# Patient Record
Sex: FEMALE | Race: ASIAN | NOT HISPANIC OR LATINO | URBAN - METROPOLITAN AREA
[De-identification: names, ages, dates, MRNs, and addresses within clinical notes are randomized per-mention and may not be internally consistent; named-entity substitution may affect disease eponyms.]

---

## 2023-02-12 ENCOUNTER — EMERGENCY (EMERGENCY)
Facility: HOSPITAL | Age: 38
LOS: 1 days | Discharge: HOME | End: 2023-02-12
Attending: EMERGENCY MEDICINE | Admitting: EMERGENCY MEDICINE
Payer: COMMERCIAL

## 2023-02-12 VITALS
SYSTOLIC BLOOD PRESSURE: 125 MMHG | HEART RATE: 91 BPM | DIASTOLIC BLOOD PRESSURE: 86 MMHG | RESPIRATION RATE: 15 BRPM | OXYGEN SATURATION: 100 % | TEMPERATURE: 98 F

## 2023-02-12 PROCEDURE — 99284 EMERGENCY DEPT VISIT MOD MDM: CPT

## 2023-02-12 NOTE — ED ADULT TRIAGE NOTE - CHIEF COMPLAINT QUOTE
presents C/O depression after argument with . non compliant with Abilify.  denies Suicidal or homicidal ideation. denies auditory or visual hallucination. denies drugs or alcohol. Pmhx depression

## 2023-02-12 NOTE — ED PROVIDER NOTE - PATIENT PORTAL LINK FT
You can access the FollowMyHealth Patient Portal offered by [facility] by registering at the following website: [email or web address]. By joining [employer]’s FollowMyHealth portal, you will also be able to view your health information using other applications (apps) compatible with our system.
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
## 2023-02-12 NOTE — ED ADULT NURSE NOTE - OBJECTIVE STATEMENT
Patient A&Ox4 ambulatory c/o depression requesting therapy. Patient states she has been under a lot of stress for the past year and has been unable to see therapist when in need. States hx of depression, noncompliant with medications. Respirations even and unlabored. Patient denies SI/HI/AH at this time. Also denies cp, sob, n/v/d, urinary symptoms, fevers and chills. Patient discharged by attending Darya.
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
## 2023-02-12 NOTE — ED PROVIDER NOTE - NSFOLLOWUPCLINICS_GEN_ALL_ED_FT
[facility] Psychiatry  Psychiatry  [address] [place] [address]  Phone: [phone]  Fax:   Scheduled Appointment: 2/12/2023

## 2023-02-12 NOTE — ED PROVIDER NOTE - PROGRESS NOTE DETAILS
patient denying si/hi. would like to follow up with crisis center in the am. information and return precautions given, patient now denying si/hi. would like to follow up with crisis center in the am. information and return precautions given,

## 2023-08-04 ENCOUNTER — EMERGENCY (EMERGENCY)
Facility: HOSPITAL | Age: 38
LOS: 1 days | Discharge: HOME | End: 2023-08-04
Payer: COMMERCIAL

## 2023-08-04 VITALS
SYSTOLIC BLOOD PRESSURE: 112 MMHG | HEART RATE: 99 BPM | RESPIRATION RATE: 16 BRPM | TEMPERATURE: 98 F | DIASTOLIC BLOOD PRESSURE: 79 MMHG | OXYGEN SATURATION: 100 %

## 2023-08-04 PROCEDURE — 99285 EMERGENCY DEPT VISIT HI MDM: CPT | Mod: 25

## 2023-08-04 PROCEDURE — 99212 OFFICE O/P EST SF 10 MIN: CPT

## 2023-08-04 NOTE — ED ADULT TRIAGE NOTE - TEMPERATURE IN FAHRENHEIT (DEGREES F)
Routing refill request to provider for review/approval because:        Blood pressure under 140/90 in past 12 months        BP Readings from Last 3 Encounters:   11/04/21 (!) 162/98   05/10/21 (!) 152/82   01/21/20 134/76     Isela Serrano RN, BSN   [facility] [place]      97.7

## 2023-08-05 DIAGNOSIS — F25.9 SCHIZOAFFECTIVE DISORDER, UNSPECIFIED: ICD-10-CM

## 2023-08-05 LAB
AMPHET UR-MCNC: NEGATIVE — SIGNIFICANT CHANGE UP
ANION GAP SERPL CALC-SCNC: 14 MMOL/L — SIGNIFICANT CHANGE UP (ref 5–17)
APAP SERPL-MCNC: <15 UG/ML — SIGNIFICANT CHANGE UP (ref 10–30)
APPEARANCE UR: CLEAR — SIGNIFICANT CHANGE UP
BARBITURATES UR SCN-MCNC: NEGATIVE — SIGNIFICANT CHANGE UP
BASOPHILS # BLD AUTO: 0.03 K/UL — SIGNIFICANT CHANGE UP (ref 0–0.2)
BASOPHILS NFR BLD AUTO: 0.4 % — SIGNIFICANT CHANGE UP (ref 0–2)
BENZODIAZ UR-MCNC: NEGATIVE — SIGNIFICANT CHANGE UP
BILIRUB UR-MCNC: NEGATIVE — SIGNIFICANT CHANGE UP
BUN SERPL-MCNC: <4 MG/DL — LOW (ref 7–23)
CALCIUM SERPL-MCNC: 9.7 MG/DL — SIGNIFICANT CHANGE UP (ref 8.4–10.5)
CHLORIDE SERPL-SCNC: 104 MMOL/L — SIGNIFICANT CHANGE UP (ref 96–108)
CO2 SERPL-SCNC: 21 MMOL/L — LOW (ref 22–31)
COCAINE METAB.OTHER UR-MCNC: NEGATIVE — SIGNIFICANT CHANGE UP
COLOR SPEC: YELLOW — SIGNIFICANT CHANGE UP
CREAT SERPL-MCNC: 0.66 MG/DL — SIGNIFICANT CHANGE UP (ref 0.5–1.3)
DIFF PNL FLD: NEGATIVE — SIGNIFICANT CHANGE UP
EGFR: 114 ML/MIN/1.73M2 — SIGNIFICANT CHANGE UP
EOSINOPHIL # BLD AUTO: 0.13 K/UL — SIGNIFICANT CHANGE UP (ref 0–0.5)
EOSINOPHIL NFR BLD AUTO: 1.8 % — SIGNIFICANT CHANGE UP (ref 0–6)
ETHANOL SERPL-MCNC: <10 MG/DL — SIGNIFICANT CHANGE UP (ref 0–10)
FLUAV AG NPH QL: SIGNIFICANT CHANGE UP
FLUBV AG NPH QL: SIGNIFICANT CHANGE UP
GLUCOSE SERPL-MCNC: 98 MG/DL — SIGNIFICANT CHANGE UP (ref 70–99)
GLUCOSE UR QL: NEGATIVE — SIGNIFICANT CHANGE UP
HCG SERPL-ACNC: <2 MIU/ML — SIGNIFICANT CHANGE UP
HCT VFR BLD CALC: 41.7 % — SIGNIFICANT CHANGE UP (ref 34.5–45)
HGB BLD-MCNC: 13.3 G/DL — SIGNIFICANT CHANGE UP (ref 11.5–15.5)
IMM GRANULOCYTES NFR BLD AUTO: 0.4 % — SIGNIFICANT CHANGE UP (ref 0–0.9)
KETONES UR-MCNC: NEGATIVE — SIGNIFICANT CHANGE UP
LEUKOCYTE ESTERASE UR-ACNC: NEGATIVE — SIGNIFICANT CHANGE UP
LYMPHOCYTES # BLD AUTO: 1.41 K/UL — SIGNIFICANT CHANGE UP (ref 1–3.3)
LYMPHOCYTES # BLD AUTO: 19.1 % — SIGNIFICANT CHANGE UP (ref 13–44)
MCHC RBC-ENTMCNC: 30.4 PG — SIGNIFICANT CHANGE UP (ref 27–34)
MCHC RBC-ENTMCNC: 31.9 GM/DL — LOW (ref 32–36)
MCV RBC AUTO: 95.4 FL — SIGNIFICANT CHANGE UP (ref 80–100)
METHADONE UR-MCNC: NEGATIVE — SIGNIFICANT CHANGE UP
MONOCYTES # BLD AUTO: 0.57 K/UL — SIGNIFICANT CHANGE UP (ref 0–0.9)
MONOCYTES NFR BLD AUTO: 7.7 % — SIGNIFICANT CHANGE UP (ref 2–14)
NEUTROPHILS # BLD AUTO: 5.21 K/UL — SIGNIFICANT CHANGE UP (ref 1.8–7.4)
NEUTROPHILS NFR BLD AUTO: 70.6 % — SIGNIFICANT CHANGE UP (ref 43–77)
NITRITE UR-MCNC: NEGATIVE — SIGNIFICANT CHANGE UP
NRBC # BLD: 0 /100 WBCS — SIGNIFICANT CHANGE UP (ref 0–0)
OPIATES UR-MCNC: NEGATIVE — SIGNIFICANT CHANGE UP
OXYCODONE UR-MCNC: NEGATIVE — SIGNIFICANT CHANGE UP
PCP SPEC-MCNC: SIGNIFICANT CHANGE UP
PCP UR-MCNC: NEGATIVE — SIGNIFICANT CHANGE UP
PH UR: 5.5 — SIGNIFICANT CHANGE UP (ref 5–8)
PLATELET # BLD AUTO: 251 K/UL — SIGNIFICANT CHANGE UP (ref 150–400)
POTASSIUM SERPL-MCNC: 3.6 MMOL/L — SIGNIFICANT CHANGE UP (ref 3.5–5.3)
POTASSIUM SERPL-SCNC: 3.6 MMOL/L — SIGNIFICANT CHANGE UP (ref 3.5–5.3)
PROT UR-MCNC: NEGATIVE — SIGNIFICANT CHANGE UP
RBC # BLD: 4.37 M/UL — SIGNIFICANT CHANGE UP (ref 3.8–5.2)
RBC # FLD: 13.1 % — SIGNIFICANT CHANGE UP (ref 10.3–14.5)
RSV RNA NPH QL NAA+NON-PROBE: SIGNIFICANT CHANGE UP
SALICYLATES SERPL-MCNC: <2 MG/DL — LOW (ref 15–30)
SARS-COV-2 RNA SPEC QL NAA+PROBE: SIGNIFICANT CHANGE UP
SODIUM SERPL-SCNC: 139 MMOL/L — SIGNIFICANT CHANGE UP (ref 135–145)
SP GR SPEC: 1.01 — LOW (ref 1.01–1.02)
THC UR QL: NEGATIVE — SIGNIFICANT CHANGE UP
TSH SERPL-MCNC: 0.82 UIU/ML — SIGNIFICANT CHANGE UP (ref 0.27–4.2)
UROBILINOGEN FLD QL: NEGATIVE — SIGNIFICANT CHANGE UP
WBC # BLD: 7.38 K/UL — SIGNIFICANT CHANGE UP (ref 3.8–10.5)
WBC # FLD AUTO: 7.38 K/UL — SIGNIFICANT CHANGE UP (ref 3.8–10.5)

## 2023-08-05 PROCEDURE — 90792 PSYCH DIAG EVAL W/MED SRVCS: CPT | Mod: 95

## 2023-08-05 RX ORDER — OLANZAPINE 15 MG/1
5 TABLET, FILM COATED ORAL ONCE
Refills: 0 | Status: COMPLETED | OUTPATIENT
Start: 2023-08-05 | End: 2023-08-05

## 2023-08-05 RX ORDER — OLANZAPINE 15 MG/1
5 TABLET, FILM COATED ORAL ONCE
Refills: 0 | Status: COMPLETED | OUTPATIENT
Start: 2023-08-05 | End: 2023-08-06

## 2023-08-05 NOTE — ED BEHAVIORAL HEALTH ASSESSMENT NOTE - VIOLENCE RISK FACTORS:
Feeling of being under threat and being unable to control threat/Lack of insight into violence risk/need for treatment/Noncompliance with treatment/Irritability

## 2023-08-05 NOTE — ED BEHAVIORAL HEALTH ASSESSMENT NOTE - PSYCHIATRIC ISSUES AND PLAN (INCLUDE STANDING AND PRN MEDICATION)
would offer zyprexa 5mg PO now and start 5mg qHS. for agitation, haldol 5mg with ativan 2mg and benadryl 50mg PO or IM if severe

## 2023-08-05 NOTE — ED BEHAVIORAL HEALTH ASSESSMENT NOTE - RISK ASSESSMENT
risk factors include medication nonadherence, psychosis, insomnia, aggression. protective factors include lack of prior attempts, lack of access to firearms, sobriety
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
## 2023-08-05 NOTE — ED PROVIDER NOTE - PHYSICAL EXAMINATION
General: WN/WD NAD  Head: Atraumatic  Eyes: EOM grossly in tact, no scleral icterus  ENT: moist mucous membranes  Neurology: A&Ox3, nonfocal, ROME x 4  Respiratory: normal respiratory effort  CV: Extremities warm and well perfused  Abdominal: Soft, non-distended  Extremities: No edema  Skin: No rashes  Psych: Guarded, pressured speech, aggressive but able to verbally redirect/deescalate
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
## 2023-08-05 NOTE — ED ADULT NURSE REASSESSMENT NOTE - NS ED NURSE REASSESS COMMENT FT1
Received patient from JEFF Recio at 1900. Full code, AOx4, continent x2, independent. pt to be admitted to psych facility - 2 pc'd. on 1:1 for risk of harm to self/others. No apparent signs of acute discomfort/distress at this time. Infection/fall/safety protocol maintained. POC reviewed, Pt informed of call bell system and placed within reach.
Received report from JEFF Mcghee . pt Breathing spontaneous and unlabored on, Skin warm and dry and of color appropriate for ethnicity , moves all extremities, speech clear. pending dispo. offered pt breakfast pt stated she was not hungry. no further nurse intervention needed at this time.

## 2023-08-05 NOTE — ED BEHAVIORAL HEALTH NOTE - BEHAVIORAL HEALTH NOTE
==================  PRE-HOSPITAL COURSE  ===================  SOURCE:  MD Sebastian and secondhand ED documentation  DETAILS: BIB EMS/PD for agitation   =========  ED COURSE  =========  SOURCE:  MD and secondhand ED documentation.  ARRIVAL:  Per chart and MD, patient arrived via EMS. Per MD, patient was calm upon arrival, and cooperative with triage process.  BELONGINGS:  Per chart, patient arrived with belongings. All belongings were provided to hospital security, and patient currently in a gown with a 1:1 staff member.  BEHAVIOR: MD described patient to be calm but guarded, poor historian, uncooperative with interview, with pressured and rapid speech presenting with linear thought process, AAOx3, presenting with irritable mood and guarded affect, remains in behavioral and impulse control, is not violent/aggressive. MD stated that the patient is not endorsing SI/HI/A/VH. MD stated that there are no visible marks, bruises, or lacerations on the body. MD stated that the patient appears to be well-groomed, maintains good hygiene.  TREATMENT:  Per chart and MD, patient did not receive PRN medications.   VISITORS:  Per MD, no visitors at bedside.          COVID Exposure Screen- collateral (i.e. third-party, chart review, belongings, etc; include EMS and ED staff)   ---------------------------------------------------  1. Has the patient had a COVID-19 test in the last 90 days? Unknown.   2. Has the patient tested positive for COVID-19 antibodies? Unknown.   3. Has the patient received 2 doses of the COVID-19 vaccine? Unknown  4. In the past 10 days, has the patient been around anyone with a positive COVID-19 test?* Unknown.   5. Has the patient been out of [place] within the past 10 days? Unknown

## 2023-08-05 NOTE — ED PROVIDER NOTE - OBJECTIVE STATEMENT
38yoF w/unknown psychiatric history presenting for psychiatric evaluation, noncompliant with psychiatric medications at home. Had verbal altercation with  at home which led to pt presenting to ED tonight. Pt refusing to provide more details. Denies alcohol tobacco or drugs tonight.

## 2023-08-06 VITALS
DIASTOLIC BLOOD PRESSURE: 62 MMHG | OXYGEN SATURATION: 100 % | HEART RATE: 51 BPM | SYSTOLIC BLOOD PRESSURE: 97 MMHG | TEMPERATURE: 99 F

## 2023-08-06 PROCEDURE — 80048 BASIC METABOLIC PNL TOTAL CA: CPT

## 2023-08-06 PROCEDURE — 99212 OFFICE O/P EST SF 10 MIN: CPT

## 2023-08-06 PROCEDURE — 80307 DRUG TEST PRSMV CHEM ANLYZR: CPT

## 2023-08-06 PROCEDURE — 93005 ELECTROCARDIOGRAM TRACING: CPT

## 2023-08-06 PROCEDURE — 87637 SARSCOV2&INF A&B&RSV AMP PRB: CPT

## 2023-08-06 PROCEDURE — 84443 ASSAY THYROID STIM HORMONE: CPT

## 2023-08-06 PROCEDURE — 84702 CHORIONIC GONADOTROPIN TEST: CPT

## 2023-08-06 PROCEDURE — 99285 EMERGENCY DEPT VISIT HI MDM: CPT | Mod: 25

## 2023-08-06 PROCEDURE — 81003 URINALYSIS AUTO W/O SCOPE: CPT

## 2023-08-06 PROCEDURE — 85025 COMPLETE CBC W/AUTO DIFF WBC: CPT

## 2023-08-06 PROCEDURE — 96372 THER/PROPH/DIAG INJ SC/IM: CPT

## 2023-08-06 RX ORDER — OLANZAPINE 15 MG/1
10 TABLET, FILM COATED ORAL ONCE
Refills: 0 | Status: COMPLETED | OUTPATIENT
Start: 2023-08-06 | End: 2023-08-06

## 2023-08-06 RX ADMIN — OLANZAPINE 10 MILLIGRAM(S): 15 TABLET, FILM COATED ORAL at 10:01

## 2023-08-06 NOTE — PROGRESS NOTE BEHAVIORAL HEALTH - ORIENTED TO PLACE
Yes
Yes
You can access the FollowMyHealth Patient Portal offered by [facility] by registering at the following website: [email or web address]. By joining [employer]’s FollowMyHealth portal, you will also be able to view your health information using other applications (apps) compatible with our system.

## 2023-08-06 NOTE — ED BEHAVIORAL HEALTH NOTE - BEHAVIORAL HEALTH NOTE
Per bedside RN patient has been sleeping in the ED, no acute changes in mood/behavior or other issues identified. Patient continues to hold for bed placement.

## 2023-08-06 NOTE — PROGRESS NOTE BEHAVIORAL HEALTH - NSBHCHARTREVIEWINVESTIGATE_PSY_A_CORE FT
< from: 12 Lead ECG (08.05.23 @ 01:00) >      Ventricular Rate 60 BPM    Atrial Rate 60 BPM    P-R Interval 156 ms    QRS Duration 78 ms    Q-T Interval 382 ms    QTC Calculation(Bazett) 382 ms      < end of copied text >
< from: 12 Lead ECG (08.05.23 @ 01:00) >      Ventricular Rate 60 BPM    Atrial Rate 60 BPM    P-R Interval 156 ms    QRS Duration 78 ms    Q-T Interval 382 ms    QTC Calculation(Bazett) 382 ms      < end of copied text >

## 2023-08-06 NOTE — PROGRESS NOTE BEHAVIORAL HEALTH - NSBHFUPINTERVALHXFT_PSY_A_CORE
Patient seen and evaluated, staff consulted, chart, labs, meds reviewed. Patient irritable, trying to explain her situation leading to this ED visit, evasive, tangential, at times non-sensical. She is intermittently agitated, but mostly responds to redirection. Marginally cooperative, prefers to be left alone.
Patient seen and evaluated, staff consulted, chart, labs, meds reviewed. Patient intermittently agitated, but mostly responds to redirection. Marginally cooperative, prefers to be left alone.

## 2023-08-06 NOTE — PROGRESS NOTE BEHAVIORAL HEALTH - NSBHCHARTREVIEWLAB_PSY_A_CORE FT
13.3   7.38  )-----------( 251      ( 05 Aug 2023 01:47 )             41.7     08-    139  |  104  |  <4<L>  ----------------------------<  98  3.6   |  21<L>  |  0.66    Ca    9.7      05 Aug 2023 01:47      Urinalysis Basic - ( 05 Aug 2023 02:40 )    Color: Yellow / Appearance: Clear / S.006 / pH: x  Gluc: x / Ketone: Negative  / Bili: Negative / Urobili: Negative   Blood: x / Protein: Negative / Nitrite: Negative   Leuk Esterase: Negative / RBC: x / WBC x   Sq Epi: x / Non Sq Epi: x / Bacteria: x
13.3   7.38  )-----------( 251      ( 05 Aug 2023 01:47 )             41.7     08-    139  |  104  |  <4<L>  ----------------------------<  98  3.6   |  21<L>  |  0.66    Ca    9.7      05 Aug 2023 01:47      Urinalysis Basic - ( 05 Aug 2023 02:40 )    Color: Yellow / Appearance: Clear / S.006 / pH: x  Gluc: x / Ketone: Negative  / Bili: Negative / Urobili: Negative   Blood: x / Protein: Negative / Nitrite: Negative   Leuk Esterase: Negative / RBC: x / WBC x   Sq Epi: x / Non Sq Epi: x / Bacteria: x
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
## 2023-08-06 NOTE — ED ADULT NURSE REASSESSMENT NOTE - DESCRIPTION
Pt refused to take Zyprexa 5 mg po as stat, said she is fine and the doctor is "crazy"
pt is asleep, reprt given to ARELIS Martinez

## 2023-08-06 NOTE — PROGRESS NOTE BEHAVIORAL HEALTH - NSBHATTESTBILLING_PSY_A_CORE
[id number]-Sjviadlyvgt diagnostic evaluation with medical services
[id number]-Gygcfgjrxis diagnostic evaluation with medical services

## 2023-08-06 NOTE — PROGRESS NOTE BEHAVIORAL HEALTH - NSBHCHARTREVIEWVS_PSY_A_CORE FT
Vital Signs Last 24 Hrs  T(C): 36.5 (06 Aug 2023 11:20), Max: 36.9 (06 Aug 2023 06:24)  T(F): 97.7 (06 Aug 2023 11:20), Max: 98.5 (06 Aug 2023 06:24)  HR: 49 (06 Aug 2023 11:20) (49 - 62)  BP: 96/59 (06 Aug 2023 11:20) (96/59 - 109/69)  BP(mean): 76 (05 Aug 2023 17:28) (76 - 76)  RR: 16 (06 Aug 2023 06:24) (16 - 18)  SpO2: 100% (06 Aug 2023 11:20) (99% - 100%)    Parameters below as of 06 Aug 2023 06:24  Patient On (Oxygen Delivery Method): room air      T(C): 36.5 (08-06-23 @ 11:20), Max: 36.9 (08-06-23 @ 06:24)  HR: 49 (08-06-23 @ 11:20) (49 - 62)  BP: 96/59 (08-06-23 @ 11:20) (96/59 - 109/69)  RR: 16 (08-06-23 @ 06:24) (16 - 18)  SpO2: 100% (08-06-23 @ 11:20) (99% - 100%)  Wt(kg): --
ICU Vital Signs Last 24 Hrs  T(C): 36.7 (05 Aug 2023 17:28), Max: 36.9 (05 Aug 2023 05:12)  T(F): 98 (05 Aug 2023 17:28), Max: 98.4 (05 Aug 2023 05:12)  HR: 56 (05 Aug 2023 17:28) (54 - 99)  BP: 99/64 (05 Aug 2023 17:28) (99/64 - 120/82)  BP(mean): 76 (05 Aug 2023 17:28) (76 - 76)  ABP: --  ABP(mean): --  RR: 18 (05 Aug 2023 17:28) (16 - 18)  SpO2: 99% (05 Aug 2023 17:28) (98% - 100%)    O2 Parameters below as of 05 Aug 2023 17:28  Patient On (Oxygen Delivery Method): room air

## 2023-10-06 ENCOUNTER — EMERGENCY (EMERGENCY)
Facility: HOSPITAL | Age: 38
LOS: 1 days | Discharge: PSYCHIATRIC OR SUBSTANCE USE FACILITY | End: 2023-10-06
Attending: EMERGENCY MEDICINE
Payer: MEDICARE

## 2023-10-06 VITALS
DIASTOLIC BLOOD PRESSURE: 72 MMHG | RESPIRATION RATE: 20 BRPM | TEMPERATURE: 98 F | SYSTOLIC BLOOD PRESSURE: 118 MMHG | OXYGEN SATURATION: 93 % | HEART RATE: 99 BPM

## 2023-10-06 DIAGNOSIS — F31.64 BIPOLAR DISORDER, CURRENT EPISODE MIXED, SEVERE, WITH PSYCHOTIC FEATURES: ICD-10-CM

## 2023-10-06 LAB
ALBUMIN SERPL ELPH-MCNC: 4.4 G/DL — SIGNIFICANT CHANGE UP (ref 3.3–5)
ALP SERPL-CCNC: 64 U/L — SIGNIFICANT CHANGE UP (ref 40–120)
ALT FLD-CCNC: 9 U/L — LOW (ref 10–45)
ANION GAP SERPL CALC-SCNC: 12 MMOL/L — SIGNIFICANT CHANGE UP (ref 5–17)
APAP SERPL-MCNC: <15 UG/ML — SIGNIFICANT CHANGE UP (ref 10–30)
APPEARANCE UR: CLEAR — SIGNIFICANT CHANGE UP
AST SERPL-CCNC: 16 U/L — SIGNIFICANT CHANGE UP (ref 10–40)
BACTERIA # UR AUTO: NEGATIVE — SIGNIFICANT CHANGE UP
BASOPHILS # BLD AUTO: 0.03 K/UL — SIGNIFICANT CHANGE UP (ref 0–0.2)
BASOPHILS NFR BLD AUTO: 0.3 % — SIGNIFICANT CHANGE UP (ref 0–2)
BILIRUB SERPL-MCNC: 0.5 MG/DL — SIGNIFICANT CHANGE UP (ref 0.2–1.2)
BILIRUB UR-MCNC: NEGATIVE — SIGNIFICANT CHANGE UP
BUN SERPL-MCNC: 9 MG/DL — SIGNIFICANT CHANGE UP (ref 7–23)
CALCIUM SERPL-MCNC: 9.3 MG/DL — SIGNIFICANT CHANGE UP (ref 8.4–10.5)
CHLORIDE SERPL-SCNC: 106 MMOL/L — SIGNIFICANT CHANGE UP (ref 96–108)
CO2 SERPL-SCNC: 23 MMOL/L — SIGNIFICANT CHANGE UP (ref 22–31)
COLOR SPEC: YELLOW — SIGNIFICANT CHANGE UP
CREAT SERPL-MCNC: 0.76 MG/DL — SIGNIFICANT CHANGE UP (ref 0.5–1.3)
DIFF PNL FLD: NEGATIVE — SIGNIFICANT CHANGE UP
EGFR: 102 ML/MIN/1.73M2 — SIGNIFICANT CHANGE UP
EOSINOPHIL # BLD AUTO: 0.08 K/UL — SIGNIFICANT CHANGE UP (ref 0–0.5)
EOSINOPHIL NFR BLD AUTO: 0.9 % — SIGNIFICANT CHANGE UP (ref 0–6)
EPI CELLS # UR: 6 /HPF — HIGH
ETHANOL SERPL-MCNC: <10 MG/DL — SIGNIFICANT CHANGE UP (ref 0–10)
FLUAV AG NPH QL: SIGNIFICANT CHANGE UP
FLUBV AG NPH QL: SIGNIFICANT CHANGE UP
GLUCOSE SERPL-MCNC: 86 MG/DL — SIGNIFICANT CHANGE UP (ref 70–99)
GLUCOSE UR QL: NEGATIVE — SIGNIFICANT CHANGE UP
HCG SERPL-ACNC: <2 MIU/ML — SIGNIFICANT CHANGE UP
HCT VFR BLD CALC: 38.3 % — SIGNIFICANT CHANGE UP (ref 34.5–45)
HGB BLD-MCNC: 12.5 G/DL — SIGNIFICANT CHANGE UP (ref 11.5–15.5)
HYALINE CASTS # UR AUTO: 0 /LPF — SIGNIFICANT CHANGE UP (ref 0–7)
IMM GRANULOCYTES NFR BLD AUTO: 0.4 % — SIGNIFICANT CHANGE UP (ref 0–0.9)
KETONES UR-MCNC: NEGATIVE — SIGNIFICANT CHANGE UP
LEUKOCYTE ESTERASE UR-ACNC: NEGATIVE — SIGNIFICANT CHANGE UP
LYMPHOCYTES # BLD AUTO: 0.77 K/UL — LOW (ref 1–3.3)
LYMPHOCYTES # BLD AUTO: 8.7 % — LOW (ref 13–44)
MCHC RBC-ENTMCNC: 30.9 PG — SIGNIFICANT CHANGE UP (ref 27–34)
MCHC RBC-ENTMCNC: 32.6 GM/DL — SIGNIFICANT CHANGE UP (ref 32–36)
MCV RBC AUTO: 94.8 FL — SIGNIFICANT CHANGE UP (ref 80–100)
MONOCYTES # BLD AUTO: 0.51 K/UL — SIGNIFICANT CHANGE UP (ref 0–0.9)
MONOCYTES NFR BLD AUTO: 5.7 % — SIGNIFICANT CHANGE UP (ref 2–14)
NEUTROPHILS # BLD AUTO: 7.46 K/UL — HIGH (ref 1.8–7.4)
NEUTROPHILS NFR BLD AUTO: 84 % — HIGH (ref 43–77)
NITRITE UR-MCNC: NEGATIVE — SIGNIFICANT CHANGE UP
NRBC # BLD: 0 /100 WBCS — SIGNIFICANT CHANGE UP (ref 0–0)
PH UR: 6 — SIGNIFICANT CHANGE UP (ref 5–8)
PLATELET # BLD AUTO: 234 K/UL — SIGNIFICANT CHANGE UP (ref 150–400)
POTASSIUM SERPL-MCNC: 3.3 MMOL/L — LOW (ref 3.5–5.3)
POTASSIUM SERPL-SCNC: 3.3 MMOL/L — LOW (ref 3.5–5.3)
PROT SERPL-MCNC: 6.9 G/DL — SIGNIFICANT CHANGE UP (ref 6–8.3)
PROT UR-MCNC: ABNORMAL
RBC # BLD: 4.04 M/UL — SIGNIFICANT CHANGE UP (ref 3.8–5.2)
RBC # FLD: 12.8 % — SIGNIFICANT CHANGE UP (ref 10.3–14.5)
RBC CASTS # UR COMP ASSIST: 3 /HPF — SIGNIFICANT CHANGE UP (ref 0–4)
RSV RNA NPH QL NAA+NON-PROBE: SIGNIFICANT CHANGE UP
SALICYLATES SERPL-MCNC: <2 MG/DL — LOW (ref 15–30)
SARS-COV-2 RNA SPEC QL NAA+PROBE: SIGNIFICANT CHANGE UP
SODIUM SERPL-SCNC: 141 MMOL/L — SIGNIFICANT CHANGE UP (ref 135–145)
SP GR SPEC: 1.03 — HIGH (ref 1.01–1.02)
UROBILINOGEN FLD QL: ABNORMAL
WBC # BLD: 8.89 K/UL — SIGNIFICANT CHANGE UP (ref 3.8–10.5)
WBC # FLD AUTO: 8.89 K/UL — SIGNIFICANT CHANGE UP (ref 3.8–10.5)
WBC UR QL: 7 /HPF — HIGH (ref 0–5)

## 2023-10-06 PROCEDURE — 85025 COMPLETE CBC W/AUTO DIFF WBC: CPT

## 2023-10-06 PROCEDURE — 99285 EMERGENCY DEPT VISIT HI MDM: CPT

## 2023-10-06 PROCEDURE — 93005 ELECTROCARDIOGRAM TRACING: CPT

## 2023-10-06 PROCEDURE — 80053 COMPREHEN METABOLIC PANEL: CPT

## 2023-10-06 PROCEDURE — 81001 URINALYSIS AUTO W/SCOPE: CPT

## 2023-10-06 PROCEDURE — 99285 EMERGENCY DEPT VISIT HI MDM: CPT | Mod: 25

## 2023-10-06 PROCEDURE — 87637 SARSCOV2&INF A&B&RSV AMP PRB: CPT

## 2023-10-06 PROCEDURE — 84702 CHORIONIC GONADOTROPIN TEST: CPT

## 2023-10-06 PROCEDURE — 99284 EMERGENCY DEPT VISIT MOD MDM: CPT | Mod: GC

## 2023-10-06 PROCEDURE — 80307 DRUG TEST PRSMV CHEM ANLYZR: CPT

## 2023-10-06 RX ORDER — POTASSIUM CHLORIDE 20 MEQ
40 PACKET (EA) ORAL ONCE
Refills: 0 | Status: COMPLETED | OUTPATIENT
Start: 2023-10-06 | End: 2023-10-06

## 2023-10-06 RX ADMIN — Medication 40 MILLIEQUIVALENT(S): at 20:03

## 2023-10-06 NOTE — ED BEHAVIORAL HEALTH ASSESSMENT NOTE - RISK ASSESSMENT
Risk factors: medication nonadherence, insomnia, agitation, aggression, hx of SI in the past, repeat instances of aggression, tangential thoughts.     Protective: Risk factors: medication nonadherence, insomnia, agitation, aggression, hx of SI in the past, repeat instances of aggression, tangential thoughts. Prior to admissions.     Protective: Risk factors: medication nonadherence, insomnia, agitation, aggression, hx of SI in the past, repeat instances of aggression, tangential thoughts. Prior to admissions.
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
## 2023-10-06 NOTE — ED BEHAVIORAL HEALTH ASSESSMENT NOTE - NSBHMSESPEECH_PSY_A_CORE
unable to assess- patient does not want to speak with us about her stay. Normal volume, rate, productivity, spontaneity and articulation

## 2023-10-06 NOTE — ED BEHAVIORAL HEALTH ASSESSMENT NOTE - OTHER PAST PSYCHIATRIC HISTORY (INCLUDE DETAILS REGARDING ONSET, COURSE OF ILLNESS, INPATIENT/OUTPATIENT TREATMENT)
Depression  Schizoaffective disorder   SI     Seen by Behavorial health in the August 2023  [facility] admission for psychiatric
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
## 2023-10-06 NOTE — ED BEHAVIORAL HEALTH ASSESSMENT NOTE - DESCRIPTION
none Placed in [facility] 29 with 1:1 observation. Prelim labs and urine toxicology had been collected. N/a  for 5 years
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
## 2023-10-06 NOTE — ED BEHAVIORAL HEALTH ASSESSMENT NOTE - NSACTIVEVENT_PSY_ALL_CORE
Triggering events leading to humiliation, shame, and/or despair (e.g., Loss of relationship, financial or health status) (real or anticipated)/Current or pending social isolation/Inadequate social supports/Recent onset of psychiatric illness

## 2023-10-06 NOTE — ED BEHAVIORAL HEALTH ASSESSMENT NOTE - NSPRESENTSXS_PSY_ALL_CORE
Depressed mood/Anhedonia/Psychosis/Impulsivity/Severe anxiety, agitation or panic/Refusal or inability to complete safety plan

## 2023-10-06 NOTE — ED BEHAVIORAL HEALTH ASSESSMENT NOTE - DETAILS
Patient had suicidal ideation in the past Patient had passive  suicidal ideation in Feb 2023. Patient had SI in 2008 and 2012. Plan communicated Bed pending
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
## 2023-10-07 ENCOUNTER — INPATIENT (INPATIENT)
Facility: HOSPITAL | Age: 38
LOS: 58 days | Discharge: HOME | End: 2023-12-05
Attending: STUDENT IN AN ORGANIZED HEALTH CARE EDUCATION/TRAINING PROGRAM | Admitting: STUDENT IN AN ORGANIZED HEALTH CARE EDUCATION/TRAINING PROGRAM
Payer: MEDICARE

## 2023-10-07 VITALS
RESPIRATION RATE: 18 BRPM | DIASTOLIC BLOOD PRESSURE: 71 MMHG | TEMPERATURE: 98 F | HEART RATE: 67 BPM | OXYGEN SATURATION: 100 % | SYSTOLIC BLOOD PRESSURE: 104 MMHG

## 2023-10-07 VITALS — OXYGEN SATURATION: 100 % | TEMPERATURE: 98 F | RESPIRATION RATE: 18 BRPM | HEIGHT: 66 IN

## 2023-10-07 DIAGNOSIS — F29 UNSPECIFIED PSYCHOSIS NOT DUE TO A SUBSTANCE OR KNOWN PHYSIOLOGICAL CONDITION: ICD-10-CM

## 2023-10-07 PROCEDURE — 90853 GROUP PSYCHOTHERAPY: CPT

## 2023-10-07 RX ORDER — OLANZAPINE 15 MG/1
5 TABLET, FILM COATED ORAL ONCE
Refills: 0 | Status: DISCONTINUED | OUTPATIENT
Start: 2023-10-07 | End: 2023-12-05

## 2023-10-07 RX ORDER — ACETAMINOPHEN 500 MG
650 TABLET ORAL EVERY 6 HOURS
Refills: 0 | Status: DISCONTINUED | OUTPATIENT
Start: 2023-10-07 | End: 2023-12-05

## 2023-10-07 RX ORDER — OLANZAPINE 15 MG/1
5 TABLET, FILM COATED ORAL EVERY 6 HOURS
Refills: 0 | Status: DISCONTINUED | OUTPATIENT
Start: 2023-10-07 | End: 2023-12-05

## 2023-10-07 NOTE — ED BEHAVIORAL HEALTH PROGRESS NOTE - RISK ASSESSMENT
Risk factors: medication nonadherence, insomnia, agitation, aggression, hx of SI in the past, repeat instances of aggression, tangential thoughts. Prior to admissions.

## 2023-10-07 NOTE — BH PATIENT PROFILE - HOME MEDICATIONS
Tony Grady,    Facilitator: Kelly Cabello, Certified Health     - No Rx/Hx Recorded

## 2023-10-07 NOTE — ED BEHAVIORAL HEALTH PROGRESS NOTE - DETAILS:
Patient hyperverbal and disorganized on exam. Notes her  is "eye genius" and goes on to state that his friend is a psychopath. Continues to derail as assessment progresses with limited insight into ED admission.

## 2023-10-07 NOTE — ED BEHAVIORAL HEALTH NOTE - BEHAVIORAL HEALTH NOTE
Per RN patient remains gowned, wanded, and in private room on 1:1 observation. No SI/HI/AH/VH elicited overnight; RN notes patient requesting to leave, one episode where patient burst into tears however stopped quickly after. Patient observed to be sleeping in hospital bed overnight and ambulating to the rest room on her own a few times. Patient has not required PRN medications in the ED and remains in behavioral control. Patient unaccompanied by social supports at this time. Patient pending psychiatric admission a this time.
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
## 2023-10-07 NOTE — ED BEHAVIORAL HEALTH PROGRESS NOTE - NS ED BHA PLAN HANDOFF TO INPATIENT PROVIDER YESNO
Health Maintenance Due   Topic Date Due   • Osteoporosis Screening  Never done   • COVID-19 Vaccine (2 - Booster for Tommy series) 06/07/2021   • Medicare Wellness Visit  01/19/2022       Patient is due for topics as listed above but is not proceeding with Immunization(s) COVID-19 at this time.     Recent PHQ 2/9 Score    PHQ 2:  Date Adult PHQ 2 Score Adult PHQ 2 Interpretation   8/19/2021 0 No further screening needed       PHQ 9:        No
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
## 2023-10-07 NOTE — ED ADULT NURSE REASSESSMENT NOTE - NS ED NURSE REASSESS COMMENT FT1
pt received at beginning of shift alert and oriented, accepted po potassium chloride and reinforced plan of care. Pt spontaneously started to cry and stopped by self as witnessed by staff. Denies any pain or discomfort. Appears to be resting in GRN 29 for most of the night, intermittently ambulating independently to bathroom accompanied by 1:1. 1:1 maintained for safety, no acute psych incidents overnight.
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
## 2023-10-08 PROCEDURE — 99221 1ST HOSP IP/OBS SF/LOW 40: CPT
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
## 2023-10-08 NOTE — BH INPATIENT PSYCHIATRY ASSESSMENT NOTE - NSBHATTESTAPPBILLTIME_PSY_A_CORE
I attest my time as SAMAN is greater than 50% of the total combined time spent on qualifying patient care activities. I have reviewed and verified the documentation.

## 2023-10-08 NOTE — BH INPATIENT PSYCHIATRY ASSESSMENT NOTE - OTHER PAST PSYCHIATRIC HISTORY (INCLUDE DETAILS REGARDING ONSET, COURSE OF ILLNESS, INPATIENT/OUTPATIENT TREATMENT)
Depression  Schizoaffective disorder   SI     Seen by Behavorial health in the August 2023  [facility] admission for psychiatric
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
## 2023-10-08 NOTE — BH INPATIENT PSYCHIATRY ASSESSMENT NOTE - NSBHCHARTREVIEWVS_PSY_A_CORE FT
Vital Signs Last 24 Hrs  T(C): 36.5 (10-08-23 @ 08:33), Max: 36.9 (10-07-23 @ 17:13)  T(F): 97.7 (10-08-23 @ 08:33), Max: 98.4 (10-07-23 @ 17:13)  HR: --  BP: --  BP(mean): --  RR: 18 (10-07-23 @ 17:13) (18 - 18)  SpO2: 100% (10-07-23 @ 17:13) (100% - 100%)    Orthostatic VS  10-08-23 @ 08:33  Lying BP: --/-- HR: --  Sitting BP: 119/67 HR: 69  Standing BP: 106/65 HR: 100  Site: --  Mode: --  Orthostatic VS  10-07-23 @ 17:13  Lying BP: --/-- HR: --  Sitting BP: 111/80 HR: 73  Standing BP: 112/90 HR: 102  Site: upper right arm  Mode: electronic
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
## 2023-10-08 NOTE — BH INPATIENT PSYCHIATRY ASSESSMENT NOTE - CURRENT MEDICATION
MEDICATIONS  (STANDING):    MEDICATIONS  (PRN):  acetaminophen     Tablet .. 650 milliGRAM(s) Oral every 6 hours PRN Temp greater or equal to 38C (100.4F), Mild Pain (1 - 3)  LORazepam     Tablet 2 milliGRAM(s) Oral every 6 hours PRN agitation  OLANZapine 5 milliGRAM(s) Oral every 6 hours PRN agitation  OLANZapine Injectable 5 milliGRAM(s) IntraMuscular once PRN severe agitation

## 2023-10-08 NOTE — BH INPATIENT PSYCHIATRY ASSESSMENT NOTE - NSBHATTESTBILLING_PSY_A_CORE
99221-Initial OBS or IP - low complexity OR 40-54 mins 99222-Initial OBS or IP - moderate complexity OR 55-74 mins
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
## 2023-10-08 NOTE — BH INPATIENT PSYCHIATRY ASSESSMENT NOTE - NSBHCRANIAL_PSY_ALL_CORE
Recognizes 2 fingers or can read (II)/Smiles, shows teeth, opens mouth, sticks out tongue (V, VII, XI)/Normal speech (IX, X, XII)/Hearing intact (VIII)

## 2023-10-09 PROCEDURE — 99232 SBSQ HOSP IP/OBS MODERATE 35: CPT

## 2023-10-09 RX ORDER — RISPERIDONE 4 MG/1
1 TABLET ORAL AT BEDTIME
Refills: 0 | Status: DISCONTINUED | OUTPATIENT
Start: 2023-10-09 | End: 2023-10-10
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
## 2023-10-09 NOTE — BH INPATIENT PSYCHIATRY PROGRESS NOTE - NSBHCHARTREVIEWVS_PSY_A_CORE FT
Vital Signs Last 24 Hrs  T(C): 36.3 (10-09-23 @ 07:43), Max: 36.8 (10-08-23 @ 19:15)  T(F): 97.4 (10-09-23 @ 07:43), Max: 98.2 (10-08-23 @ 19:15)  HR: --  BP: --  BP(mean): --  RR: --  SpO2: --    Orthostatic VS  10-09-23 @ 07:43  Lying BP: --/-- HR: --  Sitting BP: 110/81 HR: 60  Standing BP: 110/83 HR: 74  Site: --  Mode: --  Orthostatic VS  10-08-23 @ 19:15  Lying BP: --/-- HR: --  Sitting BP: 129/89 HR: 66  Standing BP: 111/71 HR: 96  Site: --  Mode: --  Orthostatic VS  10-08-23 @ 08:33  Lying BP: --/-- HR: --  Sitting BP: 119/67 HR: 69  Standing BP: 106/65 HR: 100  Site: --  Mode: --  Orthostatic VS  10-07-23 @ 17:13  Lying BP: --/-- HR: --  Sitting BP: 111/80 HR: 73  Standing BP: 112/90 HR: 102  Site: upper right arm  Mode: electronic
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
## 2023-10-09 NOTE — BH INPATIENT PSYCHIATRY PROGRESS NOTE - MSE UNSTRUCTURED FT
Appearance: grooming-intact ***  Behavior: cooperative, no PMA/PMR ***  Speech: regular rate, rhythm and volume ***  Mood:   Affect: euthymic ***  Thought process: linear, logical, organized ***  Thought content: no delusions elicited ***  Perceptions: no AH, VH elicited ***  Insight:  Judgement:  Cognition: grossly intact ***  Gait: intact Appearance: grooming-intact  Behavior: cooperative but guarded and suspicious, no PMA/PMR  Speech: hyperverbal and difficult to interrupt, normal volume  Mood: "I feel like crap"  Affect: irritable  Thought process: tangential, disorganized, difficult to follow  Thought content: highly paranoid/suspucious about family/coworkers, likely delusions  Perceptions: no AH, VH elicited, not internally pre-occupied appearing  Insight: extremely poor  Judgement: limited  Cognition: grossly intact   Gait: intact

## 2023-10-09 NOTE — BH INPATIENT PSYCHIATRY PROGRESS NOTE - CURRENT MEDICATION
MEDICATIONS  (STANDING):    MEDICATIONS  (PRN):  acetaminophen     Tablet .. 650 milliGRAM(s) Oral every 6 hours PRN Temp greater or equal to 38C (100.4F), Mild Pain (1 - 3)  LORazepam     Tablet 2 milliGRAM(s) Oral every 6 hours PRN agitation  OLANZapine 5 milliGRAM(s) Oral every 6 hours PRN agitation  OLANZapine Injectable 5 milliGRAM(s) IntraMuscular once PRN severe agitation   MEDICATIONS  (STANDING):  risperiDONE   Tablet 1 milliGRAM(s) Oral at bedtime    MEDICATIONS  (PRN):  acetaminophen     Tablet .. 650 milliGRAM(s) Oral every 6 hours PRN Temp greater or equal to 38C (100.4F), Mild Pain (1 - 3)  LORazepam     Tablet 2 milliGRAM(s) Oral every 6 hours PRN agitation  OLANZapine 5 milliGRAM(s) Oral every 6 hours PRN agitation  OLANZapine Injectable 5 milliGRAM(s) IntraMuscular once PRN severe agitation

## 2023-10-09 NOTE — BH INPATIENT PSYCHIATRY PROGRESS NOTE - PRN MEDS
MEDICATIONS  (PRN):  acetaminophen     Tablet .. 650 milliGRAM(s) Oral every 6 hours PRN Temp greater or equal to 38C (100.4F), Mild Pain (1 - 3)  LORazepam     Tablet 2 milliGRAM(s) Oral every 6 hours PRN agitation  OLANZapine 5 milliGRAM(s) Oral every 6 hours PRN agitation  OLANZapine Injectable 5 milliGRAM(s) IntraMuscular once PRN severe agitation

## 2023-10-10 PROCEDURE — 99232 SBSQ HOSP IP/OBS MODERATE 35: CPT

## 2023-10-10 RX ORDER — ARIPIPRAZOLE 15 MG/1
5 TABLET ORAL AT BEDTIME
Refills: 0 | Status: DISCONTINUED | OUTPATIENT
Start: 2023-10-10 | End: 2023-10-17
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
## 2023-10-10 NOTE — BH INPATIENT PSYCHIATRY PROGRESS NOTE - NSBHCHARTREVIEWVS_PSY_A_CORE FT
Vital Signs Last 24 Hrs  T(C): 36.6 (10-10-23 @ 08:43), Max: 36.8 (10-09-23 @ 19:12)  T(F): 97.9 (10-10-23 @ 08:43), Max: 98.2 (10-09-23 @ 19:12)  HR: --  BP: --  BP(mean): --  RR: --  SpO2: --    Orthostatic VS  10-10-23 @ 08:43  Lying BP: --/-- HR: --  Sitting BP: 97/70 HR: 71  Standing BP: 98/70 HR: 90  Site: --  Mode: --  Orthostatic VS  10-09-23 @ 19:12  Lying BP: --/-- HR: --  Sitting BP: 100/60 HR: 73  Standing BP: 100/70 HR: 83  Site: --  Mode: --  Orthostatic VS  10-09-23 @ 07:43  Lying BP: --/-- HR: --  Sitting BP: 110/81 HR: 60  Standing BP: 110/83 HR: 74  Site: --  Mode: --  Orthostatic VS  10-08-23 @ 19:15  Lying BP: --/-- HR: --  Sitting BP: 129/89 HR: 66  Standing BP: 111/71 HR: 96  Site: --  Mode: --

## 2023-10-10 NOTE — BH INPATIENT PSYCHIATRY PROGRESS NOTE - CURRENT MEDICATION
MEDICATIONS  (STANDING):  risperiDONE   Tablet 1 milliGRAM(s) Oral at bedtime    MEDICATIONS  (PRN):  acetaminophen     Tablet .. 650 milliGRAM(s) Oral every 6 hours PRN Temp greater or equal to 38C (100.4F), Mild Pain (1 - 3)  LORazepam     Tablet 2 milliGRAM(s) Oral every 6 hours PRN agitation  OLANZapine 5 milliGRAM(s) Oral every 6 hours PRN agitation  OLANZapine Injectable 5 milliGRAM(s) IntraMuscular once PRN severe agitation   MEDICATIONS  (STANDING):  ARIPiprazole 5 milliGRAM(s) Oral at bedtime    MEDICATIONS  (PRN):  acetaminophen     Tablet .. 650 milliGRAM(s) Oral every 6 hours PRN Temp greater or equal to 38C (100.4F), Mild Pain (1 - 3)  LORazepam     Tablet 2 milliGRAM(s) Oral every 6 hours PRN agitation  OLANZapine 5 milliGRAM(s) Oral every 6 hours PRN agitation  OLANZapine Injectable 5 milliGRAM(s) IntraMuscular once PRN severe agitation
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
## 2023-10-11 PROCEDURE — 90834 PSYTX W PT 45 MINUTES: CPT

## 2023-10-11 PROCEDURE — 99232 SBSQ HOSP IP/OBS MODERATE 35: CPT
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
## 2023-10-11 NOTE — BH INPATIENT PSYCHIATRY PROGRESS NOTE - NSBHCHARTREVIEWVS_PSY_A_CORE FT
Vital Signs Last 24 Hrs  T(C): 36.4 (10-11-23 @ 06:58), Max: 36.6 (10-10-23 @ 08:43)  T(F): 97.6 (10-11-23 @ 06:58), Max: 97.9 (10-10-23 @ 08:43)  HR: --  BP: --  BP(mean): --  RR: --  SpO2: --    Orthostatic VS  10-11-23 @ 06:58  Lying BP: --/-- HR: --  Sitting BP: 96/63 HR: 81  Standing BP: --/-- HR: --  Site: upper left arm  Mode: electronic  Orthostatic VS  10-10-23 @ 08:43  Lying BP: --/-- HR: --  Sitting BP: 97/70 HR: 71  Standing BP: 98/70 HR: 90  Site: --  Mode: --  Orthostatic VS  10-09-23 @ 19:12  Lying BP: --/-- HR: --  Sitting BP: 100/60 HR: 73  Standing BP: 100/70 HR: 83  Site: --  Mode: --   Vital Signs Last 24 Hrs  T(C): 36.4 (10-11-23 @ 06:58), Max: 36.4 (10-11-23 @ 06:58)  T(F): 97.6 (10-11-23 @ 06:58), Max: 97.6 (10-11-23 @ 06:58)  HR: --  BP: --  BP(mean): --  RR: --  SpO2: --    Orthostatic VS  10-11-23 @ 06:58  Lying BP: --/-- HR: --  Sitting BP: 96/63 HR: 81  Standing BP: --/-- HR: --  Site: upper left arm  Mode: electronic  Orthostatic VS  10-10-23 @ 08:43  Lying BP: --/-- HR: --  Sitting BP: 97/70 HR: 71  Standing BP: 98/70 HR: 90  Site: --  Mode: --  Orthostatic VS  10-09-23 @ 19:12  Lying BP: --/-- HR: --  Sitting BP: 100/60 HR: 73  Standing BP: 100/70 HR: 83  Site: --  Mode: --
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
## 2023-10-11 NOTE — BH INPATIENT PSYCHIATRY PROGRESS NOTE - NSBHFUPINTERVALHXFT_PSY_A_CORE
VSS, refused Abilify, no PRNs for agitation, yesterday was noted by staff to be very disorganized and paranoid, looking into trash cans, asking her medication name to be written on a piece of paper instead of spoken aloud. VSS, refused Abilify, no PRNs for agitation, yesterday was noted by staff to be very disorganized and paranoid, looking into trash cans, asking her medication name to be written on a piece of paper instead of spoken aloud.    Patient found in room, laying in bed awake. She states she does not want to talk to this writer. When this writer attempts to elicit more as to why she does not want to talk, patient shares that she feels this writer is fabricating stories, and is only keeping her here for selfish reasons (unable to elaborate on what those reasons may be). She also states that this writer has a mental illness. Asks this writer to leave immediately or she would report harassment.    Medication over objection hearing held today.

## 2023-10-11 NOTE — BH INPATIENT PSYCHIATRY PROGRESS NOTE - CURRENT MEDICATION
MEDICATIONS  (STANDING):  ARIPiprazole 5 milliGRAM(s) Oral at bedtime    MEDICATIONS  (PRN):  acetaminophen     Tablet .. 650 milliGRAM(s) Oral every 6 hours PRN Temp greater or equal to 38C (100.4F), Mild Pain (1 - 3)  LORazepam     Tablet 2 milliGRAM(s) Oral every 6 hours PRN agitation  OLANZapine 5 milliGRAM(s) Oral every 6 hours PRN agitation  OLANZapine Injectable 5 milliGRAM(s) IntraMuscular once PRN severe agitation

## 2023-10-12 PROCEDURE — 99231 SBSQ HOSP IP/OBS SF/LOW 25: CPT

## 2023-10-12 NOTE — BH INPATIENT PSYCHIATRY PROGRESS NOTE - NSBHCHARTREVIEWVS_PSY_A_CORE FT
Vital Signs Last 24 Hrs  T(C): 36.5 (10-12-23 @ 07:38), Max: 36.5 (10-12-23 @ 07:38)  T(F): 97.7 (10-12-23 @ 07:38), Max: 97.7 (10-12-23 @ 07:38)  HR: 60 (10-12-23 @ 07:38) (60 - 60)  BP: 104/59 (10-12-23 @ 07:38) (104/59 - 104/59)  BP(mean): --  RR: --  SpO2: --    Orthostatic VS  10-11-23 @ 06:58  Lying BP: --/-- HR: --  Sitting BP: 96/63 HR: 81  Standing BP: --/-- HR: --  Site: upper left arm  Mode: electronic  Orthostatic VS  10-10-23 @ 08:43  Lying BP: --/-- HR: --  Sitting BP: 97/70 HR: 71  Standing BP: 98/70 HR: 90  Site: --  Mode: --   Vital Signs Last 24 Hrs  T(C): 36.5 (10-12-23 @ 07:38), Max: 36.5 (10-12-23 @ 07:38)  T(F): 97.7 (10-12-23 @ 07:38), Max: 97.7 (10-12-23 @ 07:38)  HR: 60 (10-12-23 @ 07:38) (60 - 60)  BP: 104/59 (10-12-23 @ 07:38) (104/59 - 104/59)  BP(mean): --  RR: --  SpO2: --    Orthostatic VS  10-11-23 @ 06:58  Lying BP: --/-- HR: --  Sitting BP: 96/63 HR: 81  Standing BP: --/-- HR: --  Site: upper left arm  Mode: electronic
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
## 2023-10-12 NOTE — BH INPATIENT PSYCHIATRY PROGRESS NOTE - MSE UNSTRUCTURED FT
Appearance: grooming-intact, wearing hospital attire  Behavior: uncooperative, mistrustful, no PMA/PMR  Speech: hyperverbal and difficult to interrupt, normal volume  Mood: "I don't want to talk"  Affect: irritable  Thought process: too limited content to assess  Thought content: limited interview today but highly suspicious towards treating psychiatrist, states this psychiatrist has a mental illness  Perceptions: no AH, VH elicited, not internally pre-occupied appearing  Insight: extremely poor  Judgement: limited  Cognition: grossly intact   Gait: intact Appearance: grooming-intact, wearing hospital attire  Behavior: uncooperative, mistrustful, no PMA/PMR  Speech: unable to assess   Mood: "I don't want to talk"  Affect: irritable  Thought process: too limited content to assess  Thought content: limited interview but highly suspicious towards treating psychiatrist and other staff members  Perceptions: no AH, VH elicited, not internally pre-occupied appearing  Insight: extremely poor  Judgement: limited  Cognition: grossly intact   Gait: intact

## 2023-10-12 NOTE — BH INPATIENT PSYCHIATRY PROGRESS NOTE - NSBHFUPINTERVALHXFT_PSY_A_CORE
VSS, refusing medications, no PRNs for agitation. Overnight, per nursing, pulled nurse aside and warned to be careful about "white nurses" being after her VSS, refusing medications, no PRNs for agitation. Overnight, per nursing, pulled nurse aside and warned to be careful about "white nurses" being after her.    Patient seen in hallway. Approached patient and asked if she would be willing to speak with this writer. Patient states "I know kind of person you are" and that she doesn't want to talk, walks away from this writer.

## 2023-10-12 NOTE — BH INPATIENT PSYCHIATRY PROGRESS NOTE - NSBHATTESTBILLING_PSY_A_CORE
54094-Wfgfnfpuwd OBS or IP - moderate complexity OR 35-49 mins 45292-Ndqcnfhmzi OBS or IP - low complexity OR 25-34 mins

## 2023-10-13 PROCEDURE — 99231 SBSQ HOSP IP/OBS SF/LOW 25: CPT

## 2023-10-13 NOTE — BH INPATIENT PSYCHIATRY PROGRESS NOTE - NSBHFUPINTERVALHXFT_PSY_A_CORE
VSS, refusing medications, no PRNs for agitation, no acute events.  Per nursing, poor boundaries with peers. VSS, refusing medications, no PRNs for agitation, no acute events. Per nursing, poor boundaries with peers, and making various paranoid comments related to race.     Attempted to see patient in room in this morning. She did not look at this writer, and just repeated that she did not want to speak and asked this writer to leave. Asked patient about physical symptoms and patient declined to answer. Attempted to see patient later when seen in day room on unit, but upon seeing this writer the patient immediately changed direction.

## 2023-10-13 NOTE — BH INPATIENT PSYCHIATRY PROGRESS NOTE - NSBHCHARTREVIEWVS_PSY_A_CORE FT
Vital Signs Last 24 Hrs  T(C): 36.7 (10-13-23 @ 08:05), Max: 36.7 (10-13-23 @ 08:05)  T(F): 98.1 (10-13-23 @ 08:05), Max: 98.1 (10-13-23 @ 08:05)  HR: --  BP: --  BP(mean): --  RR: --  SpO2: --    Orthostatic VS  10-13-23 @ 08:05  Lying BP: --/-- HR: --  Sitting BP: 121/61 HR: 68  Standing BP: --/-- HR: --  Site: --  Mode: --
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
## 2023-10-13 NOTE — BH INPATIENT PSYCHIATRY PROGRESS NOTE - MSE UNSTRUCTURED FT
Appearance: grooming-intact, wearing hospital attire  Behavior: uncooperative, mistrustful, no PMA/PMR  Speech: unable to assess   Mood: "I don't want to talk"  Affect: irritable  Thought process: too limited content to assess  Thought content: limited interview but highly suspicious towards treating psychiatrist and other staff members  Perceptions: no AH, VH elicited, not internally pre-occupied appearing  Insight: extremely poor  Judgement: limited  Cognition: grossly intact   Gait: intact Appearance: grooming-intact, wearing hospital attire  Behavior: uncooperative, mistrustful, no PMA/PMR  Speech: unable to assess   Mood: unable to assess  Affect: irritable  Thought process: too limited content to assess  Thought content: limited interview but widespread suspicions towards treating psychiatrist, other staff members and peers, as well as previous reported convoluted suspicions regarding nearly all members of family  Perceptions: no AH, VH elicited, not internally pre-occupied appearing  Insight: extremely poor  Judgement: limited  Cognition: grossly intact   Gait: intact

## 2023-10-16 PROCEDURE — 90832 PSYTX W PT 30 MINUTES: CPT

## 2023-10-16 PROCEDURE — 99231 SBSQ HOSP IP/OBS SF/LOW 25: CPT

## 2023-10-16 RX ORDER — NICOTINE POLACRILEX 2 MG
2 GUM BUCCAL
Refills: 0 | Status: DISCONTINUED | OUTPATIENT
Start: 2023-10-16 | End: 2023-10-24

## 2023-10-16 RX ORDER — NICOTINE POLACRILEX 2 MG
1 GUM BUCCAL DAILY
Refills: 0 | Status: DISCONTINUED | OUTPATIENT
Start: 2023-10-16 | End: 2023-10-18

## 2023-10-16 RX ADMIN — Medication 1 PATCH: at 16:45

## 2023-10-16 RX ADMIN — Medication 1 PATCH: at 20:17
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
## 2023-10-16 NOTE — BH INPATIENT PSYCHIATRY PROGRESS NOTE - MSE UNSTRUCTURED FT
Appearance: grooming-intact, wearing hospital attire  Behavior: uncooperative, mistrustful, no PMA/PMR  Speech: unable to assess   Mood: unable to assess  Affect: irritable  Thought process: too limited content to assess  Thought content: limited interview but widespread suspicions towards treating psychiatrist, other staff members and peers, as well as previous reported convoluted suspicions regarding nearly all members of family  Perceptions: no AH, VH elicited, not internally pre-occupied appearing  Insight: extremely poor  Judgement: limited  Cognition: grossly intact   Gait: intact Appearance: grooming-intact, wearing hospital attire  Behavior: uncooperative, mistrustful, no PMA/PMR  Speech: hyperverbal and loud when irritated  Mood: angry  Affect: irritable, angry  Thought process: disorganized, illogical  Thought content: limited interview but significant paranoia towards treating psychiatrist and other staff members, as well as previously reported convoluted suspicions regarding nearly all members of family  Perceptions: no AH, VH elicited, not internally pre-occupied appearing  Insight: extremely poor  Judgement: poor  Cognition: grossly intact   Gait: intact
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
## 2023-10-16 NOTE — BH INPATIENT PSYCHIATRY PROGRESS NOTE - NSBHCHARTREVIEWVS_PSY_A_CORE FT
Vital Signs Last 24 Hrs  T(C): 36.6 (10-16-23 @ 07:45), Max: 36.6 (10-16-23 @ 07:45)  T(F): 97.9 (10-16-23 @ 07:45), Max: 97.9 (10-16-23 @ 07:45)  HR: --  BP: --  BP(mean): --  RR: --  SpO2: --    Orthostatic VS  10-16-23 @ 07:45  Lying BP: --/-- HR: --  Sitting BP: 99/67 HR: 66  Standing BP: --/-- HR: --  Site: --  Mode: --  Orthostatic VS  10-14-23 @ 08:47  Lying BP: --/-- HR: --  Sitting BP: 132/97 HR: 92  Standing BP: --/-- HR: --  Site: --  Mode: --   Vital Signs Last 24 Hrs  T(C): 36.6 (10-16-23 @ 07:45), Max: 36.6 (10-16-23 @ 07:45)  T(F): 97.9 (10-16-23 @ 07:45), Max: 97.9 (10-16-23 @ 07:45)  HR: --  BP: --  BP(mean): --  RR: --  SpO2: --    Orthostatic VS  10-16-23 @ 07:45  Lying BP: --/-- HR: --  Sitting BP: 99/67 HR: 66  Standing BP: --/-- HR: --  Site: --  Mode: --

## 2023-10-16 NOTE — BH INPATIENT PSYCHIATRY PROGRESS NOTE - NSBHFUPINTERVALHXFT_PSY_A_CORE
Refused vitals Sunday, refusing medications, no PRNs for agitation, asked staff about how to acquire an order of protection, refused to talk to various staff. Refused vitals Sunday, refusing medications, no PRNs for agitation, asked staff about how to acquire an order of protection, refused to talk to various staff.    Patient seen in dayroom with team this morning. She immediately leaves her chair after seeing treating psychiatrist approach. States that this writer has no integrity and is fabricating information. In discussion with SW and RN, describes psychiatrist as a "psychopath" and then later is seen telling other peers that this psychiatrist is not a real doctor. Later, when passing in the hallway patient is seen holding a middle finger towards the psychiatrist. When treatment team approach to tell patient this is not appropriate behavior, patient begins speaking loudly and cannot be interrupted, holds hand out towards team SW and refuses to engage further.

## 2023-10-17 PROCEDURE — 99232 SBSQ HOSP IP/OBS MODERATE 35: CPT

## 2023-10-17 RX ORDER — ARIPIPRAZOLE 15 MG/1
5 TABLET ORAL AT BEDTIME
Refills: 0 | Status: DISCONTINUED | OUTPATIENT
Start: 2023-10-17 | End: 2023-10-18

## 2023-10-17 RX ADMIN — Medication 1 PATCH: at 08:34

## 2023-10-17 NOTE — BH INPATIENT PSYCHIATRY PROGRESS NOTE - PRN MEDS
MEDICATIONS  (PRN):  acetaminophen     Tablet .. 650 milliGRAM(s) Oral every 6 hours PRN Temp greater or equal to 38C (100.4F), Mild Pain (1 - 3)  LORazepam     Tablet 2 milliGRAM(s) Oral every 6 hours PRN agitation  nicotine  Polacrilex Gum 2 milliGRAM(s) Oral every 2 hours PRN nicotine cravings  OLANZapine 5 milliGRAM(s) Oral every 6 hours PRN agitation  OLANZapine Injectable 5 milliGRAM(s) IntraMuscular once PRN severe agitation

## 2023-10-17 NOTE — BH INPATIENT PSYCHIATRY PROGRESS NOTE - NSBHCHARTREVIEWVS_PSY_A_CORE FT
Vital Signs Last 24 Hrs  T(C): --  T(F): --  HR: --  BP: --  BP(mean): --  RR: --  SpO2: --    Orthostatic VS  10-16-23 @ 07:45  Lying BP: --/-- HR: --  Sitting BP: 99/67 HR: 66  Standing BP: --/-- HR: --  Site: --  Mode: --
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
## 2023-10-17 NOTE — BH INPATIENT PSYCHIATRY PROGRESS NOTE - NSBHFUPINTERVALHXFT_PSY_A_CORE
Patient had colonoscopy last Friday and he has not had a bowel movement since then. He has been taking a fiber pill but that isn't helping so he is going to get some Miralax to see if that helps. Is there anything else he should do? VSS, refusing Abilify, no PRNs for agitation, accused environmental worker of throwing out papers in room VSS, refusing Abilify, no PRNs for agitation, accused environmental worker of throwing out papers in room    Patient seen in hallway with nursing present. She states she does not want to talk to someone "so inhumane" as this writer.

## 2023-10-17 NOTE — BH INPATIENT PSYCHIATRY PROGRESS NOTE - MSE UNSTRUCTURED FT
Appearance: grooming-intact, wearing hospital attire  Behavior: uncooperative, mistrustful, no PMA/PMR  Speech: hyperverbal and loud when irritated  Mood: angry  Affect: irritable, angry  Thought process: disorganized, illogical  Thought content: limited interview but significant paranoia towards treating psychiatrist and other staff members, as well as previously reported convoluted suspicions regarding nearly all members of family  Perceptions: no AH, VH elicited, not internally pre-occupied appearing  Insight: extremely poor  Judgement: poor  Cognition: grossly intact   Gait: intact Appearance: grooming-intact, wearing hospital attire  Behavior: uncooperative, mistrustful, no PMA/PMR  Speech: hyperverbal and loud when irritated  Mood: unable to assess  Affect: irritable, anxious  Thought process: too little content to assess, though illogical  Thought content: limited interview but significant paranoia towards treating psychiatrist and other staff members, as well as previously reported convoluted suspicions regarding nearly all members of family  Perceptions: no AH, VH elicited, not internally pre-occupied appearing  Insight: extremely poor  Judgement: poor  Cognition: grossly intact   Gait: intact

## 2023-10-17 NOTE — BH INPATIENT PSYCHIATRY PROGRESS NOTE - CURRENT MEDICATION
MEDICATIONS  (STANDING):  ARIPiprazole 5 milliGRAM(s) Oral at bedtime  nicotine - 21 mG/24Hr(s) Patch 1 Patch Transdermal daily    MEDICATIONS  (PRN):  acetaminophen     Tablet .. 650 milliGRAM(s) Oral every 6 hours PRN Temp greater or equal to 38C (100.4F), Mild Pain (1 - 3)  LORazepam     Tablet 2 milliGRAM(s) Oral every 6 hours PRN agitation  nicotine  Polacrilex Gum 2 milliGRAM(s) Oral every 2 hours PRN nicotine cravings  OLANZapine 5 milliGRAM(s) Oral every 6 hours PRN agitation  OLANZapine Injectable 5 milliGRAM(s) IntraMuscular once PRN severe agitation

## 2023-10-17 NOTE — BH INPATIENT PSYCHIATRY PROGRESS NOTE - NSBHATTESTBILLING_PSY_A_CORE
38038-Obpqrkqbdf OBS or IP - low complexity OR 25-34 mins 24991-Uolccqnuzi OBS or IP - moderate complexity OR 35-49 mins

## 2023-10-18 LAB
A1C WITH ESTIMATED AVERAGE GLUCOSE RESULT: 4.9 % — SIGNIFICANT CHANGE UP (ref 4–5.6)
A1C WITH ESTIMATED AVERAGE GLUCOSE RESULT: 4.9 % — SIGNIFICANT CHANGE UP (ref 4–5.6)
ALBUMIN SERPL ELPH-MCNC: 4 G/DL — SIGNIFICANT CHANGE UP (ref 3.3–5)
ALBUMIN SERPL ELPH-MCNC: 4 G/DL — SIGNIFICANT CHANGE UP (ref 3.3–5)
ALP SERPL-CCNC: 65 U/L — SIGNIFICANT CHANGE UP (ref 40–120)
ALP SERPL-CCNC: 65 U/L — SIGNIFICANT CHANGE UP (ref 40–120)
ALT FLD-CCNC: 20 U/L — SIGNIFICANT CHANGE UP (ref 4–33)
ALT FLD-CCNC: 20 U/L — SIGNIFICANT CHANGE UP (ref 4–33)
ANION GAP SERPL CALC-SCNC: 9 MMOL/L — SIGNIFICANT CHANGE UP (ref 7–14)
ANION GAP SERPL CALC-SCNC: 9 MMOL/L — SIGNIFICANT CHANGE UP (ref 7–14)
AST SERPL-CCNC: 22 U/L — SIGNIFICANT CHANGE UP (ref 4–32)
AST SERPL-CCNC: 22 U/L — SIGNIFICANT CHANGE UP (ref 4–32)
BILIRUB SERPL-MCNC: 0.3 MG/DL — SIGNIFICANT CHANGE UP (ref 0.2–1.2)
BILIRUB SERPL-MCNC: 0.3 MG/DL — SIGNIFICANT CHANGE UP (ref 0.2–1.2)
BUN SERPL-MCNC: 14 MG/DL — SIGNIFICANT CHANGE UP (ref 7–23)
BUN SERPL-MCNC: 14 MG/DL — SIGNIFICANT CHANGE UP (ref 7–23)
CALCIUM SERPL-MCNC: 8.9 MG/DL — SIGNIFICANT CHANGE UP (ref 8.4–10.5)
CALCIUM SERPL-MCNC: 8.9 MG/DL — SIGNIFICANT CHANGE UP (ref 8.4–10.5)
CHLORIDE SERPL-SCNC: 105 MMOL/L — SIGNIFICANT CHANGE UP (ref 98–107)
CHLORIDE SERPL-SCNC: 105 MMOL/L — SIGNIFICANT CHANGE UP (ref 98–107)
CHOLEST SERPL-MCNC: 123 MG/DL — SIGNIFICANT CHANGE UP
CHOLEST SERPL-MCNC: 123 MG/DL — SIGNIFICANT CHANGE UP
CO2 SERPL-SCNC: 26 MMOL/L — SIGNIFICANT CHANGE UP (ref 22–31)
CO2 SERPL-SCNC: 26 MMOL/L — SIGNIFICANT CHANGE UP (ref 22–31)
CREAT SERPL-MCNC: 0.63 MG/DL — SIGNIFICANT CHANGE UP (ref 0.5–1.3)
CREAT SERPL-MCNC: 0.63 MG/DL — SIGNIFICANT CHANGE UP (ref 0.5–1.3)
EGFR: 116 ML/MIN/1.73M2 — SIGNIFICANT CHANGE UP
EGFR: 116 ML/MIN/1.73M2 — SIGNIFICANT CHANGE UP
ESTIMATED AVERAGE GLUCOSE: 94 — SIGNIFICANT CHANGE UP
ESTIMATED AVERAGE GLUCOSE: 94 — SIGNIFICANT CHANGE UP
GLUCOSE SERPL-MCNC: 100 MG/DL — HIGH (ref 70–99)
GLUCOSE SERPL-MCNC: 100 MG/DL — HIGH (ref 70–99)
HDLC SERPL-MCNC: 49 MG/DL — LOW
HDLC SERPL-MCNC: 49 MG/DL — LOW
LIPID PNL WITH DIRECT LDL SERPL: 43 MG/DL — SIGNIFICANT CHANGE UP
LIPID PNL WITH DIRECT LDL SERPL: 43 MG/DL — SIGNIFICANT CHANGE UP
NON HDL CHOLESTEROL: 74 MG/DL — SIGNIFICANT CHANGE UP
NON HDL CHOLESTEROL: 74 MG/DL — SIGNIFICANT CHANGE UP
POTASSIUM SERPL-MCNC: 4.4 MMOL/L — SIGNIFICANT CHANGE UP (ref 3.5–5.3)
POTASSIUM SERPL-MCNC: 4.4 MMOL/L — SIGNIFICANT CHANGE UP (ref 3.5–5.3)
POTASSIUM SERPL-SCNC: 4.4 MMOL/L — SIGNIFICANT CHANGE UP (ref 3.5–5.3)
POTASSIUM SERPL-SCNC: 4.4 MMOL/L — SIGNIFICANT CHANGE UP (ref 3.5–5.3)
PROT SERPL-MCNC: 6.4 G/DL — SIGNIFICANT CHANGE UP (ref 6–8.3)
PROT SERPL-MCNC: 6.4 G/DL — SIGNIFICANT CHANGE UP (ref 6–8.3)
SODIUM SERPL-SCNC: 140 MMOL/L — SIGNIFICANT CHANGE UP (ref 135–145)
SODIUM SERPL-SCNC: 140 MMOL/L — SIGNIFICANT CHANGE UP (ref 135–145)
TRIGL SERPL-MCNC: 153 MG/DL — HIGH
TRIGL SERPL-MCNC: 153 MG/DL — HIGH

## 2023-10-18 PROCEDURE — 99232 SBSQ HOSP IP/OBS MODERATE 35: CPT

## 2023-10-18 RX ORDER — ARIPIPRAZOLE 15 MG/1
5 TABLET ORAL ONCE
Refills: 0 | Status: DISCONTINUED | OUTPATIENT
Start: 2023-10-18 | End: 2023-10-19

## 2023-10-18 RX ORDER — OLANZAPINE 15 MG/1
5 TABLET, FILM COATED ORAL DAILY
Refills: 0 | Status: DISCONTINUED | OUTPATIENT
Start: 2023-10-18 | End: 2023-12-05

## 2023-10-18 RX ORDER — NICOTINE POLACRILEX 2 MG
1 GUM BUCCAL DAILY
Refills: 0 | Status: DISCONTINUED | OUTPATIENT
Start: 2023-10-18 | End: 2023-12-05

## 2023-10-18 RX ORDER — NICOTINE POLACRILEX 2 MG
1 GUM BUCCAL DAILY
Refills: 0 | Status: DISCONTINUED | OUTPATIENT
Start: 2023-10-18 | End: 2023-10-18

## 2023-10-18 RX ORDER — ARIPIPRAZOLE 15 MG/1
5 TABLET ORAL DAILY
Refills: 0 | Status: DISCONTINUED | OUTPATIENT
Start: 2023-10-18 | End: 2023-10-18

## 2023-10-18 RX ORDER — ARIPIPRAZOLE 15 MG/1
5 TABLET ORAL DAILY
Refills: 0 | Status: DISCONTINUED | OUTPATIENT
Start: 2023-10-18 | End: 2023-10-19

## 2023-10-18 RX ADMIN — Medication 1 PATCH: at 14:44

## 2023-10-18 RX ADMIN — ARIPIPRAZOLE 5 MILLIGRAM(S): 15 TABLET ORAL at 11:44

## 2023-10-18 RX ADMIN — Medication 1 PATCH: at 11:28

## 2023-10-18 NOTE — BH INPATIENT PSYCHIATRY PROGRESS NOTE - CURRENT MEDICATION
MEDICATIONS  (STANDING):  ARIPiprazole 5 milliGRAM(s) Oral at bedtime  nicotine - 21 mG/24Hr(s) Patch 1 Patch Transdermal daily    MEDICATIONS  (PRN):  acetaminophen     Tablet .. 650 milliGRAM(s) Oral every 6 hours PRN Temp greater or equal to 38C (100.4F), Mild Pain (1 - 3)  LORazepam     Tablet 2 milliGRAM(s) Oral every 6 hours PRN agitation  nicotine  Polacrilex Gum 2 milliGRAM(s) Oral every 2 hours PRN nicotine cravings  OLANZapine 5 milliGRAM(s) Oral every 6 hours PRN agitation  OLANZapine Injectable 5 milliGRAM(s) IntraMuscular once PRN severe agitation   MEDICATIONS  (STANDING):  ARIPiprazole 5 milliGRAM(s) Oral once  ARIPiprazole  Solution 5 milliGRAM(s) Oral daily    MEDICATIONS  (PRN):  acetaminophen     Tablet .. 650 milliGRAM(s) Oral every 6 hours PRN Temp greater or equal to 38C (100.4F), Mild Pain (1 - 3)  LORazepam     Tablet 2 milliGRAM(s) Oral every 6 hours PRN agitation  nicotine  Polacrilex Gum 2 milliGRAM(s) Oral every 2 hours PRN nicotine cravings  nicotine -  14 mG/24Hr(s) Patch 1 Patch Transdermal daily PRN Nicotine w/d  OLANZapine 5 milliGRAM(s) Oral every 6 hours PRN agitation  OLANZapine Injectable 5 milliGRAM(s) IntraMuscular daily PRN Refusing court ordered PO medication  OLANZapine Injectable 5 milliGRAM(s) IntraMuscular once PRN severe agitation   MEDICATIONS  (STANDING):  ARIPiprazole 5 milliGRAM(s) Oral once  ARIPiprazole  Solution 5 milliGRAM(s) Oral daily    MEDICATIONS  (PRN):  acetaminophen     Tablet .. 650 milliGRAM(s) Oral every 6 hours PRN Temp greater or equal to 38C (100.4F), Mild Pain (1 - 3)  LORazepam     Tablet 2 milliGRAM(s) Oral every 6 hours PRN agitation  nicotine  Polacrilex Gum 2 milliGRAM(s) Oral every 2 hours PRN nicotine cravings  nicotine -  14 mG/24Hr(s) Patch 1 Patch Transdermal daily PRN Nicotine w/d  OLANZapine 5 milliGRAM(s) Oral every 6 hours PRN agitation  OLANZapine Injectable 5 milliGRAM(s) IntraMuscular once PRN severe agitation  OLANZapine Injectable 5 milliGRAM(s) IntraMuscular daily PRN Refusing court ordered PO medication

## 2023-10-18 NOTE — BH INPATIENT PSYCHIATRY PROGRESS NOTE - PRN MEDS
MEDICATIONS  (PRN):  acetaminophen     Tablet .. 650 milliGRAM(s) Oral every 6 hours PRN Temp greater or equal to 38C (100.4F), Mild Pain (1 - 3)  LORazepam     Tablet 2 milliGRAM(s) Oral every 6 hours PRN agitation  nicotine  Polacrilex Gum 2 milliGRAM(s) Oral every 2 hours PRN nicotine cravings  OLANZapine 5 milliGRAM(s) Oral every 6 hours PRN agitation  OLANZapine Injectable 5 milliGRAM(s) IntraMuscular once PRN severe agitation   MEDICATIONS  (PRN):  acetaminophen     Tablet .. 650 milliGRAM(s) Oral every 6 hours PRN Temp greater or equal to 38C (100.4F), Mild Pain (1 - 3)  LORazepam     Tablet 2 milliGRAM(s) Oral every 6 hours PRN agitation  nicotine  Polacrilex Gum 2 milliGRAM(s) Oral every 2 hours PRN nicotine cravings  nicotine -  14 mG/24Hr(s) Patch 1 Patch Transdermal daily PRN Nicotine w/d  OLANZapine 5 milliGRAM(s) Oral every 6 hours PRN agitation  OLANZapine Injectable 5 milliGRAM(s) IntraMuscular daily PRN Refusing court ordered PO medication  OLANZapine Injectable 5 milliGRAM(s) IntraMuscular once PRN severe agitation   MEDICATIONS  (PRN):  acetaminophen     Tablet .. 650 milliGRAM(s) Oral every 6 hours PRN Temp greater or equal to 38C (100.4F), Mild Pain (1 - 3)  LORazepam     Tablet 2 milliGRAM(s) Oral every 6 hours PRN agitation  nicotine  Polacrilex Gum 2 milliGRAM(s) Oral every 2 hours PRN nicotine cravings  nicotine -  14 mG/24Hr(s) Patch 1 Patch Transdermal daily PRN Nicotine w/d  OLANZapine 5 milliGRAM(s) Oral every 6 hours PRN agitation  OLANZapine Injectable 5 milliGRAM(s) IntraMuscular once PRN severe agitation  OLANZapine Injectable 5 milliGRAM(s) IntraMuscular daily PRN Refusing court ordered PO medication
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
## 2023-10-18 NOTE — BH INPATIENT PSYCHIATRY PROGRESS NOTE - NSBHMETABOLIC_PSY_ALL_CORE_FT
BMI:   HbA1c:   Glucose:   BP: --  Lipid Panel:  BMI:   HbA1c: A1C with Estimated Average Glucose Result: 4.9 % (10-18-23 @ 11:45)    Glucose:   BP: --  Lipid Panel:

## 2023-10-18 NOTE — BH INPATIENT PSYCHIATRY PROGRESS NOTE - NSBHFUPINTERVALHXFT_PSY_A_CORE
VSS, refusing Abilify, no PRNs for agitation, no acute events.  VSS, refusing Abilify, no PRNs for agitation, no acute events.     Patient seen in [facility]. States she does not want to talk to this writer. Continues to state that this writer has fabricated information and is a psychopath. Denies that this writer is her treating psychiatrist. Unwilling to discuss medications or lab draw further.    Later, was offered PO Abilify versus IM Zyprexa per court order. Took PO Abilify with encouragement. Also completed labs with encouragement.

## 2023-10-18 NOTE — BH INPATIENT PSYCHIATRY PROGRESS NOTE - MSE UNSTRUCTURED FT
Appearance: grooming-intact, wearing hospital attire  Behavior: uncooperative, mistrustful, no PMA/PMR  Speech: hyperverbal and loud when irritated  Mood: unable to assess  Affect: irritable, anxious  Thought process: too little content to assess, though illogical  Thought content: limited interview but significant paranoia towards treating psychiatrist and other staff members, as well as previously reported convoluted suspicions regarding nearly all members of family  Perceptions: no AH, VH elicited, not internally pre-occupied appearing  Insight: extremely poor  Judgement: poor  Cognition: grossly intact   Gait: intact Appearance: grooming-intact, wearing hospital attire, pacing hallway  Behavior: uncooperative, mistrustful, no PMA/PMR  Speech: normal rate and volume  Mood: unable to assess  Affect: irritable  Thought process: illogical, too limited content to assess organization  Thought content: limited interview but significant paranoia towards treating psychiatrist and other staff members, as well as previously reported convoluted suspicions regarding nearly all members of family  Perceptions: no AH, VH elicited, not internally pre-occupied appearing  Insight: extremely poor  Judgement: poor  Cognition: grossly intact   Gait: intact

## 2023-10-18 NOTE — BH INPATIENT PSYCHIATRY PROGRESS NOTE - NSBHCHARTREVIEWVS_PSY_A_CORE FT
Vital Signs Last 24 Hrs  T(C): 36.4 (10-18-23 @ 08:15), Max: 36.4 (10-18-23 @ 08:15)  T(F): 97.6 (10-18-23 @ 08:15), Max: 97.6 (10-18-23 @ 08:15)  HR: --  BP: --  BP(mean): --  RR: --  SpO2: --    Orthostatic VS  10-18-23 @ 08:15  Lying BP: --/-- HR: --  Sitting BP: 99/64 HR: 68  Standing BP: 110/67 HR: 83  Site: --  Mode: --

## 2023-10-19 PROCEDURE — 99232 SBSQ HOSP IP/OBS MODERATE 35: CPT

## 2023-10-19 RX ORDER — ARIPIPRAZOLE 15 MG/1
10 TABLET ORAL DAILY
Refills: 0 | Status: DISCONTINUED | OUTPATIENT
Start: 2023-10-19 | End: 2023-10-23

## 2023-10-19 RX ORDER — ARIPIPRAZOLE 15 MG/1
5 TABLET ORAL DAILY
Refills: 0 | Status: DISCONTINUED | OUTPATIENT
Start: 2023-10-19 | End: 2023-10-19

## 2023-10-19 RX ORDER — ARIPIPRAZOLE 15 MG/1
5 TABLET ORAL ONCE
Refills: 0 | Status: COMPLETED | OUTPATIENT
Start: 2023-10-19 | End: 2023-10-19

## 2023-10-19 RX ADMIN — ARIPIPRAZOLE 5 MILLIGRAM(S): 15 TABLET ORAL at 11:07

## 2023-10-19 RX ADMIN — Medication 1 PATCH: at 19:45

## 2023-10-19 RX ADMIN — Medication 1 PATCH: at 09:57

## 2023-10-19 NOTE — BH INPATIENT PSYCHIATRY PROGRESS NOTE - CURRENT MEDICATION
MEDICATIONS  (STANDING):  ARIPiprazole 10 milliGRAM(s) Oral daily  nicotine -  14 mG/24Hr(s) Patch 1 Patch Transdermal daily    MEDICATIONS  (PRN):  acetaminophen     Tablet .. 650 milliGRAM(s) Oral every 6 hours PRN Temp greater or equal to 38C (100.4F), Mild Pain (1 - 3)  LORazepam     Tablet 2 milliGRAM(s) Oral every 6 hours PRN agitation  nicotine  Polacrilex Gum 2 milliGRAM(s) Oral every 2 hours PRN nicotine cravings  OLANZapine 5 milliGRAM(s) Oral every 6 hours PRN agitation  OLANZapine Injectable 5 milliGRAM(s) IntraMuscular once PRN severe agitation  OLANZapine Injectable 5 milliGRAM(s) IntraMuscular daily PRN Refusing court ordered PO medication

## 2023-10-19 NOTE — BH INPATIENT PSYCHIATRY PROGRESS NOTE - NSBHFUPINTERVALHXFT_PSY_A_CORE
VSS, accepting PO (with encouragement), no PRNs for agitation, no acute events.    Patient seen in hallway. No eye contact made with provider, but less hostile. Does states she doesn't want to talk. Denies side effects/physical symptoms. She states she'll take her Abilify PO if changed to tablet and if her name in the system can be corrected. No derogatory comments made to this writer.

## 2023-10-19 NOTE — BH INPATIENT PSYCHIATRY PROGRESS NOTE - PRN MEDS
MEDICATIONS  (PRN):  acetaminophen     Tablet .. 650 milliGRAM(s) Oral every 6 hours PRN Temp greater or equal to 38C (100.4F), Mild Pain (1 - 3)  LORazepam     Tablet 2 milliGRAM(s) Oral every 6 hours PRN agitation  nicotine  Polacrilex Gum 2 milliGRAM(s) Oral every 2 hours PRN nicotine cravings  OLANZapine 5 milliGRAM(s) Oral every 6 hours PRN agitation  OLANZapine Injectable 5 milliGRAM(s) IntraMuscular once PRN severe agitation  OLANZapine Injectable 5 milliGRAM(s) IntraMuscular daily PRN Refusing court ordered PO medication

## 2023-10-19 NOTE — BH INPATIENT PSYCHIATRY PROGRESS NOTE - NSBHMETABOLIC_PSY_ALL_CORE_FT
BMI:   HbA1c: A1C with Estimated Average Glucose Result: 4.9 % (10-18-23 @ 11:45)    Glucose:   BP: --  Lipid Panel: Date/Time: 10-18-23 @ 11:45  Cholesterol, Serum: 123  Direct LDL: --  HDL Cholesterol, Serum: 49  Total Cholesterol/HDL Ration Measurement: --  Triglycerides, Serum: 153
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
## 2023-10-19 NOTE — BH INPATIENT PSYCHIATRY PROGRESS NOTE - MSE UNSTRUCTURED FT
Appearance: grooming-intact, wearing hospital attire  Behavior: uncooperative, mistrustful, no PMA/PMR  Speech: normal rate and volume  Mood: unable to assess  Affect: irritable, though less than prevous  Thought process: illogical, too limited content to assess organization  Thought content: limited interview but significant paranoia towards treating psychiatrist and other staff members, as well as previously reported convoluted suspicions regarding nearly all members of family  Perceptions: no AH, VH elicited, not internally pre-occupied appearing  Insight: extremely poor  Judgement: poor  Cognition: grossly intact   Gait: intact

## 2023-10-19 NOTE — BH INPATIENT PSYCHIATRY PROGRESS NOTE - NSBHCHARTREVIEWVS_PSY_A_CORE FT
Vital Signs Last 24 Hrs  T(C): 36.7 (10-19-23 @ 06:24), Max: 36.7 (10-19-23 @ 06:24)  T(F): 98.1 (10-19-23 @ 06:24), Max: 98.1 (10-19-23 @ 06:24)  HR: --  BP: --  BP(mean): --  RR: --  SpO2: --    Orthostatic VS  10-19-23 @ 06:24  Lying BP: --/-- HR: --  Sitting BP: 125/81 HR: 77  Standing BP: --/-- HR: --  Site: --  Mode: --  Orthostatic VS  10-18-23 @ 08:15  Lying BP: --/-- HR: --  Sitting BP: 99/64 HR: 68  Standing BP: 110/67 HR: 83  Site: --  Mode: --

## 2023-10-19 NOTE — BH TREATMENT PLAN - NSPTSTATEDGOAL_PSY_ALL_CORE
SW to meet with pt. when less symptomatic and more cooperative.

## 2023-10-20 PROCEDURE — 99232 SBSQ HOSP IP/OBS MODERATE 35: CPT

## 2023-10-20 RX ADMIN — Medication 1 PATCH: at 08:37

## 2023-10-20 RX ADMIN — Medication 1 PATCH: at 08:35

## 2023-10-20 RX ADMIN — Medication 1 PATCH: at 18:37

## 2023-10-20 RX ADMIN — OLANZAPINE 5 MILLIGRAM(S): 15 TABLET, FILM COATED ORAL at 08:48

## 2023-10-20 NOTE — BH INPATIENT PSYCHIATRY PROGRESS NOTE - PRN MEDS
MEDICATIONS  (PRN):  acetaminophen     Tablet .. 650 milliGRAM(s) Oral every 6 hours PRN Temp greater or equal to 38C (100.4F), Mild Pain (1 - 3)  LORazepam     Tablet 2 milliGRAM(s) Oral every 6 hours PRN agitation  nicotine  Polacrilex Gum 2 milliGRAM(s) Oral every 2 hours PRN nicotine cravings  OLANZapine 5 milliGRAM(s) Oral every 6 hours PRN agitation  OLANZapine Injectable 5 milliGRAM(s) IntraMuscular once PRN severe agitation  OLANZapine Injectable 5 milliGRAM(s) IntraMuscular daily PRN Refusing court ordered PO medication   MEDICATIONS  (PRN):  acetaminophen     Tablet .. 650 milliGRAM(s) Oral every 6 hours PRN Temp greater or equal to 38C (100.4F), Mild Pain (1 - 3)  LORazepam     Tablet 2 milliGRAM(s) Oral every 6 hours PRN agitation  nicotine  Polacrilex Gum 2 milliGRAM(s) Oral every 2 hours PRN nicotine cravings  OLANZapine 5 milliGRAM(s) Oral every 6 hours PRN agitation  OLANZapine Injectable 5 milliGRAM(s) IntraMuscular daily PRN Refusing court ordered PO medication  OLANZapine Injectable 5 milliGRAM(s) IntraMuscular once PRN severe agitation

## 2023-10-20 NOTE — BH INPATIENT PSYCHIATRY PROGRESS NOTE - MSE UNSTRUCTURED FT
Appearance: grooming-intact, wearing hospital attire  Behavior: uncooperative, mistrustful, no PMA/PMR  Speech: normal rate and volume  Mood: unable to assess  Affect: irritable, though less than prevous  Thought process: illogical, too limited content to assess organization  Thought content: limited interview but significant paranoia towards treating psychiatrist and other staff members, as well as previously reported convoluted suspicions regarding nearly all members of family  Perceptions: no AH, VH elicited, not internally pre-occupied appearing  Insight: extremely poor  Judgement: poor  Cognition: grossly intact   Gait: intact Appearance: grooming-intact, wearing hospital attire  Behavior: uncooperative, mistrustful, no PMA/PMR  Speech: normal rate and volume  Mood: unable to assess  Affect: irritable  Thought process: illogical, too limited content to assess organization  Thought content: limited interview but significant paranoia towards treating psychiatrist and other staff members, as well as previously reported convoluted suspicions regarding nearly all members of family  Perceptions: no AH, VH elicited, not internally pre-occupied appearing  Insight: extremely poor  Judgement: poor  Cognition: grossly intact   Gait: intact
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
## 2023-10-20 NOTE — BH INPATIENT PSYCHIATRY PROGRESS NOTE - NSBHFUPINTERVALHXFT_PSY_A_CORE
VSS, accepting court ordered PO medications, no PRNs for agitation, no acute events, though continues to be paranoid and hyperverbal with staff. VSS, declined PO Abilify today (received IM Zyprexa), no PRNs for agitation, no acute events, though continues to be paranoid and hyperverbal with staff. Per nursing, has been connecting her nicotine patch coming off to the type of laptop that psychiatrist had at court in an illogical fashion.    Patient found in room. Initially, unwilling to speak to this writer, but eventually does stand animatedly when medications discussed. Inform patient that will need to change medication if she refuses PO. Patient asks about target dose of medication. Explain maximum dose of Abilify is 30mg, but that how much she will be prescribed is based on her response. States she doesn't understand what it is targetting or why she needs it. Upon this writer attempting to discuss further, patient irritable and shows this writer out of room.

## 2023-10-20 NOTE — BH INPATIENT PSYCHIATRY PROGRESS NOTE - NSBHCHARTREVIEWVS_PSY_A_CORE FT
Vital Signs Last 24 Hrs  T(C): 36.7 (10-20-23 @ 08:04), Max: 36.7 (10-20-23 @ 08:04)  T(F): 98.1 (10-20-23 @ 08:04), Max: 98.1 (10-20-23 @ 08:04)  HR: --  BP: --  BP(mean): --  RR: --  SpO2: --    Orthostatic VS  10-20-23 @ 08:04  Lying BP: 123/74 HR: 85  Sitting BP: --/-- HR: --  Standing BP: --/-- HR: --  Site: --  Mode: --  Orthostatic VS  10-19-23 @ 06:24  Lying BP: --/-- HR: --  Sitting BP: 125/81 HR: 77  Standing BP: --/-- HR: --  Site: --  Mode: --

## 2023-10-20 NOTE — BH INPATIENT PSYCHIATRY PROGRESS NOTE - CURRENT MEDICATION
MEDICATIONS  (STANDING):  ARIPiprazole 10 milliGRAM(s) Oral daily  nicotine -  14 mG/24Hr(s) Patch 1 Patch Transdermal daily    MEDICATIONS  (PRN):  acetaminophen     Tablet .. 650 milliGRAM(s) Oral every 6 hours PRN Temp greater or equal to 38C (100.4F), Mild Pain (1 - 3)  LORazepam     Tablet 2 milliGRAM(s) Oral every 6 hours PRN agitation  nicotine  Polacrilex Gum 2 milliGRAM(s) Oral every 2 hours PRN nicotine cravings  OLANZapine 5 milliGRAM(s) Oral every 6 hours PRN agitation  OLANZapine Injectable 5 milliGRAM(s) IntraMuscular once PRN severe agitation  OLANZapine Injectable 5 milliGRAM(s) IntraMuscular daily PRN Refusing court ordered PO medication   MEDICATIONS  (STANDING):  ARIPiprazole 10 milliGRAM(s) Oral daily  nicotine -  14 mG/24Hr(s) Patch 1 Patch Transdermal daily    MEDICATIONS  (PRN):  acetaminophen     Tablet .. 650 milliGRAM(s) Oral every 6 hours PRN Temp greater or equal to 38C (100.4F), Mild Pain (1 - 3)  LORazepam     Tablet 2 milliGRAM(s) Oral every 6 hours PRN agitation  nicotine  Polacrilex Gum 2 milliGRAM(s) Oral every 2 hours PRN nicotine cravings  OLANZapine 5 milliGRAM(s) Oral every 6 hours PRN agitation  OLANZapine Injectable 5 milliGRAM(s) IntraMuscular daily PRN Refusing court ordered PO medication  OLANZapine Injectable 5 milliGRAM(s) IntraMuscular once PRN severe agitation

## 2023-10-21 RX ADMIN — ARIPIPRAZOLE 10 MILLIGRAM(S): 15 TABLET ORAL at 08:46

## 2023-10-21 RX ADMIN — Medication 1 PATCH: at 08:47

## 2023-10-22 RX ADMIN — Medication 1 PATCH: at 09:25

## 2023-10-22 RX ADMIN — ARIPIPRAZOLE 10 MILLIGRAM(S): 15 TABLET ORAL at 09:18

## 2023-10-23 PROCEDURE — 90834 PSYTX W PT 45 MINUTES: CPT

## 2023-10-23 PROCEDURE — 99232 SBSQ HOSP IP/OBS MODERATE 35: CPT

## 2023-10-23 PROCEDURE — 90785 PSYTX COMPLEX INTERACTIVE: CPT

## 2023-10-23 RX ORDER — ARIPIPRAZOLE 15 MG/1
15 TABLET ORAL DAILY
Refills: 0 | Status: DISCONTINUED | OUTPATIENT
Start: 2023-10-23 | End: 2023-10-25

## 2023-10-23 RX ADMIN — ARIPIPRAZOLE 10 MILLIGRAM(S): 15 TABLET ORAL at 08:29

## 2023-10-23 RX ADMIN — Medication 1 PATCH: at 08:29

## 2023-10-23 NOTE — BH INPATIENT PSYCHIATRY PROGRESS NOTE - NSBHFUPINTERVALHXFT_PSY_A_CORE
VSS, accepted PO Abilify over weekend, continues to make paranoid statements about staff. VSS, accepted PO Abilify over weekend, continues to make paranoid statements about staff.    Patient found in hallway. She is amenable to speaking to this writer. Calm initially, and able to state she is doing "okay" and denies tremor, stiffness, restlessness. Reports sleep and appetite are good. States  visited, but suspicious when further asked about their relationship. Ask if we can speak to , which we would need to do before discharging home to live with him. Patient will consider.  Review plan for medication dose increase tomorrow, and injection before discharge. She states that this writer is a fabricator of information and though this writer is "assigned" to her, that this writer is not actually her doctor. Patient finds this writer a few minutes after interview, to add that she got a "100 in Human Rights" in school and that this writer is a psychopath.

## 2023-10-23 NOTE — BH INPATIENT PSYCHIATRY PROGRESS NOTE - MSE UNSTRUCTURED FT
Appearance: grooming-intact, wearing hospital attire  Behavior: uncooperative, mistrustful, no PMA/PMR  Speech: normal rate and volume  Mood: unable to assess  Affect: irritable  Thought process: illogical, too limited content to assess organization  Thought content: limited interview but significant paranoia towards treating psychiatrist and other staff members, as well as previously reported convoluted suspicions regarding nearly all members of family  Perceptions: no AH, VH elicited, not internally pre-occupied appearing  Insight: extremely poor  Judgement: poor  Cognition: grossly intact   Gait: intact Appearance: grooming-intact, wearing hospital attire  Behavior: more cooperative today, but remains mistrustful, no PMA/PMR  Speech: hyperverbal when irritated  Mood: "okay"  Affect: irritable edge  Thought process: illogical at times  Thought content: paranoid, though unable to elicit luciana delusions on limited interveiw  Perceptions: no AH, VH elicited, not internally pre-occupied appearing  Insight: extremely poor  Judgement: poor  Cognition: grossly intact   Gait: intact

## 2023-10-23 NOTE — BH PATIENT CHARACTERISTICS SURVEY - NSPCSLIVINGSITUA_PSY_ALL_CORE
Private residence (home, apartment, rooming house, hotel, motel, supported housing, supported Single Room Occupancy (SRO), permanent housing programs, transient housing programs, and shelter plus care housing
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
## 2023-10-23 NOTE — BH PSYCHOLOGY - CLINICIAN PSYCHOTHERAPY NOTE - NSBHPSYCHOLNARRATIVE_PSY_A_CORE FT
Patient was visibly agitated regarding a phone call with the treatment team and her ; she asked the Supervising Psychologist to listen in on the call but not to say anything. Patient had given verbal consent to the team to speak with her  earlier in the afternoon. After the phone call, patient demanded to know what the  said during the call. She was visibly agitated and the writer observed her going to the pay phones to call her  several times, raising her voice often. Writer approached her with the consent forms. She demanded the writer speak with her ; writer explained he could not unless he received written consent to speak with him. Patient then outlined and crossed out several items on the consent form; writer explained he could not speak with her  unless she corrected and initialed the items giving her permission. She initialed and signed giving consent to speak about her treatment.    Writer called the  who expressed his and his family's concern related to the patient. He agreed to the writer's suggestion for a behavior plan limiting the amount of phone calls to the family during the day, reducing it to three concrete times. Writer also explained the medications over objection and recent court ruling, explaining the hospital must now administer medications to the patient or be in violation of the court order.  asked if he could visit and bring food that was requested by the patient; writer agreed it was fine. Writer encouraged that  to contact the team if something were to come up; he agreed to do so.

## 2023-10-23 NOTE — BH INPATIENT PSYCHIATRY PROGRESS NOTE - NSBHCHARTREVIEWVS_PSY_A_CORE FT
Vital Signs Last 24 Hrs  T(C): 36.7 (10-22-23 @ 08:35), Max: 36.7 (10-22-23 @ 08:35)  T(F): 98.1 (10-22-23 @ 08:35), Max: 98.1 (10-22-23 @ 08:35)  HR: --  BP: --  BP(mean): --  RR: --  SpO2: --    Orthostatic VS  10-22-23 @ 08:35  Lying BP: --/-- HR: --  Sitting BP: 117/77 HR: 84  Standing BP: --/-- HR: --  Site: --  Mode: --  Orthostatic VS  10-21-23 @ 08:29  Lying BP: --/-- HR: --  Sitting BP: 120/80 HR: 80  Standing BP: --/-- HR: --  Site: --  Mode: --   Vital Signs Last 24 Hrs  T(C): 36.5 (10-23-23 @ 09:04), Max: 36.5 (10-23-23 @ 09:04)  T(F): 97.7 (10-23-23 @ 09:04), Max: 97.7 (10-23-23 @ 09:04)  HR: --  BP: --  BP(mean): --  RR: --  SpO2: --    Orthostatic VS  10-23-23 @ 09:04  Lying BP: --/-- HR: --  Sitting BP: 104/67 HR: 75  Standing BP: --/-- HR: --  Site: --  Mode: --  Orthostatic VS  10-22-23 @ 08:35  Lying BP: --/-- HR: --  Sitting BP: 117/77 HR: 84  Standing BP: --/-- HR: --  Site: --  Mode: --

## 2023-10-23 NOTE — BH INPATIENT PSYCHIATRY PROGRESS NOTE - CURRENT MEDICATION
MEDICATIONS  (STANDING):  ARIPiprazole 10 milliGRAM(s) Oral daily  nicotine -  14 mG/24Hr(s) Patch 1 Patch Transdermal daily    MEDICATIONS  (PRN):  acetaminophen     Tablet .. 650 milliGRAM(s) Oral every 6 hours PRN Temp greater or equal to 38C (100.4F), Mild Pain (1 - 3)  LORazepam     Tablet 2 milliGRAM(s) Oral every 6 hours PRN agitation  nicotine  Polacrilex Gum 2 milliGRAM(s) Oral every 2 hours PRN nicotine cravings  OLANZapine 5 milliGRAM(s) Oral every 6 hours PRN agitation  OLANZapine Injectable 5 milliGRAM(s) IntraMuscular once PRN severe agitation  OLANZapine Injectable 5 milliGRAM(s) IntraMuscular daily PRN Refusing court ordered PO medication   MEDICATIONS  (STANDING):  ARIPiprazole 15 milliGRAM(s) Oral daily  nicotine -  14 mG/24Hr(s) Patch 1 Patch Transdermal daily    MEDICATIONS  (PRN):  acetaminophen     Tablet .. 650 milliGRAM(s) Oral every 6 hours PRN Temp greater or equal to 38C (100.4F), Mild Pain (1 - 3)  LORazepam     Tablet 2 milliGRAM(s) Oral every 6 hours PRN agitation  nicotine  Polacrilex Gum 2 milliGRAM(s) Oral every 2 hours PRN nicotine cravings  OLANZapine 5 milliGRAM(s) Oral every 6 hours PRN agitation  OLANZapine Injectable 5 milliGRAM(s) IntraMuscular once PRN severe agitation  OLANZapine Injectable 5 milliGRAM(s) IntraMuscular daily PRN Refusing court ordered PO medication

## 2023-10-24 PROCEDURE — 99232 SBSQ HOSP IP/OBS MODERATE 35: CPT

## 2023-10-24 RX ORDER — NICOTINE POLACRILEX 2 MG
2 GUM BUCCAL
Refills: 0 | Status: DISCONTINUED | OUTPATIENT
Start: 2023-10-24 | End: 2023-12-05

## 2023-10-24 RX ADMIN — Medication 1 PATCH: at 09:40

## 2023-10-24 RX ADMIN — ARIPIPRAZOLE 15 MILLIGRAM(S): 15 TABLET ORAL at 09:01

## 2023-10-24 NOTE — BH INPATIENT PSYCHIATRY PROGRESS NOTE - NSBHCHARTREVIEWVS_PSY_A_CORE FT
Vital Signs Last 24 Hrs  T(C): 36.5 (10-23-23 @ 09:04), Max: 36.5 (10-23-23 @ 09:04)  T(F): 97.7 (10-23-23 @ 09:04), Max: 97.7 (10-23-23 @ 09:04)  HR: --  BP: --  BP(mean): --  RR: --  SpO2: --    Orthostatic VS  10-23-23 @ 09:04  Lying BP: --/-- HR: --  Sitting BP: 104/67 HR: 75  Standing BP: --/-- HR: --  Site: --  Mode: --  Orthostatic VS  10-22-23 @ 08:35  Lying BP: --/-- HR: --  Sitting BP: 117/77 HR: 84  Standing BP: --/-- HR: --  Site: --  Mode: --   Vital Signs Last 24 Hrs  T(C): --  T(F): --  HR: --  BP: --  BP(mean): --  RR: --  SpO2: --    Orthostatic VS  10-23-23 @ 09:04  Lying BP: --/-- HR: --  Sitting BP: 104/67 HR: 75  Standing BP: --/-- HR: --  Site: --  Mode: --

## 2023-10-24 NOTE — BH INPATIENT PSYCHIATRY PROGRESS NOTE - MSE UNSTRUCTURED FT
Appearance: grooming-intact, wearing hospital attire  Behavior: more cooperative today, but remains mistrustful, no PMA/PMR  Speech: hyperverbal when irritated  Mood: "okay"  Affect: irritable edge  Thought process: illogical at times  Thought content: paranoid, though unable to elicit luciana delusions on limited interveiw  Perceptions: no AH, VH elicited, not internally pre-occupied appearing  Insight: extremely poor  Judgement: poor  Cognition: grossly intact   Gait: intact Appearance: grooming-intact, wearing hospital attire  Behavior: guarded, hostile, mistrustful, no PMA/PMR  Speech: hyperverbal  Mood: "fine"  Affect: irritable  Thought process: illogical and disorganized  Thought content: paranoid, though unable to elicit luciana delusions on limited interview  Perceptions: no AH, VH elicited, not internally pre-occupied appearing  Insight: extremely poor  Judgement: poor  Cognition: grossly intact   Gait: intact

## 2023-10-24 NOTE — BH INPATIENT PSYCHIATRY PROGRESS NOTE - NSBHFUPINTERVALHXFT_PSY_A_CORE
VSS, accepting medications, no PRNs for agitation, no acute events.  VSS, accepting medications, no PRNs for agitation, no acute events. Patient seen in [place]. She tries to dodge this writer stating she doesn't want to talk. Eventually states that her  wrote a note stating she is not sick. When this writer asked about arguing with her  on phone (as noted and reported by staff), patient waves her hand and states that this writer made this up. States she is feeling fine physically.
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
## 2023-10-24 NOTE — BH INPATIENT PSYCHIATRY PROGRESS NOTE - CURRENT MEDICATION
MEDICATIONS  (STANDING):  ARIPiprazole 15 milliGRAM(s) Oral daily  nicotine -  14 mG/24Hr(s) Patch 1 Patch Transdermal daily    MEDICATIONS  (PRN):  acetaminophen     Tablet .. 650 milliGRAM(s) Oral every 6 hours PRN Temp greater or equal to 38C (100.4F), Mild Pain (1 - 3)  LORazepam     Tablet 2 milliGRAM(s) Oral every 6 hours PRN agitation  nicotine  Polacrilex Gum 2 milliGRAM(s) Oral every 2 hours PRN nicotine cravings  OLANZapine 5 milliGRAM(s) Oral every 6 hours PRN agitation  OLANZapine Injectable 5 milliGRAM(s) IntraMuscular once PRN severe agitation  OLANZapine Injectable 5 milliGRAM(s) IntraMuscular daily PRN Refusing court ordered PO medication

## 2023-10-25 PROCEDURE — 90837 PSYTX W PT 60 MINUTES: CPT

## 2023-10-25 PROCEDURE — 90785 PSYTX COMPLEX INTERACTIVE: CPT

## 2023-10-25 PROCEDURE — 99232 SBSQ HOSP IP/OBS MODERATE 35: CPT

## 2023-10-25 RX ORDER — ARIPIPRAZOLE 15 MG/1
20 TABLET ORAL DAILY
Refills: 0 | Status: DISCONTINUED | OUTPATIENT
Start: 2023-10-25 | End: 2023-10-30

## 2023-10-25 RX ADMIN — Medication 1 PATCH: at 08:43

## 2023-10-25 RX ADMIN — ARIPIPRAZOLE 15 MILLIGRAM(S): 15 TABLET ORAL at 08:43
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
## 2023-10-25 NOTE — BH PSYCHOLOGY - CLINICIAN PSYCHOTHERAPY NOTE - NSBHPSYCHOLBILLIND_PSY_A_CORE
09573 - Interactive Complexity (Add on code for maladaptive communication, emotional/behavioral conditions, mandated reporting or equipment/device/ services)
74113 - Interactive Complexity (Add on code for maladaptive communication, emotional/behavioral conditions, mandated reporting or equipment/device/ services)

## 2023-10-25 NOTE — BH INPATIENT PSYCHIATRY PROGRESS NOTE - MSE UNSTRUCTURED FT
Appearance: grooming-intact, wearing hospital attire  Behavior: guarded, hostile, mistrustful, no PMA/PMR  Speech: hyperverbal  Mood: "fine"  Affect: irritable  Thought process: illogical and disorganized  Thought content: paranoid, though unable to elicit luciana delusions on limited interview  Perceptions: no AH, VH elicited, not internally pre-occupied appearing  Insight: extremely poor  Judgement: poor  Cognition: grossly intact   Gait: intact Appearance: grooming-intact, wearing hospital attire  Behavior: guarded, mistrustful, no PMA/PMR  Speech: hyperverbal, difficult to interrupt  Mood: "fine"  Affect: irritable  Thought process: illogical and disorganized at times  Thought content: paranoid, though unable to elicit luciana delusions on limited interview  Perceptions: no AH, VH elicited, not internally pre-occupied appearing  Insight: extremely poor  Judgement: poor  Cognition: grossly intact   Gait: intact

## 2023-10-25 NOTE — BH INPATIENT PSYCHIATRY PROGRESS NOTE - NSBHCHARTREVIEWVS_PSY_A_CORE FT
Vital Signs Last 24 Hrs  T(C): 36.7 (10-25-23 @ 07:49), Max: 36.7 (10-25-23 @ 07:49)  T(F): 98 (10-25-23 @ 07:49), Max: 98 (10-25-23 @ 07:49)  HR: --  BP: --  BP(mean): --  RR: --  SpO2: --    Orthostatic VS  10-25-23 @ 07:49  Lying BP: --/-- HR: --  Sitting BP: 110/87 HR: 75  Standing BP: --/-- HR: --  Site: --  Mode: --  Orthostatic VS  10-23-23 @ 09:04  Lying BP: --/-- HR: --  Sitting BP: 104/67 HR: 75  Standing BP: --/-- HR: --  Site: --  Mode: --   Vital Signs Last 24 Hrs  T(C): 36.7 (10-25-23 @ 07:49), Max: 36.7 (10-25-23 @ 07:49)  T(F): 98 (10-25-23 @ 07:49), Max: 98 (10-25-23 @ 07:49)  HR: --  BP: --  BP(mean): --  RR: --  SpO2: --    Orthostatic VS  10-25-23 @ 07:49  Lying BP: --/-- HR: --  Sitting BP: 110/87 HR: 75  Standing BP: --/-- HR: --  Site: --  Mode: --
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
## 2023-10-25 NOTE — BH INPATIENT PSYCHIATRY PROGRESS NOTE - CURRENT MEDICATION
MEDICATIONS  (STANDING):  ARIPiprazole 15 milliGRAM(s) Oral daily  nicotine -  14 mG/24Hr(s) Patch 1 Patch Transdermal daily    MEDICATIONS  (PRN):  acetaminophen     Tablet .. 650 milliGRAM(s) Oral every 6 hours PRN Temp greater or equal to 38C (100.4F), Mild Pain (1 - 3)  LORazepam     Tablet 2 milliGRAM(s) Oral every 6 hours PRN agitation  nicotine  Polacrilex Gum 2 milliGRAM(s) Oral every 2 hours PRN nicotine cravings  OLANZapine 5 milliGRAM(s) Oral every 6 hours PRN agitation  OLANZapine Injectable 5 milliGRAM(s) IntraMuscular once PRN severe agitation  OLANZapine Injectable 5 milliGRAM(s) IntraMuscular daily PRN Refusing court ordered PO medication   MEDICATIONS  (STANDING):  ARIPiprazole 20 milliGRAM(s) Oral daily  nicotine -  14 mG/24Hr(s) Patch 1 Patch Transdermal daily    MEDICATIONS  (PRN):  acetaminophen     Tablet .. 650 milliGRAM(s) Oral every 6 hours PRN Temp greater or equal to 38C (100.4F), Mild Pain (1 - 3)  LORazepam     Tablet 2 milliGRAM(s) Oral every 6 hours PRN agitation  nicotine  Polacrilex Gum 2 milliGRAM(s) Oral every 2 hours PRN nicotine cravings  OLANZapine 5 milliGRAM(s) Oral every 6 hours PRN agitation  OLANZapine Injectable 5 milliGRAM(s) IntraMuscular daily PRN Refusing court ordered PO medication  OLANZapine Injectable 5 milliGRAM(s) IntraMuscular once PRN severe agitation

## 2023-10-25 NOTE — BH PSYCHOLOGY - CLINICIAN PSYCHOTHERAPY NOTE - NSBHPSYCHOLNARRATIVE_PSY_A_CORE FT
Treatment team expressed concerns related to the Patient's behaviors and impact on other patients on the unit; a behavior plan was requested from the Supervising Psychologist. Writer had also observed concerning behaviors including yelling and cursing by the Patient towards her  on the phone for a prolonged period of time. Writer also observed its negative impact on other patients (i.e., monopolizing of the phones, agitated behavior of the patient upsetting the other patients). Behavior plan to address the time spent on the phone was presented to the Patient, who minimized her behaviors as well as any responsibility (including blaming her  for her outbursts). Writer addressed her inquiries regarding the behavior plan.  Behavior Plan  Patient Name: 	Zhane GUZMAN  Date of Plan:	10/25/23    Explain the behavior you want to change: Ms. Guzman has been using the phone on the unit excessively and observed to become visibly agitated as well as having some difficulties responding to staff redirection   Reasons for the behavior: Due to her belief that she is hospitalized unjustly, she has made phone calls to family that result in her visible agitation and verbal aggression  Appropriate replacement behavior(s):   •Using the phone on the unit for appropriate calls  •Allowing other patients to use the phone with a reasonable level of privacy  • Responding to staff redirection    Support – How to make the adaptive/healthy behavior happen more often:  Each Day:   o	Patient will be allowed 3 phone calls daily: one phone call in the morning (before noon) and one phone call in the afternoon (after 12p) and one phone call in the evening (between 6p – 10p)  ?	Ms. Guzman is only to use the Female Hallway phone  ?	She is permitted to receive phone calls  o	Each phone call will be no longer than 30 minutes  ?	If she calls more than three times a day OR if she is observed to become verbally agitated/aggressive during calls OR she uses the other two phones, she will forfeit the next allowed phone call (but earn the chance to use the phone for next allotted session)  ?	If Ms. Guzman escalates and appears to have difficulties keeping herself or others safe, the following protocol will be initiated (see below):    **Due to the Patient’s Difficulties Engaging with Staff Appropriately: If patient becomes verbally/physically aggressive towards staff/patients:  o	Staff will ask Patient to go to room / Calming Room and redirect three times (allowing 30 seconds each time) to comply;  o	If Patient does not comply after the Third Request:  •	Prepare oral (and IM) PRN medications   •	Call Support Team then escort Patient to room (or calming room)  •	and encourage oral PRN medications first: one offer.  o	If Patient still does not comply, then:  •	Call Psych Emergency  •	Escort to room (or seclusion room)  •	Encourage IM medications  (Restraints / Seclusion and IM meds to be used to maintain safety of the unit and as a last resort.)    Evaluate – How will you know if it is working: Ms. Guzman will be able to demonstrate effective use of telephone on unit and respond to staff redirection

## 2023-10-26 PROCEDURE — 99232 SBSQ HOSP IP/OBS MODERATE 35: CPT

## 2023-10-26 RX ADMIN — Medication 1 PATCH: at 08:52

## 2023-10-26 RX ADMIN — ARIPIPRAZOLE 20 MILLIGRAM(S): 15 TABLET ORAL at 08:27

## 2023-10-26 RX ADMIN — Medication 1 PATCH: at 08:02

## 2023-10-26 NOTE — BH INPATIENT PSYCHIATRY PROGRESS NOTE - MSE UNSTRUCTURED FT
Appearance: grooming-intact, wearing hospital attire  Behavior: guarded, mistrustful, no PMA/PMR  Speech: hyperverbal, difficult to interrupt  Mood: "fine"  Affect: irritable  Thought process: illogical and disorganized at times  Thought content: paranoid, though unable to elicit luciana delusions on limited interview  Perceptions: no AH, VH elicited, not internally pre-occupied appearing  Insight: extremely poor  Judgement: poor  Cognition: grossly intact   Gait: intact Appearance: grooming-intact, wearing hospital attire  Behavior: guarded, mistrustful, no PMA/PMR  Speech: hyperverbal, uninterruptible at times  Mood: "fine"  Affect: irritable  Thought process: illogical and difficult to follow at times, though less instances of luciana disorganization  Thought content: paranoid, though unable to elicit luciana delusions on limited interview  Perceptions: no AH, VH elicited, not internally pre-occupied appearing  Insight: extremely poor  Judgement: poor  Cognition: grossly intact   Gait: intact

## 2023-10-26 NOTE — BH INPATIENT PSYCHIATRY PROGRESS NOTE - NSBHCHARTREVIEWVS_PSY_A_CORE FT
Vital Signs Last 24 Hrs  T(C): 36.3 (10-26-23 @ 08:22), Max: 36.3 (10-26-23 @ 08:22)  T(F): 97.4 (10-26-23 @ 08:22), Max: 97.4 (10-26-23 @ 08:22)  HR: --  BP: --  BP(mean): --  RR: 18 (10-26-23 @ 08:22) (18 - 18)  SpO2: --    Orthostatic VS  10-26-23 @ 08:22  Lying BP: --/-- HR: --  Sitting BP: 116/86 HR: 95  Standing BP: --/-- HR: --  Site: --  Mode: --  Orthostatic VS  10-25-23 @ 07:49  Lying BP: --/-- HR: --  Sitting BP: 110/87 HR: 75  Standing BP: --/-- HR: --  Site: --  Mode: --

## 2023-10-26 NOTE — BH INPATIENT PSYCHIATRY PROGRESS NOTE - CURRENT MEDICATION
MEDICATIONS  (STANDING):  ARIPiprazole 20 milliGRAM(s) Oral daily  nicotine -  14 mG/24Hr(s) Patch 1 Patch Transdermal daily    MEDICATIONS  (PRN):  acetaminophen     Tablet .. 650 milliGRAM(s) Oral every 6 hours PRN Temp greater or equal to 38C (100.4F), Mild Pain (1 - 3)  LORazepam     Tablet 2 milliGRAM(s) Oral every 6 hours PRN agitation  nicotine  Polacrilex Gum 2 milliGRAM(s) Oral every 2 hours PRN nicotine cravings  OLANZapine 5 milliGRAM(s) Oral every 6 hours PRN agitation  OLANZapine Injectable 5 milliGRAM(s) IntraMuscular once PRN severe agitation  OLANZapine Injectable 5 milliGRAM(s) IntraMuscular daily PRN Refusing court ordered PO medication   MEDICATIONS  (STANDING):  ARIPiprazole 20 milliGRAM(s) Oral daily  nicotine -  14 mG/24Hr(s) Patch 1 Patch Transdermal daily    MEDICATIONS  (PRN):  acetaminophen     Tablet .. 650 milliGRAM(s) Oral every 6 hours PRN Temp greater or equal to 38C (100.4F), Mild Pain (1 - 3)  LORazepam     Tablet 2 milliGRAM(s) Oral every 6 hours PRN agitation  nicotine  Polacrilex Gum 2 milliGRAM(s) Oral every 2 hours PRN nicotine cravings  OLANZapine 5 milliGRAM(s) Oral every 6 hours PRN agitation  OLANZapine Injectable 5 milliGRAM(s) IntraMuscular daily PRN Refusing court ordered PO medication  OLANZapine Injectable 5 milliGRAM(s) IntraMuscular once PRN severe agitation
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
## 2023-10-26 NOTE — BH INPATIENT PSYCHIATRY PROGRESS NOTE - NSBHFUPINTERVALHXFT_PSY_A_CORE
VSS, accepting PO medications, no PRNs for agitation, no acute events though was reported telling staff "the white people are out to get us." VSS, accepting PO medications, no PRNs for agitation, no acute events though was reported telling staff "the white people are out to get us."    Patient amenable to interview seated in dayroom. Patient initially calm, stating she feels fine, and denies any physical issues including muscle stiffness, tremor or shakiness. She then becomes more irritable with this writer when asking about timing for medication changes and hospitalization. Discuss plan to apply for AOT. Patient very angry about this, states that this writer is making up that other staff are concerned about her or feel she is sick. States she is going to call the AMA to place a formal complaint about this writer. States this writer is a criminal and fabricates information. When asked, unwilling to specify what information she feels this writer has lied about. At end of interview, patient stands up from chair, leans over table towards this writer, and speaks loudly to this writer in an uninterruptable manner, and then quickly gets up to leave.

## 2023-10-27 PROCEDURE — 90853 GROUP PSYCHOTHERAPY: CPT

## 2023-10-27 PROCEDURE — 99232 SBSQ HOSP IP/OBS MODERATE 35: CPT

## 2023-10-27 RX ADMIN — Medication 1 PATCH: at 13:44

## 2023-10-27 RX ADMIN — ARIPIPRAZOLE 20 MILLIGRAM(S): 15 TABLET ORAL at 08:26

## 2023-10-27 NOTE — BH INPATIENT PSYCHIATRY PROGRESS NOTE - MSE UNSTRUCTURED FT
Appearance: grooming-intact, wearing hospital attire  Behavior: guarded, mistrustful, no PMA/PMR  Speech: hyperverbal, uninterruptible at times  Mood: "fine"  Affect: irritable, angry  Thought process: more organized overall but still with moments of illogical stateements and slightly disorganized speech  Thought content: remains exquisitely paranoid, though unable to elicit luciana delusions on limited interview  Perceptions: no AH, VH elicited, not internally pre-occupied appearing  Insight: extremely poor  Judgement: poor  Cognition: grossly intact   Gait: intact

## 2023-10-27 NOTE — BH INPATIENT PSYCHIATRY PROGRESS NOTE - NSBHMETABOLIC_PSY_ALL_CORE_FT
BMI:   HbA1c: A1C with Estimated Average Glucose Result: 4.9 % (10-18-23 @ 11:45)    Glucose:   BP: 105/67 (10-27-23 @ 08:17) (105/67 - 105/67)  Lipid Panel: Date/Time: 10-18-23 @ 11:45  Cholesterol, Serum: 123  Direct LDL: --  HDL Cholesterol, Serum: 49  Total Cholesterol/HDL Ration Measurement: --  Triglycerides, Serum: 153
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
## 2023-10-27 NOTE — BH INPATIENT PSYCHIATRY PROGRESS NOTE - CURRENT MEDICATION
MEDICATIONS  (STANDING):  ARIPiprazole 20 milliGRAM(s) Oral daily  nicotine -  14 mG/24Hr(s) Patch 1 Patch Transdermal daily    MEDICATIONS  (PRN):  acetaminophen     Tablet .. 650 milliGRAM(s) Oral every 6 hours PRN Temp greater or equal to 38C (100.4F), Mild Pain (1 - 3)  LORazepam     Tablet 2 milliGRAM(s) Oral every 6 hours PRN agitation  nicotine  Polacrilex Gum 2 milliGRAM(s) Oral every 2 hours PRN nicotine cravings  OLANZapine 5 milliGRAM(s) Oral every 6 hours PRN agitation  OLANZapine Injectable 5 milliGRAM(s) IntraMuscular daily PRN Refusing court ordered PO medication  OLANZapine Injectable 5 milliGRAM(s) IntraMuscular once PRN severe agitation

## 2023-10-27 NOTE — BH INPATIENT PSYCHIATRY PROGRESS NOTE - NSBHCHARTREVIEWVS_PSY_A_CORE FT
Vital Signs Last 24 Hrs  T(C): 36.7 (10-27-23 @ 08:17), Max: 36.7 (10-27-23 @ 08:17)  T(F): 98.1 (10-27-23 @ 08:17), Max: 98.1 (10-27-23 @ 08:17)  HR: --  BP: 105/67 (10-27-23 @ 08:17) (105/67 - 105/67)  BP(mean): 88 (10-27-23 @ 08:17) (88 - 88)  RR: --  SpO2: --    Orthostatic VS  10-26-23 @ 08:22  Lying BP: --/-- HR: --  Sitting BP: 116/86 HR: 95  Standing BP: --/-- HR: --  Site: --  Mode: --

## 2023-10-27 NOTE — BH INPATIENT PSYCHIATRY PROGRESS NOTE - PRN MEDS
MEDICATIONS  (PRN):  acetaminophen     Tablet .. 650 milliGRAM(s) Oral every 6 hours PRN Temp greater or equal to 38C (100.4F), Mild Pain (1 - 3)  LORazepam     Tablet 2 milliGRAM(s) Oral every 6 hours PRN agitation  nicotine  Polacrilex Gum 2 milliGRAM(s) Oral every 2 hours PRN nicotine cravings  OLANZapine 5 milliGRAM(s) Oral every 6 hours PRN agitation  OLANZapine Injectable 5 milliGRAM(s) IntraMuscular daily PRN Refusing court ordered PO medication  OLANZapine Injectable 5 milliGRAM(s) IntraMuscular once PRN severe agitation

## 2023-10-27 NOTE — BH INPATIENT PSYCHIATRY PROGRESS NOTE - NSBHFUPINTERVALHXFT_PSY_A_CORE
VSS, accepting PO medications, no PRNs for agitation, no acute events. Patient seen outside room. She states she is "fine, I'm always fine." Denies muscles stiffness, tremor, restlessness. Continues to state that I am lying about her, have made up information and that I am a criminal. Terminates interview.

## 2023-10-28 RX ADMIN — Medication 1 PATCH: at 11:21

## 2023-10-28 RX ADMIN — Medication 1 PATCH: at 19:50

## 2023-10-28 RX ADMIN — Medication 1 PATCH: at 09:57

## 2023-10-28 RX ADMIN — ARIPIPRAZOLE 20 MILLIGRAM(S): 15 TABLET ORAL at 08:16

## 2023-10-29 RX ADMIN — Medication 1 PATCH: at 16:20

## 2023-10-29 RX ADMIN — ARIPIPRAZOLE 20 MILLIGRAM(S): 15 TABLET ORAL at 09:40

## 2023-10-29 RX ADMIN — Medication 1 PATCH: at 07:22

## 2023-10-29 RX ADMIN — Medication 1 PATCH: at 09:39

## 2023-10-30 PROCEDURE — 99232 SBSQ HOSP IP/OBS MODERATE 35: CPT

## 2023-10-30 RX ORDER — ARIPIPRAZOLE 15 MG/1
25 TABLET ORAL DAILY
Refills: 0 | Status: DISCONTINUED | OUTPATIENT
Start: 2023-10-31 | End: 2023-10-31

## 2023-10-30 RX ORDER — ARIPIPRAZOLE 15 MG/1
5 TABLET ORAL ONCE
Refills: 0 | Status: COMPLETED | OUTPATIENT
Start: 2023-10-30 | End: 2023-10-30

## 2023-10-30 RX ADMIN — ARIPIPRAZOLE 5 MILLIGRAM(S): 15 TABLET ORAL at 11:13

## 2023-10-30 RX ADMIN — Medication 1 PATCH: at 08:15

## 2023-10-30 RX ADMIN — ARIPIPRAZOLE 20 MILLIGRAM(S): 15 TABLET ORAL at 08:15

## 2023-10-30 NOTE — BH INPATIENT PSYCHIATRY PROGRESS NOTE - CURRENT MEDICATION
MEDICATIONS  (STANDING):  ARIPiprazole 20 milliGRAM(s) Oral daily  nicotine -  14 mG/24Hr(s) Patch 1 Patch Transdermal daily    MEDICATIONS  (PRN):  acetaminophen     Tablet .. 650 milliGRAM(s) Oral every 6 hours PRN Temp greater or equal to 38C (100.4F), Mild Pain (1 - 3)  LORazepam     Tablet 2 milliGRAM(s) Oral every 6 hours PRN agitation  nicotine  Polacrilex Gum 2 milliGRAM(s) Oral every 2 hours PRN nicotine cravings  OLANZapine 5 milliGRAM(s) Oral every 6 hours PRN agitation  OLANZapine Injectable 5 milliGRAM(s) IntraMuscular once PRN severe agitation  OLANZapine Injectable 5 milliGRAM(s) IntraMuscular daily PRN Refusing court ordered PO medication   MEDICATIONS  (STANDING):  ARIPiprazole 25 milliGRAM(s) Oral daily  nicotine -  14 mG/24Hr(s) Patch 1 Patch Transdermal daily    MEDICATIONS  (PRN):  acetaminophen     Tablet .. 650 milliGRAM(s) Oral every 6 hours PRN Temp greater or equal to 38C (100.4F), Mild Pain (1 - 3)  LORazepam     Tablet 2 milliGRAM(s) Oral every 6 hours PRN agitation  nicotine  Polacrilex Gum 2 milliGRAM(s) Oral every 2 hours PRN nicotine cravings  OLANZapine 5 milliGRAM(s) Oral every 6 hours PRN agitation  OLANZapine Injectable 5 milliGRAM(s) IntraMuscular once PRN severe agitation  OLANZapine Injectable 5 milliGRAM(s) IntraMuscular daily PRN Refusing court ordered PO medication

## 2023-10-30 NOTE — BH INPATIENT PSYCHIATRY PROGRESS NOTE - NSBHFUPINTERVALHXFT_PSY_A_CORE
VSS, accepting PO Abilify, no PRNs for agitation, no acute events. Per nursing notes, continues to be incoherent, paranoid of family VSS, accepting PO Abilify, no PRNs for agitation, no acute events. Per nursing notes, continues to be incoherent, paranoid of family.    Patient amenable to meeting in dayroom. Reports she feelings good, and denies physical concerns, including muscle stiffness, tremor, restlessness. She is notably more open and less guarded with this provider, sharing similar stories to admission about issues her  and brother-in-law have. Denies issues with staff. States she would like to go back to school and be a human rights . Eventually gets irritable towards end of interview and walks away and states "I got a 100 in logic."

## 2023-10-30 NOTE — BH INPATIENT PSYCHIATRY PROGRESS NOTE - MSE UNSTRUCTURED FT
Appearance: grooming-intact, wearing hospital attire  Behavior: guarded, mistrustful, no PMA/PMR  Speech: hyperverbal, uninterruptible at times  Mood: "fine"  Affect: irritable, angry  Thought process: more organized overall but still with moments of illogical stateements and slightly disorganized speech  Thought content: remains exquisitely paranoid, though unable to elicit luciana delusions on limited interview  Perceptions: no AH, VH elicited, not internally pre-occupied appearing  Insight: extremely poor  Judgement: poor  Cognition: grossly intact   Gait: intact Appearance: grooming-intact, wearing hospital attire  Behavior: guarded, mistrustful, no PMA/PMR  Speech: hyperverbal, uninterruptible at times  Mood: "good"  Affect: irritable, but less so than previous  Thought process: more organized overall but still with moments of illogical statements and slightly disorganized speech  Thought content: remains paranoid, but increasingly willing to engage with this provider, and less paranoid towards other staff  Perceptions: no AH, VH elicited, not internally pre-occupied appearing  Insight: extremely poor  Judgement: poor  Cognition: grossly intact   Gait: intact

## 2023-10-30 NOTE — BH INPATIENT PSYCHIATRY PROGRESS NOTE - NSBHMETABOLIC_PSY_ALL_CORE_FT
BMI:   HbA1c: A1C with Estimated Average Glucose Result: 4.9 % (10-18-23 @ 11:45)    Glucose:   BP: 110/69 (10-30-23 @ 08:32) (110/69 - 110/69)  Lipid Panel: Date/Time: 10-18-23 @ 11:45  Cholesterol, Serum: 123  Direct LDL: --  HDL Cholesterol, Serum: 49  Total Cholesterol/HDL Ration Measurement: --  Triglycerides, Serum: 153

## 2023-10-30 NOTE — BH INPATIENT PSYCHIATRY PROGRESS NOTE - NSBHCHARTREVIEWVS_PSY_A_CORE FT
Vital Signs Last 24 Hrs  T(C): 36.6 (10-30-23 @ 08:32), Max: 36.6 (10-30-23 @ 08:32)  T(F): 97.8 (10-30-23 @ 08:32), Max: 97.8 (10-30-23 @ 08:32)  HR: --  BP: 110/69 (10-30-23 @ 08:32) (110/69 - 110/69)  BP(mean): 88 (10-30-23 @ 08:32) (88 - 88)  RR: --  SpO2: --    Orthostatic VS  10-29-23 @ 08:21  Lying BP: --/-- HR: --  Sitting BP: 102/57 HR: 60  Standing BP: --/-- HR: --  Site: --  Mode: --
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
## 2023-10-31 PROCEDURE — 99232 SBSQ HOSP IP/OBS MODERATE 35: CPT

## 2023-10-31 RX ORDER — TRAZODONE HCL 50 MG
50 TABLET ORAL ONCE
Refills: 0 | Status: DISCONTINUED | OUTPATIENT
Start: 2023-10-31 | End: 2023-12-05

## 2023-10-31 RX ORDER — ARIPIPRAZOLE 15 MG/1
30 TABLET ORAL DAILY
Refills: 0 | Status: DISCONTINUED | OUTPATIENT
Start: 2023-10-31 | End: 2023-11-06

## 2023-10-31 RX ADMIN — ARIPIPRAZOLE 25 MILLIGRAM(S): 15 TABLET ORAL at 08:28

## 2023-10-31 RX ADMIN — Medication 2 MILLIGRAM(S): at 01:15

## 2023-10-31 RX ADMIN — Medication 1 PATCH: at 08:29
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
## 2023-10-31 NOTE — DIETITIAN INITIAL EVALUATION ADULT - PERTINENT LABORATORY DATA
Glucose: 100 mg/dL (10.18.23 @ 11:45)     A1C with Estimated Average Glucose Result: 4.9 % (10-18-23 @ 11:45)    Lipid Panel: Date/Time: 10-18-23 @ 11:45  Cholesterol, Serum: 123  Direct LDL: --  HDL Cholesterol, Serum: 49  Total Cholesterol/HDL Ration Measurement: --  Triglycerides, Serum: 153

## 2023-10-31 NOTE — BH INPATIENT PSYCHIATRY PROGRESS NOTE - CURRENT MEDICATION
MEDICATIONS  (STANDING):  ARIPiprazole 25 milliGRAM(s) Oral daily  nicotine -  14 mG/24Hr(s) Patch 1 Patch Transdermal daily    MEDICATIONS  (PRN):  acetaminophen     Tablet .. 650 milliGRAM(s) Oral every 6 hours PRN Temp greater or equal to 38C (100.4F), Mild Pain (1 - 3)  LORazepam     Tablet 2 milliGRAM(s) Oral every 6 hours PRN agitation  nicotine  Polacrilex Gum 2 milliGRAM(s) Oral every 2 hours PRN nicotine cravings  OLANZapine 5 milliGRAM(s) Oral every 6 hours PRN agitation  OLANZapine Injectable 5 milliGRAM(s) IntraMuscular daily PRN Refusing court ordered PO medication  OLANZapine Injectable 5 milliGRAM(s) IntraMuscular once PRN severe agitation  traZODone 50 milliGRAM(s) Oral once PRN insomnia   MEDICATIONS  (STANDING):  ARIPiprazole 30 milliGRAM(s) Oral daily  nicotine -  14 mG/24Hr(s) Patch 1 Patch Transdermal daily    MEDICATIONS  (PRN):  acetaminophen     Tablet .. 650 milliGRAM(s) Oral every 6 hours PRN Temp greater or equal to 38C (100.4F), Mild Pain (1 - 3)  LORazepam     Tablet 2 milliGRAM(s) Oral every 6 hours PRN agitation  nicotine  Polacrilex Gum 2 milliGRAM(s) Oral every 2 hours PRN nicotine cravings  OLANZapine 5 milliGRAM(s) Oral every 6 hours PRN agitation  OLANZapine Injectable 5 milliGRAM(s) IntraMuscular daily PRN Refusing court ordered PO medication  OLANZapine Injectable 5 milliGRAM(s) IntraMuscular once PRN severe agitation  traZODone 50 milliGRAM(s) Oral once PRN insomnia

## 2023-10-31 NOTE — BH INPATIENT PSYCHIATRY PROGRESS NOTE - PRN MEDS
MEDICATIONS  (PRN):  acetaminophen     Tablet .. 650 milliGRAM(s) Oral every 6 hours PRN Temp greater or equal to 38C (100.4F), Mild Pain (1 - 3)  LORazepam     Tablet 2 milliGRAM(s) Oral every 6 hours PRN agitation  nicotine  Polacrilex Gum 2 milliGRAM(s) Oral every 2 hours PRN nicotine cravings  OLANZapine 5 milliGRAM(s) Oral every 6 hours PRN agitation  OLANZapine Injectable 5 milliGRAM(s) IntraMuscular daily PRN Refusing court ordered PO medication  OLANZapine Injectable 5 milliGRAM(s) IntraMuscular once PRN severe agitation  traZODone 50 milliGRAM(s) Oral once PRN insomnia

## 2023-10-31 NOTE — DIETITIAN INITIAL EVALUATION ADULT - PERTINENT MEDS FT
MEDICATIONS  (STANDING):  ARIPiprazole 30 milliGRAM(s) Oral daily  nicotine -  14 mG/24Hr(s) Patch 1 Patch Transdermal daily    MEDICATIONS  (PRN):  acetaminophen     Tablet .. 650 milliGRAM(s) Oral every 6 hours PRN Temp greater or equal to 38C (100.4F), Mild Pain (1 - 3)  LORazepam     Tablet 2 milliGRAM(s) Oral every 6 hours PRN agitation  nicotine  Polacrilex Gum 2 milliGRAM(s) Oral every 2 hours PRN nicotine cravings  OLANZapine 5 milliGRAM(s) Oral every 6 hours PRN agitation  OLANZapine Injectable 5 milliGRAM(s) IntraMuscular once PRN severe agitation  OLANZapine Injectable 5 milliGRAM(s) IntraMuscular daily PRN Refusing court ordered PO medication  traZODone 50 milliGRAM(s) Oral once PRN insomnia
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
## 2023-10-31 NOTE — BH INPATIENT PSYCHIATRY PROGRESS NOTE - NSBHCHARTREVIEWVS_PSY_A_CORE FT
Vital Signs Last 24 Hrs  T(C): 36.6 (10-30-23 @ 08:32), Max: 36.6 (10-30-23 @ 08:32)  T(F): 97.8 (10-30-23 @ 08:32), Max: 97.8 (10-30-23 @ 08:32)  HR: --  BP: 110/69 (10-30-23 @ 08:32) (110/69 - 110/69)  BP(mean): 88 (10-30-23 @ 08:32) (88 - 88)  RR: --  SpO2: --    Orthostatic VS  10-29-23 @ 08:21  Lying BP: --/-- HR: --  Sitting BP: 102/57 HR: 60  Standing BP: --/-- HR: --  Site: --  Mode: --   Vital Signs Last 24 Hrs  T(C): 36.7 (10-31-23 @ 08:24), Max: 36.7 (10-31-23 @ 08:24)  T(F): 98 (10-31-23 @ 08:24), Max: 98 (10-31-23 @ 08:24)  HR: 87 (10-31-23 @ 08:24) (87 - 87)  BP: 109/70 (10-31-23 @ 08:24) (109/70 - 109/70)  BP(mean): --  RR: --  SpO2: --

## 2023-10-31 NOTE — BH INPATIENT PSYCHIATRY PROGRESS NOTE - MSE UNSTRUCTURED FT
Appearance: grooming-intact, wearing hospital attire  Behavior: guarded, mistrustful, no PMA/PMR  Speech: hyperverbal, uninterruptible at times  Mood: "good"  Affect: irritable, but less so than previous  Thought process: more organized overall but still with moments of illogical statements and slightly disorganized speech  Thought content: remains paranoid, but increasingly willing to engage with this provider, and less paranoid towards other staff  Perceptions: no AH, VH elicited, not internally pre-occupied appearing  Insight: extremely poor  Judgement: poor  Cognition: grossly intact   Gait: intact Appearance: hygiene-intact though slightly unkempt, wearing hospital attire  Behavior: guarded, mistrustful, no PMA/PMR  Speech: hyperverbal, uninterruptible at times  Mood: "fine"  Affect: irritable, angry  Thought process: more organized overall but still with moments of illogical statements and slightly disorganized speech  Thought content: remains paranoid, particularly with treating psychiatrist, but increasingly willing to engage with this provider, and less paranoid towards other staff  Perceptions: no AH, VH elicited, not internally pre-occupied appearing  Insight: extremely poor  Judgement: poor  Cognition: grossly intact   Gait: intact

## 2023-10-31 NOTE — DIETITIAN INITIAL EVALUATION ADULT - ADD RECOMMEND
Encourage healthy food choices. Monitor PO intake/tolerance, weights, labs, BM's, and skin integrity.  23

## 2023-10-31 NOTE — BH INPATIENT PSYCHIATRY PROGRESS NOTE - NSBHFUPINTERVALHXFT_PSY_A_CORE
Accepting PO medications, no PRNs for agitation, no acute events. Given Ativan 2mg for anxiety at patient's request. Accepting PO medications, no PRNs for agitation, no acute events. Given Ativan 2mg for anxiety at patient's request.    Patient seen in hallway. Initially calm and fairly pleasant with interview. States she feels fine. She reports feeling slightly more agitated ("0.2/10"), though denies any sense of restlessness, difficulty sitting still, or need to pace. Acknowledges talking to  and mother, but declines to share information about their conversations. Asks questions about discharge timeline, which at first are reasonable but then becomes irritable when this writer cannot give a specific timeline. She begins again accusing this writer of lying, and only applying for AOT after patient threatened to call AMA. Got very close to writer, pointing finger in face and speaking loudly. Nearby staff intervened, for which patient stated that particular staff member was only aligned with this writer because we are both white.

## 2023-10-31 NOTE — BH INPATIENT PSYCHIATRY PROGRESS NOTE - NSBHMETABOLIC_PSY_ALL_CORE_FT
BMI:   HbA1c: A1C with Estimated Average Glucose Result: 4.9 % (10-18-23 @ 11:45)    Glucose:   BP: 110/69 (10-30-23 @ 08:32) (110/69 - 110/69)  Lipid Panel: Date/Time: 10-18-23 @ 11:45  Cholesterol, Serum: 123  Direct LDL: --  HDL Cholesterol, Serum: 49  Total Cholesterol/HDL Ration Measurement: --  Triglycerides, Serum: 153   BMI:   HbA1c: A1C with Estimated Average Glucose Result: 4.9 % (10-18-23 @ 11:45)    Glucose:   BP: 109/70 (10-31-23 @ 08:24) (109/70 - 110/69)  Lipid Panel: Date/Time: 10-18-23 @ 11:45  Cholesterol, Serum: 123  Direct LDL: --  HDL Cholesterol, Serum: 49  Total Cholesterol/HDL Ration Measurement: --  Triglycerides, Serum: 153

## 2023-11-01 PROCEDURE — 99232 SBSQ HOSP IP/OBS MODERATE 35: CPT

## 2023-11-01 RX ADMIN — Medication 1 PATCH: at 08:16

## 2023-11-01 RX ADMIN — ARIPIPRAZOLE 30 MILLIGRAM(S): 15 TABLET ORAL at 08:16

## 2023-11-01 NOTE — BH INPATIENT PSYCHIATRY PROGRESS NOTE - NSBHCHARTREVIEWVS_PSY_A_CORE FT
Vital Signs Last 24 Hrs  T(C): --  T(F): --  HR: --  BP: --  BP(mean): --  RR: --  SpO2: --     Vital Signs Last 24 Hrs  T(C): 36.2 (11-01-23 @ 10:00), Max: 36.2 (11-01-23 @ 10:00)  T(F): 97.1 (11-01-23 @ 10:00), Max: 97.1 (11-01-23 @ 10:00)  HR: 84 (11-01-23 @ 10:00) (84 - 84)  BP: 116/74 (11-01-23 @ 10:00) (116/74 - 116/74)  BP(mean): --  RR: --  SpO2: --

## 2023-11-01 NOTE — BH INPATIENT PSYCHIATRY PROGRESS NOTE - NSBHMETABOLIC_PSY_ALL_CORE_FT
BMI:   HbA1c: A1C with Estimated Average Glucose Result: 4.9 % (10-18-23 @ 11:45)    Glucose:   BP: 109/70 (10-31-23 @ 08:24) (109/70 - 110/69)  Lipid Panel: Date/Time: 10-18-23 @ 11:45  Cholesterol, Serum: 123  Direct LDL: --  HDL Cholesterol, Serum: 49  Total Cholesterol/HDL Ration Measurement: --  Triglycerides, Serum: 153   BMI:   HbA1c: A1C with Estimated Average Glucose Result: 4.9 % (10-18-23 @ 11:45)    Glucose:   BP: 116/74 (11-01-23 @ 10:00) (109/70 - 116/74)  Lipid Panel: Date/Time: 10-18-23 @ 11:45  Cholesterol, Serum: 123  Direct LDL: --  HDL Cholesterol, Serum: 49  Total Cholesterol/HDL Ration Measurement: --  Triglycerides, Serum: 153

## 2023-11-01 NOTE — BH INPATIENT PSYCHIATRY PROGRESS NOTE - MSE UNSTRUCTURED FT
Appearance: hygiene-intact though slightly unkempt, wearing hospital attire  Behavior: guarded, mistrustful, no PMA/PMR  Speech: hyperverbal, uninterruptible at times  Mood: "fine"  Affect: irritable, angry  Thought process: more organized overall but still with moments of illogical statements and slightly disorganized speech  Thought content: remains paranoid, particularly with treating psychiatrist, but increasingly willing to engage with this provider, and less paranoid towards other staff  Perceptions: no AH, VH elicited, not internally pre-occupied appearing  Insight: extremely poor  Judgement: poor  Cognition: grossly intact   Gait: intact Appearance: hygiene-intact though slightly unkempt, wearing hospital attire  Behavior: guarded, mistrustful, no PMA/PMR  Speech: normal rate and volume  Mood: "fine"  Affect: slightly irritable  Thought process: more organized overall but still with moments of illogical statements and slightly disorganized speech  Thought content: remains paranoid, particularly with treating psychiatrist, but increasingly willing to engage with this provider, and less paranoid towards other staff  Perceptions: no AH, VH elicited, not internally pre-occupied appearing  Insight: extremely poor  Judgement: poor  Cognition: grossly intact   Gait: intact
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
## 2023-11-01 NOTE — BH INPATIENT PSYCHIATRY PROGRESS NOTE - PRN MEDS
MEDICATIONS  (PRN):  acetaminophen     Tablet .. 650 milliGRAM(s) Oral every 6 hours PRN Temp greater or equal to 38C (100.4F), Mild Pain (1 - 3)  LORazepam     Tablet 2 milliGRAM(s) Oral every 6 hours PRN agitation  nicotine  Polacrilex Gum 2 milliGRAM(s) Oral every 2 hours PRN nicotine cravings  OLANZapine 5 milliGRAM(s) Oral every 6 hours PRN agitation  OLANZapine Injectable 5 milliGRAM(s) IntraMuscular once PRN severe agitation  OLANZapine Injectable 5 milliGRAM(s) IntraMuscular daily PRN Refusing court ordered PO medication  traZODone 50 milliGRAM(s) Oral once PRN insomnia
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
## 2023-11-02 PROCEDURE — 99231 SBSQ HOSP IP/OBS SF/LOW 25: CPT

## 2023-11-02 PROCEDURE — 90832 PSYTX W PT 30 MINUTES: CPT

## 2023-11-02 RX ADMIN — ARIPIPRAZOLE 30 MILLIGRAM(S): 15 TABLET ORAL at 09:33

## 2023-11-02 RX ADMIN — Medication 1 PATCH: at 09:33
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
## 2023-11-02 NOTE — BH TREATMENT PLAN - NSTXPSYCHOINTERRN_PSY_ALL_CORE
Assess thought process daily  Monitor medication compliance and response  Maintain reality orientation daily   Maintain safe and therapeutic process  If command hallucinations present, assess for content and direction
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
## 2023-11-02 NOTE — BH INPATIENT PSYCHIATRY PROGRESS NOTE - NSBHCHARTREVIEWVS_PSY_A_CORE FT
Vital Signs Last 24 Hrs  T(C): 36.6 (11-02-23 @ 08:36), Max: 36.6 (11-02-23 @ 08:36)  T(F): 97.9 (11-02-23 @ 08:36), Max: 97.9 (11-02-23 @ 08:36)  HR: 84 (11-01-23 @ 10:00) (84 - 84)  BP: 116/74 (11-01-23 @ 10:00) (116/74 - 116/74)  BP(mean): --  RR: --  SpO2: --    Orthostatic VS  11-02-23 @ 08:36  Lying BP: --/-- HR: --  Sitting BP: 99/60 HR: --  Standing BP: --/-- HR: --  Site: --  Mode: --   Vital Signs Last 24 Hrs  T(C): 36.6 (11-02-23 @ 08:36), Max: 36.6 (11-02-23 @ 08:36)  T(F): 97.9 (11-02-23 @ 08:36), Max: 97.9 (11-02-23 @ 08:36)  HR: --  BP: --  BP(mean): --  RR: --  SpO2: --    Orthostatic VS  11-02-23 @ 08:36  Lying BP: --/-- HR: --  Sitting BP: 99/60 HR: --  Standing BP: --/-- HR: --  Site: --  Mode: --

## 2023-11-02 NOTE — BH INPATIENT PSYCHIATRY PROGRESS NOTE - NSBHATTESTBILLING_PSY_A_CORE
[id number] OBS or IP - moderate complexity OR 35-49 mins [id number] OBS or IP - low complexity OR 25-34 mins

## 2023-11-02 NOTE — BH INPATIENT PSYCHIATRY PROGRESS NOTE - MSE UNSTRUCTURED FT
Appearance: hygiene-intact though slightly unkempt, wearing hospital attire  Behavior: guarded, mistrustful, no PMA/PMR  Speech: normal rate and volume  Mood: "fine"  Affect: slightly irritable  Thought process: more organized overall but still with moments of illogical statements and slightly disorganized speech  Thought content: remains paranoid, particularly with treating psychiatrist, but increasingly willing to engage with this provider, and less paranoid towards other staff  Perceptions: no AH, VH elicited, not internally pre-occupied appearing  Insight: extremely poor  Judgement: poor  Cognition: grossly intact   Gait: intact Appearance: hygiene-intact though though slightly unkempt, casually dressed  Behavior: guarded, mistrustful, no PMA/PMR  Speech: normal rate and volume  Mood: "fine"  Affect: slightly irritable  Thought process: more organized overall than previously  Thought content: remains paranoid and accusatory, particularly with treating psychiatrist, but increasingly willing to engage with this provider, and less paranoid towards other staff  Perceptions: no AH, VH elicited, not internally pre-occupied appearing  Insight: extremely poor  Judgement: poor  Cognition: grossly intact   Gait: intact

## 2023-11-03 PROCEDURE — 99231 SBSQ HOSP IP/OBS SF/LOW 25: CPT

## 2023-11-03 PROCEDURE — 90853 GROUP PSYCHOTHERAPY: CPT

## 2023-11-03 RX ADMIN — Medication 1 PATCH: at 08:22

## 2023-11-03 RX ADMIN — Medication 1 PATCH: at 08:45

## 2023-11-03 RX ADMIN — ARIPIPRAZOLE 30 MILLIGRAM(S): 15 TABLET ORAL at 08:22

## 2023-11-03 RX ADMIN — Medication 1 PATCH: at 19:48

## 2023-11-03 RX ADMIN — Medication 1 PATCH: at 08:00
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
## 2023-11-03 NOTE — BH INPATIENT PSYCHIATRY PROGRESS NOTE - PRN MEDS
MEDICATIONS  (PRN):  acetaminophen     Tablet .. 650 milliGRAM(s) Oral every 6 hours PRN Temp greater or equal to 38C (100.4F), Mild Pain (1 - 3)  LORazepam     Tablet 2 milliGRAM(s) Oral every 6 hours PRN agitation  nicotine  Polacrilex Gum 2 milliGRAM(s) Oral every 2 hours PRN nicotine cravings  OLANZapine 5 milliGRAM(s) Oral every 6 hours PRN agitation  OLANZapine Injectable 5 milliGRAM(s) IntraMuscular once PRN severe agitation  OLANZapine Injectable 5 milliGRAM(s) IntraMuscular daily PRN Refusing court ordered PO medication  traZODone 50 milliGRAM(s) Oral once PRN insomnia   MEDICATIONS  (PRN):  acetaminophen     Tablet .. 650 milliGRAM(s) Oral every 6 hours PRN Temp greater or equal to 38C (100.4F), Mild Pain (1 - 3)  LORazepam     Tablet 2 milliGRAM(s) Oral every 6 hours PRN agitation  nicotine  Polacrilex Gum 2 milliGRAM(s) Oral every 2 hours PRN nicotine cravings  OLANZapine 5 milliGRAM(s) Oral every 6 hours PRN agitation  OLANZapine Injectable 5 milliGRAM(s) IntraMuscular daily PRN Refusing court ordered PO medication  OLANZapine Injectable 5 milliGRAM(s) IntraMuscular once PRN severe agitation  traZODone 50 milliGRAM(s) Oral once PRN insomnia

## 2023-11-03 NOTE — BH INPATIENT PSYCHIATRY PROGRESS NOTE - CURRENT MEDICATION
MEDICATIONS  (STANDING):  ARIPiprazole 30 milliGRAM(s) Oral daily  nicotine -  14 mG/24Hr(s) Patch 1 Patch Transdermal daily    MEDICATIONS  (PRN):  acetaminophen     Tablet .. 650 milliGRAM(s) Oral every 6 hours PRN Temp greater or equal to 38C (100.4F), Mild Pain (1 - 3)  LORazepam     Tablet 2 milliGRAM(s) Oral every 6 hours PRN agitation  nicotine  Polacrilex Gum 2 milliGRAM(s) Oral every 2 hours PRN nicotine cravings  OLANZapine 5 milliGRAM(s) Oral every 6 hours PRN agitation  OLANZapine Injectable 5 milliGRAM(s) IntraMuscular once PRN severe agitation  OLANZapine Injectable 5 milliGRAM(s) IntraMuscular daily PRN Refusing court ordered PO medication  traZODone 50 milliGRAM(s) Oral once PRN insomnia   MEDICATIONS  (STANDING):  ARIPiprazole 30 milliGRAM(s) Oral daily  nicotine -  14 mG/24Hr(s) Patch 1 Patch Transdermal daily    MEDICATIONS  (PRN):  acetaminophen     Tablet .. 650 milliGRAM(s) Oral every 6 hours PRN Temp greater or equal to 38C (100.4F), Mild Pain (1 - 3)  LORazepam     Tablet 2 milliGRAM(s) Oral every 6 hours PRN agitation  nicotine  Polacrilex Gum 2 milliGRAM(s) Oral every 2 hours PRN nicotine cravings  OLANZapine 5 milliGRAM(s) Oral every 6 hours PRN agitation  OLANZapine Injectable 5 milliGRAM(s) IntraMuscular daily PRN Refusing court ordered PO medication  OLANZapine Injectable 5 milliGRAM(s) IntraMuscular once PRN severe agitation  traZODone 50 milliGRAM(s) Oral once PRN insomnia

## 2023-11-03 NOTE — BH INPATIENT PSYCHIATRY PROGRESS NOTE - NSBHMETABOLIC_PSY_ALL_CORE_FT
BMI:   HbA1c: A1C with Estimated Average Glucose Result: 4.9 % (10-18-23 @ 11:45)    Glucose:   BP: 116/74 (11-01-23 @ 10:00) (116/74 - 116/74)  Lipid Panel: Date/Time: 10-18-23 @ 11:45  Cholesterol, Serum: 123  Direct LDL: --  HDL Cholesterol, Serum: 49  Total Cholesterol/HDL Ration Measurement: --  Triglycerides, Serum: 153

## 2023-11-03 NOTE — BH INPATIENT PSYCHIATRY PROGRESS NOTE - NSBHFUPINTERVALHXFT_PSY_A_CORE
VSS, accepting medications, no PRNs for agitation, no acute events.    VSS, accepting medications, no PRNs for agitation, no acute events.     Patient seen in room, and amenable to speaking in hallway. Begin by asking patient how she is feeling ("fine") and how she slept. Patient becomes extremely irritable with this second question, and state that this writer is trying to suggest that she can't sleep despite her only experiencing a few bad nights of sleep. Attempt to explain to patient that this is a standard question, and not an statement about her being ill, and asks why this question makes her so angry. Patient then becomes irate, hyperverbal, unable to be reasoned with, and begins walking around the unit, telling various staff members about this interaction, and demanding that we only see each other with two other staff present.

## 2023-11-03 NOTE — BH INPATIENT PSYCHIATRY PROGRESS NOTE - NSBHCHARTREVIEWVS_PSY_A_CORE FT
Vital Signs Last 24 Hrs  T(C): --  T(F): --  HR: --  BP: --  BP(mean): --  RR: --  SpO2: --    Orthostatic VS  11-02-23 @ 08:36  Lying BP: --/-- HR: --  Sitting BP: 99/60 HR: --  Standing BP: --/-- HR: --  Site: --  Mode: --

## 2023-11-03 NOTE — BH INPATIENT PSYCHIATRY PROGRESS NOTE - MSE UNSTRUCTURED FT
Appearance: hygiene-intact though though slightly unkempt, casually dressed  Behavior: guarded, mistrustful, no PMA/PMR  Speech: normal rate and volume  Mood: "fine"  Affect: slightly irritable  Thought process: more organized overall than previously  Thought content: remains paranoid and accusatory, particularly with treating psychiatrist, but increasingly willing to engage with this provider, and less paranoid towards other staff  Perceptions: no AH, VH elicited, not internally pre-occupied appearing  Insight: extremely poor  Judgement: poor  Cognition: grossly intact   Gait: intact Appearance: hygiene-intact though slightly unkempt, casually dressed  Behavior: guarded, mistrustful, hostile, no PMA/PMR  Speech: hyperverbal, difficult to interrupt, loud at times  Mood: "fine"  Affect: irritable and angry  Thought process: illogical  Thought content: remains paranoid and accusatory, particularly with treating psychiatrist  Perceptions: no AH, VH elicited, not internally pre-occupied appearing  Insight: extremely poor  Judgement: poor  Cognition: grossly intact   Gait: intact

## 2023-11-04 RX ADMIN — Medication 1 PATCH: at 07:29

## 2023-11-04 RX ADMIN — ARIPIPRAZOLE 30 MILLIGRAM(S): 15 TABLET ORAL at 08:16

## 2023-11-04 RX ADMIN — Medication 1 PATCH: at 08:16

## 2023-11-04 RX ADMIN — Medication 1 PATCH: at 19:53

## 2023-11-05 RX ADMIN — Medication 1 PATCH: at 09:20

## 2023-11-05 RX ADMIN — Medication 2 MILLIGRAM(S): at 20:39

## 2023-11-05 RX ADMIN — ARIPIPRAZOLE 30 MILLIGRAM(S): 15 TABLET ORAL at 08:12

## 2023-11-05 RX ADMIN — Medication 1 PATCH: at 08:20

## 2023-11-05 RX ADMIN — Medication 1 PATCH: at 08:14

## 2023-11-06 PROCEDURE — 90853 GROUP PSYCHOTHERAPY: CPT

## 2023-11-06 PROCEDURE — 99231 SBSQ HOSP IP/OBS SF/LOW 25: CPT

## 2023-11-06 RX ORDER — PALIPERIDONE 1.5 MG/1
3 TABLET, EXTENDED RELEASE ORAL DAILY
Refills: 0 | Status: DISCONTINUED | OUTPATIENT
Start: 2023-11-06 | End: 2023-11-06

## 2023-11-06 RX ORDER — PALIPERIDONE 1.5 MG/1
3 TABLET, EXTENDED RELEASE ORAL DAILY
Refills: 0 | Status: DISCONTINUED | OUTPATIENT
Start: 2023-11-06 | End: 2023-11-07

## 2023-11-06 RX ORDER — ARIPIPRAZOLE 15 MG/1
20 TABLET ORAL DAILY
Refills: 0 | Status: DISCONTINUED | OUTPATIENT
Start: 2023-11-06 | End: 2023-11-07

## 2023-11-06 RX ORDER — ARIPIPRAZOLE 15 MG/1
20 TABLET ORAL DAILY
Refills: 0 | Status: DISCONTINUED | OUTPATIENT
Start: 2023-11-06 | End: 2023-11-06

## 2023-11-06 RX ADMIN — Medication 2 MILLIGRAM(S): at 07:44

## 2023-11-06 RX ADMIN — Medication 1 PATCH: at 07:18

## 2023-11-06 RX ADMIN — Medication 2 MILLIGRAM(S): at 16:05

## 2023-11-06 RX ADMIN — ARIPIPRAZOLE 30 MILLIGRAM(S): 15 TABLET ORAL at 08:06

## 2023-11-06 RX ADMIN — Medication 1 PATCH: at 07:20

## 2023-11-06 RX ADMIN — Medication 1 PATCH: at 08:06

## 2023-11-06 RX ADMIN — Medication 2 MILLIGRAM(S): at 18:47

## 2023-11-06 NOTE — BH INPATIENT PSYCHIATRY PROGRESS NOTE - NSBHFUPINTERVALHXFT_PSY_A_CORE
VSS, accepting medications, no PRNs for agitation, no acute events over weekend VSS, accepting medications, no PRNs for agitation, no acute events over weekend. Slept well on Friday and Saturday nights, but only 3-4 hours last night.    Patient seen together with DARRIUS and team RN. Initially calm, guarded, stating she is feeling fine and denying any issues or distress. Similar to previous encounters, after a few questions, becomes verbally agitated, pointing fingers at this writer, calling this writer a fabricator and psychopath. She is loud, hyperverbal and accusatory, repeatedly interrupting members of the team who try to speak. Asserts that she is not sick at all and doesn't need any medications. Concerns from team explained, as well as plan to change medication. Suggest risperidone, but patient states she has been on it in the past ("for disassociation") and she didn't like the way it made her feel, though can't provide more specifics. Amenable to paliperidone. Informed that this will start tomorrow. VSS, accepting medications, no PRNs for agitation, no acute events over weekend. Slept well on Friday and Saturday nights, but only 3-4 hours last night.    Patient seen together with DARRIUS and team RN. Initially calm, guarded, stating she is feeling fine and denying any issues or distress. Similar to previous encounters, after a few questions, becomes verbally agitated, pointing fingers at this writer, calling this writer a fabricator and psychopath. She is loud, hyperverbal and accusatory, repeatedly interrupting members of the team who try to speak. Asserts that she is not sick at all and doesn't need any medications. Curses at this writer. Concerns from team explained, as well as plan to change medication. Suggest risperidone, but patient states she has been on it in the past ("for disassociation") and she didn't like the way it made her feel, though can't provide more specifics. Amenable to paliperidone. Informed that this will start tomorrow.
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
## 2023-11-06 NOTE — BH INPATIENT PSYCHIATRY PROGRESS NOTE - MSE UNSTRUCTURED FT
Appearance: hygiene-intact though slightly unkempt, casually dressed  Behavior: guarded, mistrustful, hostile, no PMA/PMR  Speech: hyperverbal, difficult to interrupt, loud at times  Mood: "fine"  Affect: irritable and angry  Thought process: illogical  Thought content: remains paranoid and accusatory, particularly with treating psychiatrist  Perceptions: no AH, VH elicited, not internally pre-occupied appearing  Insight: extremely poor  Judgement: poor  Cognition: grossly intact   Gait: intact Appearance: hygiene-intact though slightly unkempt, casually dressed  Behavior: guarded, mistrustful, hostile, no PMA/PMR, points fingers at team members and gets physically close  Speech: loud, hyperverbal, accusatory, difficult to interrupt  Mood: "fine"  Affect: irritable and angry  Thought process: illogical, mildly disorganized  Thought content: remains paranoid and accusatory, particularly with treating psychiatrist  Perceptions: no AH, VH elicited, not internally pre-occupied appearing  Insight: extremely poor  Judgement: poor  Cognition: grossly intact   Gait: intact Appearance: hygiene-intact though slightly unkempt, casually dressed  Behavior: guarded, mistrustful, hostile, no PMA/PMR, points fingers at team members, curses, and gets physically close  Speech: loud, hyperverbal, accusatory, difficult to interrupt  Mood: "fine"  Affect: irritable and angry  Thought process: illogical, mildly disorganized  Thought content: remains paranoid and accusatory, particularly with treating psychiatrist  Perceptions: no AH, VH elicited, not internally pre-occupied appearing  Insight: extremely poor  Judgement: poor  Cognition: grossly intact   Gait: intact

## 2023-11-06 NOTE — BH INPATIENT PSYCHIATRY PROGRESS NOTE - NSBHCHARTREVIEWVS_PSY_A_CORE FT
Vital Signs Last 24 Hrs  T(C): 36.3 (11-06-23 @ 08:40), Max: 36.3 (11-06-23 @ 08:40)  T(F): 97.4 (11-06-23 @ 08:40), Max: 97.4 (11-06-23 @ 08:40)  HR: 78 (11-06-23 @ 08:40) (78 - 78)  BP: 120/86 (11-06-23 @ 08:40) (120/86 - 120/86)  BP(mean): --  RR: --  SpO2: --    Orthostatic VS  11-05-23 @ 08:32  Lying BP: --/-- HR: --  Sitting BP: 101/67 HR: 76  Standing BP: --/-- HR: --  Site: --  Mode: --

## 2023-11-06 NOTE — BH INPATIENT PSYCHIATRY PROGRESS NOTE - CURRENT MEDICATION
MEDICATIONS  (STANDING):  ARIPiprazole 30 milliGRAM(s) Oral daily  nicotine -  14 mG/24Hr(s) Patch 1 Patch Transdermal daily    MEDICATIONS  (PRN):  acetaminophen     Tablet .. 650 milliGRAM(s) Oral every 6 hours PRN Temp greater or equal to 38C (100.4F), Mild Pain (1 - 3)  LORazepam     Tablet 2 milliGRAM(s) Oral every 6 hours PRN agitation  nicotine  Polacrilex Gum 2 milliGRAM(s) Oral every 2 hours PRN nicotine cravings  OLANZapine 5 milliGRAM(s) Oral every 6 hours PRN agitation  OLANZapine Injectable 5 milliGRAM(s) IntraMuscular once PRN severe agitation  OLANZapine Injectable 5 milliGRAM(s) IntraMuscular daily PRN Refusing court ordered PO medication  traZODone 50 milliGRAM(s) Oral once PRN insomnia   MEDICATIONS  (STANDING):  ARIPiprazole 20 milliGRAM(s) Oral daily  nicotine -  14 mG/24Hr(s) Patch 1 Patch Transdermal daily  paliperidone ER 3 milliGRAM(s) Oral daily    MEDICATIONS  (PRN):  acetaminophen     Tablet .. 650 milliGRAM(s) Oral every 6 hours PRN Temp greater or equal to 38C (100.4F), Mild Pain (1 - 3)  LORazepam     Tablet 2 milliGRAM(s) Oral every 6 hours PRN agitation  nicotine  Polacrilex Gum 2 milliGRAM(s) Oral every 2 hours PRN nicotine cravings  OLANZapine 5 milliGRAM(s) Oral every 6 hours PRN agitation  OLANZapine Injectable 5 milliGRAM(s) IntraMuscular once PRN severe agitation  OLANZapine Injectable 5 milliGRAM(s) IntraMuscular daily PRN Refusing court ordered PO medication  traZODone 50 milliGRAM(s) Oral once PRN insomnia

## 2023-11-06 NOTE — BH INPATIENT PSYCHIATRY PROGRESS NOTE - NSBHMETABOLIC_PSY_ALL_CORE_FT
BMI:   HbA1c: A1C with Estimated Average Glucose Result: 4.9 % (10-18-23 @ 11:45)    Glucose:   BP: 120/86 (11-06-23 @ 08:40) (120/86 - 122/77)  Lipid Panel: Date/Time: 10-18-23 @ 11:45  Cholesterol, Serum: 123  Direct LDL: --  HDL Cholesterol, Serum: 49  Total Cholesterol/HDL Ration Measurement: --  Triglycerides, Serum: 153

## 2023-11-07 PROCEDURE — 99232 SBSQ HOSP IP/OBS MODERATE 35: CPT

## 2023-11-07 RX ORDER — ARIPIPRAZOLE 15 MG/1
10 TABLET ORAL DAILY
Refills: 0 | Status: DISCONTINUED | OUTPATIENT
Start: 2023-11-07 | End: 2023-11-09

## 2023-11-07 RX ORDER — PALIPERIDONE 1.5 MG/1
6 TABLET, EXTENDED RELEASE ORAL DAILY
Refills: 0 | Status: DISCONTINUED | OUTPATIENT
Start: 2023-11-07 | End: 2023-11-09

## 2023-11-07 RX ADMIN — Medication 2 MILLIGRAM(S): at 17:20

## 2023-11-07 RX ADMIN — ARIPIPRAZOLE 20 MILLIGRAM(S): 15 TABLET ORAL at 09:50

## 2023-11-07 RX ADMIN — PALIPERIDONE 3 MILLIGRAM(S): 1.5 TABLET, EXTENDED RELEASE ORAL at 09:50

## 2023-11-07 RX ADMIN — Medication 2 MILLIGRAM(S): at 13:14

## 2023-11-07 RX ADMIN — Medication 1 PATCH: at 09:51

## 2023-11-07 NOTE — BH INPATIENT PSYCHIATRY PROGRESS NOTE - MSE UNSTRUCTURED FT
Appearance: hygiene-intact though slightly unkempt, casually dressed  Behavior: guarded, mistrustful, hostile, no PMA/PMR, points fingers at team members, curses, and gets physically close  Speech: loud, hyperverbal, accusatory, difficult to interrupt  Mood: "fine"  Affect: irritable and angry  Thought process: illogical, mildly disorganized  Thought content: remains paranoid and accusatory, particularly with treating psychiatrist  Perceptions: no AH, VH elicited, not internally pre-occupied appearing  Insight: extremely poor  Judgement: poor  Cognition: grossly intact   Gait: intact Appearance: hygiene-intact though slightly poor grooming, casually dressed  Behavior: guarded, mistrustful, no PMA/PMR, less hostility  Speech: normal volume, can be hyperverbal and difficult to interrupt  Mood: "fine"  Affect: irritable   Thought process: illogical, largely organized and linear  Thought content: remains paranoid and accusatory, particularly with treating psychiatrist  Perceptions: no AH, VH elicited, not internally pre-occupied appearing  Insight: extremely poor  Judgement: poor  Cognition: grossly intact   Gait: intact

## 2023-11-07 NOTE — BH INPATIENT PSYCHIATRY PROGRESS NOTE - NSBHFUPINTERVALHXFT_PSY_A_CORE
VSS, accepting medications, no PRNs for agitation, no acute events.  VSS, accepting medications, no PRNs for agitation, no acute events.     Patient seen in hallway with team RN and SW. Patient is calmer than previous, and able to engage in more of interview. Reports feeling physically well, denies muscle stiffness, tremor or restlessness. Discuss concerns regarding medication change, and assure patient we will monitor for side effects regularly. Patient continues to state she was sent to the hospital because neighbors misinterpreted verbal argument with . States neighbors are racist. States during hospitalization before this, her 's bosses threatened him to bring her in. At end of interview, becomes irritable again and brings up feelings that this writer lied in court. Able to be redirected.

## 2023-11-07 NOTE — BH INPATIENT PSYCHIATRY PROGRESS NOTE - NSBHATTESTBILLING_PSY_A_CORE
[id number] OBS or IP - low complexity OR 25-34 mins [id number] OBS or IP - moderate complexity OR 35-49 mins

## 2023-11-07 NOTE — BH INPATIENT PSYCHIATRY PROGRESS NOTE - NSBHCHARTREVIEWVS_PSY_A_CORE FT
Vital Signs Last 24 Hrs  T(C): 36.5 (11-07-23 @ 07:51), Max: 36.5 (11-07-23 @ 07:51)  T(F): 97.7 (11-07-23 @ 07:51), Max: 97.7 (11-07-23 @ 07:51)  HR: --  BP: --  BP(mean): --  RR: --  SpO2: --    Orthostatic VS  11-07-23 @ 07:51  Lying BP: --/-- HR: --  Sitting BP: 111/69 HR: 87  Standing BP: --/-- HR: --  Site: --  Mode: --
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
## 2023-11-07 NOTE — BH INPATIENT PSYCHIATRY PROGRESS NOTE - NSBHMETABOLIC_PSY_ALL_CORE_FT
BMI:   HbA1c: A1C with Estimated Average Glucose Result: 4.9 % (10-18-23 @ 11:45)    Glucose:   BP: 120/86 (11-06-23 @ 08:40) (120/86 - 120/86)  Lipid Panel: Date/Time: 10-18-23 @ 11:45  Cholesterol, Serum: 123  Direct LDL: --  HDL Cholesterol, Serum: 49  Total Cholesterol/HDL Ration Measurement: --  Triglycerides, Serum: 153

## 2023-11-07 NOTE — BH INPATIENT PSYCHIATRY PROGRESS NOTE - CURRENT MEDICATION
MEDICATIONS  (STANDING):  ARIPiprazole 20 milliGRAM(s) Oral daily  nicotine -  14 mG/24Hr(s) Patch 1 Patch Transdermal daily  paliperidone ER 3 milliGRAM(s) Oral daily    MEDICATIONS  (PRN):  acetaminophen     Tablet .. 650 milliGRAM(s) Oral every 6 hours PRN Temp greater or equal to 38C (100.4F), Mild Pain (1 - 3)  LORazepam     Tablet 2 milliGRAM(s) Oral every 6 hours PRN agitation  nicotine  Polacrilex Gum 2 milliGRAM(s) Oral every 2 hours PRN nicotine cravings  OLANZapine 5 milliGRAM(s) Oral every 6 hours PRN agitation  OLANZapine Injectable 5 milliGRAM(s) IntraMuscular once PRN severe agitation  OLANZapine Injectable 5 milliGRAM(s) IntraMuscular daily PRN Refusing court ordered PO medication  traZODone 50 milliGRAM(s) Oral once PRN insomnia   MEDICATIONS  (STANDING):  ARIPiprazole 10 milliGRAM(s) Oral daily  nicotine -  14 mG/24Hr(s) Patch 1 Patch Transdermal daily  paliperidone ER 6 milliGRAM(s) Oral daily    MEDICATIONS  (PRN):  acetaminophen     Tablet .. 650 milliGRAM(s) Oral every 6 hours PRN Temp greater or equal to 38C (100.4F), Mild Pain (1 - 3)  LORazepam     Tablet 2 milliGRAM(s) Oral every 6 hours PRN agitation  nicotine  Polacrilex Gum 2 milliGRAM(s) Oral every 2 hours PRN nicotine cravings  OLANZapine 5 milliGRAM(s) Oral every 6 hours PRN agitation  OLANZapine Injectable 5 milliGRAM(s) IntraMuscular once PRN severe agitation  OLANZapine Injectable 5 milliGRAM(s) IntraMuscular daily PRN Refusing court ordered PO medication  traZODone 50 milliGRAM(s) Oral once PRN insomnia

## 2023-11-08 PROCEDURE — 99232 SBSQ HOSP IP/OBS MODERATE 35: CPT

## 2023-11-08 PROCEDURE — 90853 GROUP PSYCHOTHERAPY: CPT

## 2023-11-08 RX ADMIN — ARIPIPRAZOLE 10 MILLIGRAM(S): 15 TABLET ORAL at 08:57

## 2023-11-08 RX ADMIN — Medication 1 PATCH: at 08:56

## 2023-11-08 RX ADMIN — PALIPERIDONE 6 MILLIGRAM(S): 1.5 TABLET, EXTENDED RELEASE ORAL at 08:57

## 2023-11-08 RX ADMIN — Medication 2 MILLIGRAM(S): at 11:24

## 2023-11-08 RX ADMIN — Medication 2 MILLIGRAM(S): at 22:40

## 2023-11-08 RX ADMIN — Medication 2 MILLIGRAM(S): at 14:52

## 2023-11-08 NOTE — BH INPATIENT PSYCHIATRY PROGRESS NOTE - PRN MEDS
MEDICATIONS  (PRN):  acetaminophen     Tablet .. 650 milliGRAM(s) Oral every 6 hours PRN Temp greater or equal to 38C (100.4F), Mild Pain (1 - 3)  LORazepam     Tablet 2 milliGRAM(s) Oral every 6 hours PRN agitation  nicotine  Polacrilex Gum 2 milliGRAM(s) Oral every 2 hours PRN nicotine cravings  OLANZapine 5 milliGRAM(s) Oral every 6 hours PRN agitation  OLANZapine Injectable 5 milliGRAM(s) IntraMuscular daily PRN Refusing court ordered PO medication  OLANZapine Injectable 5 milliGRAM(s) IntraMuscular once PRN severe agitation  traZODone 50 milliGRAM(s) Oral once PRN insomnia   MEDICATIONS  (PRN):  acetaminophen     Tablet .. 650 milliGRAM(s) Oral every 6 hours PRN Temp greater or equal to 38C (100.4F), Mild Pain (1 - 3)  LORazepam     Tablet 2 milliGRAM(s) Oral every 6 hours PRN agitation  nicotine  Polacrilex Gum 2 milliGRAM(s) Oral every 2 hours PRN nicotine cravings  OLANZapine 5 milliGRAM(s) Oral every 6 hours PRN agitation  OLANZapine Injectable 5 milliGRAM(s) IntraMuscular once PRN severe agitation  OLANZapine Injectable 5 milliGRAM(s) IntraMuscular daily PRN Refusing court ordered PO medication  traZODone 50 milliGRAM(s) Oral once PRN insomnia

## 2023-11-08 NOTE — BH INPATIENT PSYCHIATRY PROGRESS NOTE - NSBHCONTPROVIDER_PSY_ALL_CORE
2021       RE: Sandra Jara  [address]  [place] [address]     Dear Colleague,    Thank you for referring your patient, Sandra Jara, to the [facility] [place] at [facility]. Please see a copy of my visit note below.    Department of Neurology  Movement Disorders Division   DBS Follow-up Note  Botulinum Injection Note    Patient: Sandra Jara  MRN: [id number]   : 1965   Date of Visit: 2021    Diagnosis: CBS  DBS Target(s): L GPi   Date(s) of DBS Lead Placement:     Date(s) of IPG Placement:  Device: Abbott    Chief Complaint:  Sandra Jara is a 55 year old female who returns to clinic for follow up of CBS status post left GPi DBS and botulinum toxin injections of the RUE and RLE for the treatment of dystonia.     Became acutely lethargic and dysarthric and the battery was found to be .   Didn't notice a dramatic change with the new battery.  The lethargy improved but the dysarthria continues to worsen.    Noticed pain relief with the injections in the RLE.  No change in the turning in.  Today she feels that her neck pain is most bothersome.    Response to Last Injection: 2020  Onset of effect:  1 week  Benefit from last injections: Some relief in the neck and RUE.  Wearing off: She noticed wearing off a few weeks ago.  Side effects: She reports none.    Additional interval history: none      Review of Systems:  Other than that noted at the end of this note, the remainder of 12 systems reviewed were negative.    Medications:  Current Outpatient Medications   Medication Sig Dispense Refill     Cholecalciferol (VITAMIN D) 2000 UNITS tablet Take 2,000 Units by mouth every morning        tiZANidine (ZANAFLEX) 4 MG tablet Take 1/2-1 tab po q6h prn for muscle spasms and pain (Patient taking differently: as needed Take 1/2-1 tab po q6h prn for muscle spasms and pain)  90 tablet 3     botulinum toxin type A (BOTOX) 100 units injection Inject 280 Units into the muscle once Lot # /[id number] with Expiration Date:  11/2020       calcium carbonate 500 mg, elemental, (OSCAL 500) 1250 (500 Ca) MG TABS tablet Take by mouth every morning       OnabotulinumtoxinA (BOTOX IJ) Inject 300 Units as directed once [id number] with Expiration Date:  12/2020       traMADol (ULTRAM) 50 MG tablet Take 1/2 to 1 tab by mouth every 6 hours as needed, up to 3 tabs per day. (Patient not taking: Reported on 1/28/2021) 90 tablet 0        Relevant Medications   Tizanidine 4mg PRN            Tizanidine - takes about 2 0.5 tabs per week    Allergies: has No Known Allergies.    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 It demonstrated some mild changes of right carpal tunnel syndrome but was otherwise normal.       History of MRI of brain and brain stem 12/21/2014    A brain MRI scan done on 06/27/2014 revealed no abnormalities.      History of MRI of cervical spine 12/21/2014    A cervical MRI done on 06/10/2014 revealed some mild to moderate degenerative changes but no significant central canal or foraminal stenosis, and no abnormalities of the spinal cord were noted.       Movement disorder 12/21/2014    I saw your patient, Deepali Contreras on 12/15/2014. She is a 49-year-old right-handed female here for evaluation of right upper extremity dysfunction and right hand tremor.  She has noted this problem for the last couple of years. She reports she is losing  dexterity in her right hand. She has appreciated a tremor of the right hand with actions such as writing or postural maintenance. She has n       Past Surgical History:  Past Surgical History:   Procedure Laterality Date     APPENDECTOMY       IMPLANT DEEP BRAIN STIMULATION GENERATOR / BATTERY Left 3/1/2019    Procedure: Left Deep Brain Stimulator Placement, Phase II, Placement Of Deep Brain Stimulator Generator/Battery Over The Left Chest Wall Latex Free;  Surgeon: Efren Triana MD;  Location: [facility] OR     OPTICAL TRACKING SYSTEM INSERTION DEEP BRAIN STIMULATION Left 2/22/2019    Procedure: Stealth Assisted Left Side Deep Brain Stimulator Placement, Phase I, Placement Of Left Side Deep Brain Stimulator Electrode, Target Left Globus Pallidus Internus With Microelectrode Recording;  Surgeon: Efren Triana MD;  Location: [facility] OR     REPLACE DEEP BRAIN STIMULATION GENERATOR / BATTERY Left 11/24/2020    Procedure: Replacement of deep brain stimulator generator/battery over left chest wall;  Surgeon: Efren Triana MD;  Location: [facility] OR       Social History:  Social History     Socioeconomic History     Marital status:      Spouse name: Not on file     Number of children: 3     Years of education: Not on file     Highest education level: Not on file   Occupational History     Not on file   Social Needs     Financial resource strain: Not on file     Food insecurity     Worry: Not on file     Inability: Not on file     Transportation needs     Medical: Not on file     Non-medical: Not on file   Tobacco Use     Smoking status: Never Smoker     Smokeless tobacco: Never Used   Substance and Sexual Activity     Alcohol use: Yes     Alcohol/week: 2.0 - 3.0 standard drinks     Frequency: 2-4 times a month     Drinks per session: 1 or 2     Drug use: No     Sexual activity: Yes     Partners: Male     Birth control/protection: Male Surgical   Lifestyle     Physical activity     Days per week: Not on  file     Minutes per session: Not on file     Stress: Not on file   Relationships     Social connections     Talks on phone: Not on file     Gets together: Not on file     Attends [employer] service: Not on file     Active member of club or organization: Not on file     Attends meetings of clubs or organizations: Not on file     Relationship status: Not on file     Intimate partner violence     Fear of current or ex partner: Not on file     Emotionally abused: Not on file     Physically abused: Not on file     Forced sexual activity: Not on file   Other Topics Concern     Parent/sibling w/ CABG, MI or angioplasty before 65F 55M? No   Social History Narrative        Izaiah Jara    Lives in [facility]      She does not smoke.  She has a couple of beers to drink on the weekend but otherwise is not a heavy user of alcohol    3 kids: son peña 21; 94, 96 and 98    2 sons    1 daughter    2 are in college and daughter is a sophomore.            FAMILY HISTORY:  Notable for a paternal aunt who is .  She had Parkinson's disease.  Otherwise, there is no other family history of neurologic problems.  There is no family history of dystonia.          90% French    Some norweigian                .         Family History:  Family History   Problem Relation Age of Onset     Breast Cancer Mother      Cancer - colorectal Mother      Macular Degeneration Mother      Dementia Father      Deep Vein Thrombosis Father      No Known Problems Brother      No Known Problems Sister      No Known Problems Sister      No Known Problems Sister      Neurologic Disorder Paternal Aunt         Parkinson's Disease     Cerebrovascular Disease Brother         stroke at age 25 possibly from pfo     Dementia Paternal Grandmother        Physical Exam:  The patient's  blood pressure is 122/75 and her pulse is 80. Her respiration is 16 and oxygen saturation is 98%.      Neurological Examination:   Moderate to severe  dysarthria, spontaneous myoclonic jerks of RUE, stimulus induced myoclonic jerks of LUE, wheelchair bound, dystonia of RUE resulting in clenched fist with alternating flexion/extension of wrist, inversion of right foot, hypertrophy of right tibialis anterior, maceration of palm of right hand      Procedure: DBS Interrogation & Programming    Lead(s):     Left   DBS Target GPi      DBS Lead Type    Abbott 0.5mm spacing    Lead Implant Date   19      IPG(s):    1   IPG Infinity 5   IPG Implant Date 2020   Location    L chest   Battery (V) 2.91V     Full Impedence/Currents Check: No issues identified.  Program impedance: 1. 887 Ohms; 2. 1062 Ohms    Initial Settings:  Left Brain Program 1       GPi1 GPi2   Contacts C+;3(ABC)-4- C+;2(ABC)-   Amplitude (mA) 4.15 3.5   Pulse Width ( s) 150 90   Frequency (Hz) 125 125     Initial program selected: Program 1    Final Settings:  Left Brain Program 1       GPi1 GPi2   Contacts C+;3(ABC)-4- C+;2(ABC)-   Amplitude (mA) 4.15 [4-5.4 by 0.05] 3.5 [3.5-4.5]   Pulse Width ( s) 150 90   Frequency (Hz) 125 125       Final Program selected:  Program 1         BOTULINUM NEUROTOXIN INJECTION PROCEDURES:    VERIFICATION OF PATIENT IDENTIFICATION AND PROCEDURE     Initials   Patient Name acc   Patient  acc   Procedure Verified by: [facility]     Above assessments performed by:  Resident/Fellow         China Chen MD          The attending provider was present for the entire procedure documented below.      Tiffany Hopkins MD      INDICATION/S FOR PROCEDURE/S:  Sandra Jara is a 55 year old year old patient with dystonia affecting the  head, neck and shoulder girdle musculature, right upper extremity and right lower extremity secondary to a diagnosis of CBS with associated  pain, tremor, spasms, loss of joint motion, loss of volitional motor control and difficulty with activities of daily living.     Her baseline symptoms have been recalcitrant to oral medications  and conservative therapy.  She is here today for an injection of Botox.      GOAL OF PROCEDURE:  The goal of this procedure is to increase active range of motion, improve volitional motor control, decrease pain  and enhance functional independence associated with dystonic movements.    TOTAL DOSE ADMINISTERED:  Dose Administered:  585 units Botox    Diluent Used:  Preservative Free Normal Saline  Total Volume of Diluent Used:  6 ml  Lot # [id number] with Expiration Date:  9/2023  NDC #: Botox 200u (2952-9841-64)    CONSENT:  The risks, benefits, and treatment options were discussed with Sandra Jara and she agreed to proceed.      Written consent was obtained by acc.     EQUIPMENT USED:  Needle-37mm stimulating/recording  EMG/NCS Machine    SKIN PREPARATION:  Skin preparation was performed using an alcohol wipe.    GUIDANCE DESCRIPTION:  Electro-myographic guidance was necessary throughout the procedure to accurately identify all areas of dystonic muscles while avoiding injection of non-dystonic muscles, neighboring nerves and nearby vascular structures.     AREA/MUSCLE INJECTED:      Visual display of locations injections are scanned into the chart under MEDIA tab.    Muscles Injected Units Injected Number of Injections   Right splenius capitus 45 2   Right trapezius 65 3   Right SCM 35 2   Right cervical paraspinals 20 1   Right biceps 40 2   Right deltoid 20 1   Right brachioradialis 25 1   Right ECR 15 1   Right ECu 15 1   Right FCU 20 1   Right tibialis anterior 140 7   Right flexor digitorum longus 40 1   Right thoracic paraspinals 55 3   Left trapzius 50 3        Total Units Injected: 585    Unavoidable Waste: 15    Total Units Billed 600      The patient tolerated the injections without difficulty.      Impression/Recommendation:  Sandra Jara is a 55 year old female with CBS status post left GPi DBS and botulinum toxin injections of the neck, RUE and RLE for the treatment of dystonia  (cervical dystonia, right arm, and right leg dystonia).  Today we did repeat botulinum toxin injections.  Will also order home health care to address her dysarthria, right palm wound, and explore assistive devices for her right hand and communication.    Ms. Jara is home-bound due to her corticobasal syndrome.  She is non-ambulatory and leaving home causes much hardship.    - Follow-up in 3 months' time to consider repeat injections  - OT, SLP, and wound RN in home referrals      China Chen MD  Movement Disorders Fellow    Neurology Attending Attestation:     I, Tiffany Hopkins MD, personally saw this patient with our Movement Disorders Fellow and agree with the fellow's findings and plan of care as documented in the movement disorder fellow's note. I personally performed salient aspects of the history and neurological examination.     I personally reviewed the vital signs, medications, and labs. I personally viewed the imaging, and agree with the interpretation documented by the fellow.    I personally performed or supervised all procedures.    55minutes spent on the date of the encounter doing chart review, history and exam, documentation and further activities as noted above    Additional time spent for separate DBS programming: analyzed without reprogramming.     Tiffany Hopkins MD    of Neurology              Not applicable
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
## 2023-11-08 NOTE — BH INPATIENT PSYCHIATRY PROGRESS NOTE - CURRENT MEDICATION
MEDICATIONS  (STANDING):  ARIPiprazole 10 milliGRAM(s) Oral daily  nicotine -  14 mG/24Hr(s) Patch 1 Patch Transdermal daily  paliperidone ER 6 milliGRAM(s) Oral daily    MEDICATIONS  (PRN):  acetaminophen     Tablet .. 650 milliGRAM(s) Oral every 6 hours PRN Temp greater or equal to 38C (100.4F), Mild Pain (1 - 3)  LORazepam     Tablet 2 milliGRAM(s) Oral every 6 hours PRN agitation  nicotine  Polacrilex Gum 2 milliGRAM(s) Oral every 2 hours PRN nicotine cravings  OLANZapine 5 milliGRAM(s) Oral every 6 hours PRN agitation  OLANZapine Injectable 5 milliGRAM(s) IntraMuscular daily PRN Refusing court ordered PO medication  OLANZapine Injectable 5 milliGRAM(s) IntraMuscular once PRN severe agitation  traZODone 50 milliGRAM(s) Oral once PRN insomnia   MEDICATIONS  (STANDING):  ARIPiprazole 10 milliGRAM(s) Oral daily  nicotine -  14 mG/24Hr(s) Patch 1 Patch Transdermal daily  paliperidone ER 6 milliGRAM(s) Oral daily    MEDICATIONS  (PRN):  acetaminophen     Tablet .. 650 milliGRAM(s) Oral every 6 hours PRN Temp greater or equal to 38C (100.4F), Mild Pain (1 - 3)  LORazepam     Tablet 2 milliGRAM(s) Oral every 6 hours PRN agitation  nicotine  Polacrilex Gum 2 milliGRAM(s) Oral every 2 hours PRN nicotine cravings  OLANZapine 5 milliGRAM(s) Oral every 6 hours PRN agitation  OLANZapine Injectable 5 milliGRAM(s) IntraMuscular once PRN severe agitation  OLANZapine Injectable 5 milliGRAM(s) IntraMuscular daily PRN Refusing court ordered PO medication  traZODone 50 milliGRAM(s) Oral once PRN insomnia

## 2023-11-08 NOTE — BH INPATIENT PSYCHIATRY PROGRESS NOTE - NSBHFUPINTERVALHXFT_PSY_A_CORE
VSS, accepting medications, no PRNs for agitation. Per staff, last night, up most of the night perseverating on legal status and dislike for team MD, changing names on her visitor restriction, calling sister her "psycho sister."   VSS, accepting medications, no PRNs for agitation. Per staff, last night, up most of the night perseverating on legal status and dislike for team MD, changing names on her visitor restriction, calling sister her "psycho sister." Slept 1-2 hours overnight.    Patient seen in room with team RN. Patient acknowledges sleeping poorly last night, stating she had a lot on her mind, and thinking about how she ended up here. Patient immediately defensive and accusatory at any questions asked by this psychiatrist about her well being. Attempted to engage patient in discussion around our therapeutic relationship, but patient was unable to tolerate and continued to interrupt before this writer could even complete a sentence.     Later, informed by staff that patient was very irritable and disorganized in art group, and ruined multiple glue sticks.

## 2023-11-08 NOTE — BH INPATIENT PSYCHIATRY PROGRESS NOTE - MSE UNSTRUCTURED FT
Appearance: hygiene-intact though slightly poor grooming, casually dressed  Behavior: guarded, mistrustful, no PMA/PMR, less hostility  Speech: normal volume, can be hyperverbal and difficult to interrupt  Mood: "fine"  Affect: irritable   Thought process: illogical, largely organized and linear  Thought content: remains paranoid and accusatory, particularly with treating psychiatrist  Perceptions: no AH, VH elicited, not internally pre-occupied appearing  Insight: extremely poor  Judgement: poor  Cognition: grossly intact   Gait: intact Appearance: hygiene-intact though slightly poor grooming, casually dressed  Behavior: guarded, mistrustful, hostile, no PMA/PMR  Speech: loud, hyperverbal and difficult to interrupt  Mood: "fine"  Affect: irritable   Thought process: illogical, more disorganized   Thought content: remains paranoid and accusatory, particularly with treating psychiatrist and about certain family members  Perceptions: no AH, VH elicited, not internally pre-occupied appearing  Insight: extremely poor  Judgement: poor  Cognition: grossly intact   Gait: intact

## 2023-11-08 NOTE — BH INPATIENT PSYCHIATRY PROGRESS NOTE - NSBHCHARTREVIEWVS_PSY_A_CORE FT
Vital Signs Last 24 Hrs  T(C): 36.3 (11-08-23 @ 06:26), Max: 36.3 (11-08-23 @ 06:26)  T(F): 97.3 (11-08-23 @ 06:26), Max: 97.3 (11-08-23 @ 06:26)  HR: --  BP: --  BP(mean): --  RR: --  SpO2: --    Orthostatic VS  11-08-23 @ 06:26  Lying BP: --/-- HR: --  Sitting BP: 126/87 HR: 86  Standing BP: --/-- HR: --  Site: --  Mode: --  Orthostatic VS  11-07-23 @ 07:51  Lying BP: --/-- HR: --  Sitting BP: 111/69 HR: 87  Standing BP: --/-- HR: --  Site: --  Mode: --

## 2023-11-09 PROCEDURE — 99232 SBSQ HOSP IP/OBS MODERATE 35: CPT

## 2023-11-09 RX ORDER — PALIPERIDONE 1.5 MG/1
9 TABLET, EXTENDED RELEASE ORAL DAILY
Refills: 0 | Status: DISCONTINUED | OUTPATIENT
Start: 2023-11-09 | End: 2023-11-13

## 2023-11-09 RX ORDER — ARIPIPRAZOLE 15 MG/1
5 TABLET ORAL DAILY
Refills: 0 | Status: COMPLETED | OUTPATIENT
Start: 2023-11-09 | End: 2023-11-12

## 2023-11-09 RX ADMIN — Medication 1 PATCH: at 07:48

## 2023-11-09 RX ADMIN — ARIPIPRAZOLE 10 MILLIGRAM(S): 15 TABLET ORAL at 08:42

## 2023-11-09 RX ADMIN — Medication 1 PATCH: at 22:32

## 2023-11-09 RX ADMIN — Medication 2 MILLIGRAM(S): at 17:55

## 2023-11-09 RX ADMIN — PALIPERIDONE 6 MILLIGRAM(S): 1.5 TABLET, EXTENDED RELEASE ORAL at 08:42

## 2023-11-09 RX ADMIN — Medication 1 PATCH: at 08:42

## 2023-11-09 RX ADMIN — Medication 2 MILLIGRAM(S): at 08:44

## 2023-11-09 NOTE — BH INPATIENT PSYCHIATRY PROGRESS NOTE - NSBHCHARTREVIEWVS_PSY_A_CORE FT
Vital Signs Last 24 Hrs  T(C): 36.8 (11-09-23 @ 06:39), Max: 36.8 (11-09-23 @ 06:39)  T(F): 98.3 (11-09-23 @ 06:39), Max: 98.3 (11-09-23 @ 06:39)  HR: --  BP: --  BP(mean): --  RR: --  SpO2: --    Orthostatic VS  11-09-23 @ 06:39  Lying BP: --/-- HR: --  Sitting BP: 123/80 HR: 97  Standing BP: --/-- HR: --  Site: --  Mode: --  Orthostatic VS  11-08-23 @ 06:26  Lying BP: --/-- HR: --  Sitting BP: 126/87 HR: 86  Standing BP: --/-- HR: --  Site: --  Mode: --

## 2023-11-09 NOTE — BH INPATIENT PSYCHIATRY PROGRESS NOTE - MSE UNSTRUCTURED FT
Appearance: hygiene-intact though slightly poor grooming, casually dressed  Behavior: guarded, mistrustful, hostile, no PMA/PMR  Speech: loud, hyperverbal and difficult to interrupt  Mood: "fine"  Affect: irritable   Thought process: illogical, more disorganized   Thought content: remains paranoid and accusatory, particularly with treating psychiatrist and about certain family members  Perceptions: no AH, VH elicited, not internally pre-occupied appearing  Insight: extremely poor  Judgement: poor  Cognition: grossly intact   Gait: intact Appearance: hygiene-intact though slightly poor grooming, casually dressed  Behavior: guarded, less mistrustful, no PMA/PMR  Speech: normal volume, but remains hyperverbal and difficult to interrupt at times  Mood: "fine"  Affect: calm, neutral  Thought process: linear, organized, though can jump around in subjects  Thought content: less paranoid/accusatory today  Perceptions: no AH, VH elicited, not internally pre-occupied appearing  Insight: extremely poor  Judgement: poor  Cognition: grossly intact   Gait: intact

## 2023-11-09 NOTE — BH INPATIENT PSYCHIATRY PROGRESS NOTE - NSBHFUPINTERVALHXFT_PSY_A_CORE
VSS, accepting medications, no PRNs for agitation, no acute events.  VSS, accepting medications, no PRNs for agitation, no acute events. Slept 5+ hours per sleep log.    Patient seen with RN and SW. She states she is fine, and hasn't had any noticeable side effects yet from Invega. References only being here because of family members who are psychopaths. Overall calmer today and more amenable to speaking with MD. Asks many questions about possible side effects of Invega. Discuss concern for weight gain (notes she's gained 10lbs + here), as well as prior experiences with weight gain from antipsychotics. Discuss metformin, and patient will consider.
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
## 2023-11-09 NOTE — BH INPATIENT PSYCHIATRY PROGRESS NOTE - CURRENT MEDICATION
MEDICATIONS  (STANDING):  ARIPiprazole 10 milliGRAM(s) Oral daily  nicotine -  14 mG/24Hr(s) Patch 1 Patch Transdermal daily  paliperidone ER 6 milliGRAM(s) Oral daily    MEDICATIONS  (PRN):  acetaminophen     Tablet .. 650 milliGRAM(s) Oral every 6 hours PRN Temp greater or equal to 38C (100.4F), Mild Pain (1 - 3)  LORazepam     Tablet 2 milliGRAM(s) Oral every 6 hours PRN agitation  nicotine  Polacrilex Gum 2 milliGRAM(s) Oral every 2 hours PRN nicotine cravings  OLANZapine 5 milliGRAM(s) Oral every 6 hours PRN agitation  OLANZapine Injectable 5 milliGRAM(s) IntraMuscular once PRN severe agitation  OLANZapine Injectable 5 milliGRAM(s) IntraMuscular daily PRN Refusing court ordered PO medication  traZODone 50 milliGRAM(s) Oral once PRN insomnia   MEDICATIONS  (STANDING):  ARIPiprazole 10 milliGRAM(s) Oral daily  nicotine -  14 mG/24Hr(s) Patch 1 Patch Transdermal daily  paliperidone ER 6 milliGRAM(s) Oral daily    MEDICATIONS  (PRN):  acetaminophen     Tablet .. 650 milliGRAM(s) Oral every 6 hours PRN Temp greater or equal to 38C (100.4F), Mild Pain (1 - 3)  LORazepam     Tablet 2 milliGRAM(s) Oral every 6 hours PRN agitation  nicotine  Polacrilex Gum 2 milliGRAM(s) Oral every 2 hours PRN nicotine cravings  OLANZapine 5 milliGRAM(s) Oral every 6 hours PRN agitation  OLANZapine Injectable 5 milliGRAM(s) IntraMuscular daily PRN Refusing court ordered PO medication  OLANZapine Injectable 5 milliGRAM(s) IntraMuscular once PRN severe agitation  traZODone 50 milliGRAM(s) Oral once PRN insomnia

## 2023-11-10 PROCEDURE — 99232 SBSQ HOSP IP/OBS MODERATE 35: CPT

## 2023-11-10 PROCEDURE — 90853 GROUP PSYCHOTHERAPY: CPT

## 2023-11-10 RX ORDER — METFORMIN HYDROCHLORIDE 850 MG/1
250 TABLET ORAL
Refills: 0 | Status: DISCONTINUED | OUTPATIENT
Start: 2023-11-10 | End: 2023-11-15

## 2023-11-10 RX ADMIN — Medication 1 PATCH: at 08:20

## 2023-11-10 RX ADMIN — METFORMIN HYDROCHLORIDE 250 MILLIGRAM(S): 850 TABLET ORAL at 17:34

## 2023-11-10 RX ADMIN — ARIPIPRAZOLE 5 MILLIGRAM(S): 15 TABLET ORAL at 08:20

## 2023-11-10 RX ADMIN — Medication 1 PATCH: at 19:47

## 2023-11-10 RX ADMIN — PALIPERIDONE 9 MILLIGRAM(S): 1.5 TABLET, EXTENDED RELEASE ORAL at 08:20

## 2023-11-10 NOTE — BH INPATIENT PSYCHIATRY PROGRESS NOTE - CURRENT MEDICATION
MEDICATIONS  (STANDING):  ARIPiprazole 5 milliGRAM(s) Oral daily  nicotine -  14 mG/24Hr(s) Patch 1 Patch Transdermal daily  paliperidone ER 9 milliGRAM(s) Oral daily    MEDICATIONS  (PRN):  acetaminophen     Tablet .. 650 milliGRAM(s) Oral every 6 hours PRN Temp greater or equal to 38C (100.4F), Mild Pain (1 - 3)  LORazepam     Tablet 2 milliGRAM(s) Oral every 6 hours PRN agitation  nicotine  Polacrilex Gum 2 milliGRAM(s) Oral every 2 hours PRN nicotine cravings  OLANZapine 5 milliGRAM(s) Oral every 6 hours PRN agitation  OLANZapine Injectable 5 milliGRAM(s) IntraMuscular once PRN severe agitation  OLANZapine Injectable 5 milliGRAM(s) IntraMuscular daily PRN Refusing court ordered PO medication  traZODone 50 milliGRAM(s) Oral once PRN insomnia
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
## 2023-11-10 NOTE — BH INPATIENT PSYCHIATRY PROGRESS NOTE - MSE UNSTRUCTURED FT
Appearance: hygiene-intact though slightly poor grooming, casually dressed  Behavior: guarded, less mistrustful, no PMA/PMR  Speech: normal volume, but remains hyperverbal and difficult to interrupt at times  Mood: "fine"  Affect: calm, neutral  Thought process: linear, organized, though can jump around in subjects  Thought content: less paranoid/accusatory today  Perceptions: no AH, VH elicited, not internally pre-occupied appearing  Insight: extremely poor  Judgement: poor  Cognition: grossly intact   Gait: intact Appearance: hygiene-intact though slightly poor grooming, casually dressed  Behavior: guarded, mistrustful, no PMA/PMR  Speech: normal volume, but remains hyperverbal and difficult to interrupt at times  Mood: "okay"  Affect: calm, neutral initially, then more irritable  Thought process: mild disorganization, particular when upset  Thought content: less paranoid/accusatory overall today, though becomes more so during end of interview, reading into perceived looks/glances by MD  Perceptions: no AH, VH elicited, not internally pre-occupied appearing  Insight: poor  Judgement: poor  Cognition: grossly intact   Gait: intact
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
## 2023-11-10 NOTE — BH INPATIENT PSYCHIATRY PROGRESS NOTE - NSBHCHARTREVIEWVS_PSY_A_CORE FT
Vital Signs Last 24 Hrs  T(C): 36.7 (11-10-23 @ 08:14), Max: 36.7 (11-10-23 @ 08:14)  T(F): 98.1 (11-10-23 @ 08:14), Max: 98.1 (11-10-23 @ 08:14)  HR: --  BP: --  BP(mean): --  RR: --  SpO2: --    Orthostatic VS  11-10-23 @ 08:14  Lying BP: --/-- HR: --  Sitting BP: 134/87 HR: 101  Standing BP: --/-- HR: --  Site: --  Mode: --  Orthostatic VS  11-09-23 @ 06:39  Lying BP: --/-- HR: --  Sitting BP: 123/80 HR: 97  Standing BP: --/-- HR: --  Site: --  Mode: --

## 2023-11-11 RX ADMIN — ARIPIPRAZOLE 5 MILLIGRAM(S): 15 TABLET ORAL at 10:16

## 2023-11-11 RX ADMIN — METFORMIN HYDROCHLORIDE 250 MILLIGRAM(S): 850 TABLET ORAL at 10:17

## 2023-11-11 RX ADMIN — PALIPERIDONE 9 MILLIGRAM(S): 1.5 TABLET, EXTENDED RELEASE ORAL at 10:16

## 2023-11-11 RX ADMIN — Medication 1 PATCH: at 10:17

## 2023-11-11 RX ADMIN — METFORMIN HYDROCHLORIDE 250 MILLIGRAM(S): 850 TABLET ORAL at 16:12

## 2023-11-12 RX ADMIN — METFORMIN HYDROCHLORIDE 250 MILLIGRAM(S): 850 TABLET ORAL at 08:28

## 2023-11-12 RX ADMIN — Medication 1 PATCH: at 09:08

## 2023-11-12 RX ADMIN — ARIPIPRAZOLE 5 MILLIGRAM(S): 15 TABLET ORAL at 08:28

## 2023-11-12 RX ADMIN — Medication 2 MILLIGRAM(S): at 16:48

## 2023-11-12 RX ADMIN — METFORMIN HYDROCHLORIDE 250 MILLIGRAM(S): 850 TABLET ORAL at 16:37

## 2023-11-12 RX ADMIN — PALIPERIDONE 9 MILLIGRAM(S): 1.5 TABLET, EXTENDED RELEASE ORAL at 08:28

## 2023-11-12 RX ADMIN — Medication 2 MILLIGRAM(S): at 12:44

## 2023-11-12 RX ADMIN — Medication 1 PATCH: at 08:28

## 2023-11-13 RX ORDER — PALIPERIDONE 1.5 MG/1
12 TABLET, EXTENDED RELEASE ORAL DAILY
Refills: 0 | Status: DISCONTINUED | OUTPATIENT
Start: 2023-11-13 | End: 2023-11-20

## 2023-11-13 RX ADMIN — METFORMIN HYDROCHLORIDE 250 MILLIGRAM(S): 850 TABLET ORAL at 16:33

## 2023-11-13 RX ADMIN — METFORMIN HYDROCHLORIDE 250 MILLIGRAM(S): 850 TABLET ORAL at 08:09

## 2023-11-13 RX ADMIN — Medication 1 PATCH: at 08:09

## 2023-11-13 RX ADMIN — Medication 2 MILLIGRAM(S): at 00:03

## 2023-11-13 RX ADMIN — Medication 1 PATCH: at 19:20

## 2023-11-13 RX ADMIN — PALIPERIDONE 9 MILLIGRAM(S): 1.5 TABLET, EXTENDED RELEASE ORAL at 08:10
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
## 2023-11-13 NOTE — BH INPATIENT PSYCHIATRY PROGRESS NOTE - NSBHCHARTREVIEWVS_PSY_A_CORE FT
Vital Signs Last 24 Hrs  T(C): 36.6 (11-12-23 @ 09:40), Max: 36.6 (11-12-23 @ 09:40)  T(F): 97.9 (11-12-23 @ 09:40), Max: 97.9 (11-12-23 @ 09:40)  HR: --  BP: --  BP(mean): --  RR: --  SpO2: --    Orthostatic VS  11-12-23 @ 09:40  Lying BP: --/-- HR: --  Sitting BP: 110/76 HR: 77  Standing BP: --/-- HR: --  Site: --  Mode: --   Vital Signs Last 24 Hrs  T(C): 36.5 (11-13-23 @ 08:52), Max: 36.5 (11-13-23 @ 08:52)  T(F): 97.7 (11-13-23 @ 08:52), Max: 97.7 (11-13-23 @ 08:52)  HR: --  BP: --  BP(mean): --  RR: --  SpO2: --    Orthostatic VS  11-13-23 @ 08:52  Lying BP: --/-- HR: --  Sitting BP: 114/67 HR: 91  Standing BP: --/-- HR: --  Site: --  Mode: --  Orthostatic VS  11-12-23 @ 09:40  Lying BP: --/-- HR: --  Sitting BP: 110/76 HR: 77  Standing BP: --/-- HR: --  Site: --  Mode: --

## 2023-11-13 NOTE — BH INPATIENT PSYCHIATRY PROGRESS NOTE - CURRENT MEDICATION
MEDICATIONS  (STANDING):  metFORMIN 250 milliGRAM(s) Oral two times a day with meals  nicotine -  14 mG/24Hr(s) Patch 1 Patch Transdermal daily  paliperidone ER 9 milliGRAM(s) Oral daily    MEDICATIONS  (PRN):  acetaminophen     Tablet .. 650 milliGRAM(s) Oral every 6 hours PRN Temp greater or equal to 38C (100.4F), Mild Pain (1 - 3)  LORazepam     Tablet 2 milliGRAM(s) Oral every 6 hours PRN agitation  nicotine  Polacrilex Gum 2 milliGRAM(s) Oral every 2 hours PRN nicotine cravings  OLANZapine 5 milliGRAM(s) Oral every 6 hours PRN agitation  OLANZapine Injectable 5 milliGRAM(s) IntraMuscular daily PRN Refusing court ordered PO medication  OLANZapine Injectable 5 milliGRAM(s) IntraMuscular once PRN severe agitation  traZODone 50 milliGRAM(s) Oral once PRN insomnia

## 2023-11-13 NOTE — BH INPATIENT PSYCHIATRY PROGRESS NOTE - MSE UNSTRUCTURED FT
Appearance: hygiene-intact though slightly poor grooming, casually dressed  Behavior: guarded, mistrustful, no PMA/PMR  Speech: normal volume, but remains hyperverbal and difficult to interrupt at times  Mood: "okay"  Affect: calm, neutral initially, then more irritable  Thought process: mild disorganization, particular when upset  Thought content: less paranoid/accusatory overall today, though becomes more so during end of interview, reading into perceived looks/glances by MD  Perceptions: no AH, VH elicited, not internally pre-occupied appearing  Insight: poor  Judgement: poor  Cognition: grossly intact   Gait: intact Appearance: hygiene-intact, casually dressed  Behavior: guarded but much less mistrustful appearing, no PMA/PMR  Speech: normal volume, but remains hyperverbal and difficult to interrupt at times  Mood: "okay"  Affect: calm, neutral, no irritability   Thought process: mild disorganization, particularly when discussing paranoid content  Thought content: non-accusatory or paranoid with his MD today, still paranoid towards family  Perceptions: no AH, VH elicited, not internally pre-occupied appearing  Insight: improving, able to acknowledge being paranoid  Judgement: fair  Cognition: grossly intact   Gait: intact

## 2023-11-14 PROCEDURE — 99231 SBSQ HOSP IP/OBS SF/LOW 25: CPT

## 2023-11-14 PROCEDURE — 90853 GROUP PSYCHOTHERAPY: CPT

## 2023-11-14 RX ADMIN — PALIPERIDONE 12 MILLIGRAM(S): 1.5 TABLET, EXTENDED RELEASE ORAL at 08:44

## 2023-11-14 RX ADMIN — Medication 1 PATCH: at 19:50

## 2023-11-14 RX ADMIN — METFORMIN HYDROCHLORIDE 250 MILLIGRAM(S): 850 TABLET ORAL at 08:44

## 2023-11-14 RX ADMIN — Medication 1 PATCH: at 08:48

## 2023-11-14 NOTE — BH INPATIENT PSYCHIATRY PROGRESS NOTE - NSBHATTESTBILLING_PSY_A_CORE
49720-[id number] OBS or IP - moderate complexity OR 35-49 mins 40560-[id number] OBS or IP - low complexity OR 25-34 mins

## 2023-11-14 NOTE — BH INPATIENT PSYCHIATRY PROGRESS NOTE - CURRENT MEDICATION
MEDICATIONS  (STANDING):  metFORMIN 250 milliGRAM(s) Oral two times a day with meals  nicotine -  14 mG/24Hr(s) Patch 1 Patch Transdermal daily  paliperidone ER 12 milliGRAM(s) Oral daily    MEDICATIONS  (PRN):  acetaminophen     Tablet .. 650 milliGRAM(s) Oral every 6 hours PRN Temp greater or equal to 38C (100.4F), Mild Pain (1 - 3)  LORazepam     Tablet 2 milliGRAM(s) Oral every 6 hours PRN agitation  nicotine  Polacrilex Gum 2 milliGRAM(s) Oral every 2 hours PRN nicotine cravings  OLANZapine 5 milliGRAM(s) Oral every 6 hours PRN agitation  OLANZapine Injectable 5 milliGRAM(s) IntraMuscular once PRN severe agitation  OLANZapine Injectable 5 milliGRAM(s) IntraMuscular daily PRN Refusing court ordered PO medication  traZODone 50 milliGRAM(s) Oral once PRN insomnia   MEDICATIONS  (STANDING):  metFORMIN 250 milliGRAM(s) Oral two times a day with meals  nicotine -  14 mG/24Hr(s) Patch 1 Patch Transdermal daily  paliperidone ER 12 milliGRAM(s) Oral daily    MEDICATIONS  (PRN):  acetaminophen     Tablet .. 650 milliGRAM(s) Oral every 6 hours PRN Temp greater or equal to 38C (100.4F), Mild Pain (1 - 3)  LORazepam     Tablet 2 milliGRAM(s) Oral every 6 hours PRN agitation  nicotine  Polacrilex Gum 2 milliGRAM(s) Oral every 2 hours PRN nicotine cravings  OLANZapine 5 milliGRAM(s) Oral every 6 hours PRN agitation  OLANZapine Injectable 5 milliGRAM(s) IntraMuscular daily PRN Refusing court ordered PO medication  OLANZapine Injectable 5 milliGRAM(s) IntraMuscular once PRN severe agitation  traZODone 50 milliGRAM(s) Oral once PRN insomnia

## 2023-11-14 NOTE — BH INPATIENT PSYCHIATRY PROGRESS NOTE - NSBHMETABOLIC_PSY_ALL_CORE_FT
BMI:   HbA1c: A1C with Estimated Average Glucose Result: 4.9 % (10-18-23 @ 11:45)    Glucose:   BP: 114/72 (11-14-23 @ 07:35) (114/72 - 114/72)  Lipid Panel: Date/Time: 10-18-23 @ 11:45  Cholesterol, Serum: 123  Direct LDL: --  HDL Cholesterol, Serum: 49  Total Cholesterol/HDL Ration Measurement: --  Triglycerides, Serum: 153

## 2023-11-14 NOTE — BH INPATIENT PSYCHIATRY PROGRESS NOTE - MSE UNSTRUCTURED FT
Appearance: hygiene-intact, casually dressed  Behavior: guarded but much less mistrustful appearing, no PMA/PMR  Speech: normal volume, but remains hyperverbal and difficult to interrupt at times  Mood: "okay"  Affect: calm, neutral, no irritability   Thought process: mild disorganization, particularly when discussing paranoid content  Thought content: non-accusatory or paranoid with his MD today, still paranoid towards family  Perceptions: no AH, VH elicited, not internally pre-occupied appearing  Insight: improving, able to acknowledge being paranoid  Judgement: fair  Cognition: grossly intact   Gait: intact Appearance: hygiene-intact, casually dressed  Behavior: guarded but much mistrustful appearing, no PMA/PMR  Speech: normal volume, but remains hyperverbal and difficult to interrupt at times  Mood: "fine"  Affect: calm, neutral, mild irritability   Thought process: mild disorganization, particularly when discussing paranoid content  Thought content: less accusatory/paranoid with this MD today, still paranoid towards family  Perceptions: no AH, VH elicited, not internally pre-occupied appearing  Insight: improving, able to acknowledge being paranoid in past, but not that this was the cause of this hospitalization or that medications are helpful  Judgement: fair  Cognition: grossly intact   Gait: intact
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
## 2023-11-14 NOTE — BH INPATIENT PSYCHIATRY PROGRESS NOTE - NSBHCHARTREVIEWVS_PSY_A_CORE FT
Vital Signs Last 24 Hrs  T(C): 36.7 (11-14-23 @ 07:35), Max: 36.7 (11-14-23 @ 07:35)  T(F): 98.1 (11-14-23 @ 07:35), Max: 98.1 (11-14-23 @ 07:35)  HR: 81 (11-14-23 @ 07:35) (81 - 81)  BP: 114/72 (11-14-23 @ 07:35) (114/72 - 114/72)  BP(mean): --  RR: --  SpO2: --    Orthostatic VS  11-13-23 @ 08:52  Lying BP: --/-- HR: --  Sitting BP: 114/67 HR: 91  Standing BP: --/-- HR: --  Site: --  Mode: --  Orthostatic VS  11-12-23 @ 09:40  Lying BP: --/-- HR: --  Sitting BP: 110/76 HR: 77  Standing BP: --/-- HR: --  Site: --  Mode: --   Vital Signs Last 24 Hrs  T(C): 36.7 (11-14-23 @ 07:35), Max: 36.7 (11-14-23 @ 07:35)  T(F): 98.1 (11-14-23 @ 07:35), Max: 98.1 (11-14-23 @ 07:35)  HR: 81 (11-14-23 @ 07:35) (81 - 81)  BP: 114/72 (11-14-23 @ 07:35) (114/72 - 114/72)  BP(mean): --  RR: --  SpO2: --    Orthostatic VS  11-13-23 @ 08:52  Lying BP: --/-- HR: --  Sitting BP: 114/67 HR: 91  Standing BP: --/-- HR: --  Site: --  Mode: --

## 2023-11-14 NOTE — BH INPATIENT PSYCHIATRY PROGRESS NOTE - NSBHFUPINTERVALHXFT_PSY_A_CORE
VSS, accepting PO medications, no acute events.  VSS, accepting PO medications, no acute events. Slept 6 hours per sleep log.    Patient seen in hallway with medical student present. She is calm with this writer, states that she is feeling fine. Denies tremor or restlessness, may have mild muscle soreness but isn't sure. Discusses her 's visit, and ongoing thoughts about his mental struggles and his brother-in-law being a psychopath. At the end of meeting, asks to speak to this writer privately. Gets close to this writer and states she does not want to call the AMA on this writer, and she respects what this writer had to do to get to this position, but also that if this writer "gaslights" her again, she will call the AMA.

## 2023-11-15 PROCEDURE — 99231 SBSQ HOSP IP/OBS SF/LOW 25: CPT

## 2023-11-15 RX ORDER — METFORMIN HYDROCHLORIDE 850 MG/1
500 TABLET ORAL
Refills: 0 | Status: DISCONTINUED | OUTPATIENT
Start: 2023-11-15 | End: 2023-12-05

## 2023-11-15 RX ADMIN — METFORMIN HYDROCHLORIDE 500 MILLIGRAM(S): 850 TABLET ORAL at 17:02

## 2023-11-15 RX ADMIN — Medication 1 PATCH: at 09:53

## 2023-11-15 RX ADMIN — Medication 2 MILLIGRAM(S): at 12:44

## 2023-11-15 RX ADMIN — METFORMIN HYDROCHLORIDE 250 MILLIGRAM(S): 850 TABLET ORAL at 09:53

## 2023-11-15 RX ADMIN — Medication 2 MILLIGRAM(S): at 21:22

## 2023-11-15 RX ADMIN — PALIPERIDONE 12 MILLIGRAM(S): 1.5 TABLET, EXTENDED RELEASE ORAL at 09:53

## 2023-11-15 NOTE — BH INPATIENT PSYCHIATRY PROGRESS NOTE - CURRENT MEDICATION
MEDICATIONS  (STANDING):  metFORMIN 250 milliGRAM(s) Oral two times a day with meals  nicotine -  14 mG/24Hr(s) Patch 1 Patch Transdermal daily  paliperidone ER 12 milliGRAM(s) Oral daily    MEDICATIONS  (PRN):  acetaminophen     Tablet .. 650 milliGRAM(s) Oral every 6 hours PRN Temp greater or equal to 38C (100.4F), Mild Pain (1 - 3)  LORazepam     Tablet 2 milliGRAM(s) Oral every 6 hours PRN agitation  nicotine  Polacrilex Gum 2 milliGRAM(s) Oral every 2 hours PRN nicotine cravings  OLANZapine 5 milliGRAM(s) Oral every 6 hours PRN agitation  OLANZapine Injectable 5 milliGRAM(s) IntraMuscular daily PRN Refusing court ordered PO medication  OLANZapine Injectable 5 milliGRAM(s) IntraMuscular once PRN severe agitation  traZODone 50 milliGRAM(s) Oral once PRN insomnia   MEDICATIONS  (STANDING):  metFORMIN 250 milliGRAM(s) Oral two times a day with meals  nicotine -  14 mG/24Hr(s) Patch 1 Patch Transdermal daily  paliperidone ER 12 milliGRAM(s) Oral daily    MEDICATIONS  (PRN):  acetaminophen     Tablet .. 650 milliGRAM(s) Oral every 6 hours PRN Temp greater or equal to 38C (100.4F), Mild Pain (1 - 3)  LORazepam     Tablet 2 milliGRAM(s) Oral every 6 hours PRN agitation  nicotine  Polacrilex Gum 2 milliGRAM(s) Oral every 2 hours PRN nicotine cravings  OLANZapine 5 milliGRAM(s) Oral every 6 hours PRN agitation  OLANZapine Injectable 5 milliGRAM(s) IntraMuscular once PRN severe agitation  OLANZapine Injectable 5 milliGRAM(s) IntraMuscular daily PRN Refusing court ordered PO medication  traZODone 50 milliGRAM(s) Oral once PRN insomnia

## 2023-11-15 NOTE — BH INPATIENT PSYCHIATRY PROGRESS NOTE - MSE UNSTRUCTURED FT
Appearance: hygiene-intact, casually dressed  Behavior: guarded but much mistrustful appearing, no PMA/PMR  Speech: normal volume, but remains hyperverbal and difficult to interrupt at times  Mood: "fine"  Affect: calm, neutral, mild irritability   Thought process: mild disorganization, particularly when discussing paranoid content  Thought content: less accusatory/paranoid with this MD today, still paranoid towards family  Perceptions: no AH, VH elicited, not internally pre-occupied appearing  Insight: improving, able to acknowledge being paranoid in past, but not that this was the cause of this hospitalization or that medications are helpful  Judgement: fair  Cognition: grossly intact   Gait: intact Appearance: hygiene-intact, casually dressed  Behavior: still guarded but far less mistrustful appearing, no PMA/PMR  Speech: normal volume and normal rate, not hyperverbal  Mood: "fine"  Affect: calm, neutral, no irritability   Thought process: largely linear and organized  Thought content: far less accusatory/paranoid with this MD today, still somewhat paranoid towards family  Perceptions: no AH, VH elicited, not internally pre-occupied appearing  Insight: improving, able to acknowledge being paranoid in past, but not that this was the cause of this hospitalization or that medications are helpful  Judgement: fair  Cognition: grossly intact   Gait: intact

## 2023-11-15 NOTE — BH INPATIENT PSYCHIATRY PROGRESS NOTE - NSBHFUPINTERVALHXFT_PSY_A_CORE
VSS, accepting medications besides one dose of metformin, no PRNs for agitation, no acute events. VSS, accepting medications besides one dose of metformin, no PRNs for agitation, no acute events.     Patient found sleeping in bed in the late morning. She is calm and pleasant with this writer. States that she is fine, but has been thinking a lot about things, including financial issues which are connected to her brother-in-law being a psychopath. Denies muscle stiffness, tremor or restlessness. Denies GI distress. Amenable to increasing her metformin today.
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
## 2023-11-15 NOTE — BH INPATIENT PSYCHIATRY PROGRESS NOTE - NSBHMETABOLIC_PSY_ALL_CORE_FT
BMI:   HbA1c: A1C with Estimated Average Glucose Result: 4.9 % (10-18-23 @ 11:45)    Glucose:   BP: 98/77 (11-15-23 @ 08:17) (98/77 - 114/72)  Lipid Panel: Date/Time: 10-18-23 @ 11:45  Cholesterol, Serum: 123  Direct LDL: --  HDL Cholesterol, Serum: 49  Total Cholesterol/HDL Ration Measurement: --  Triglycerides, Serum: 153

## 2023-11-15 NOTE — BH INPATIENT PSYCHIATRY PROGRESS NOTE - NSBHCHARTREVIEWVS_PSY_A_CORE FT
Vital Signs Last 24 Hrs  T(C): 36.8 (11-15-23 @ 08:17), Max: 36.8 (11-15-23 @ 08:17)  T(F): 98.2 (11-15-23 @ 08:17), Max: 98.2 (11-15-23 @ 08:17)  HR: 68 (11-15-23 @ 08:17) (68 - 68)  BP: 98/77 (11-15-23 @ 08:17) (98/77 - 98/77)  BP(mean): --  RR: --  SpO2: --    Orthostatic VS  11-13-23 @ 08:52  Lying BP: --/-- HR: --  Sitting BP: 114/67 HR: 91  Standing BP: --/-- HR: --  Site: --  Mode: --   Vital Signs Last 24 Hrs  T(C): 36.8 (11-15-23 @ 08:17), Max: 36.8 (11-15-23 @ 08:17)  T(F): 98.2 (11-15-23 @ 08:17), Max: 98.2 (11-15-23 @ 08:17)  HR: 68 (11-15-23 @ 08:17) (68 - 68)  BP: 98/77 (11-15-23 @ 08:17) (98/77 - 98/77)  BP(mean): --  RR: --  SpO2: --

## 2023-11-16 PROCEDURE — 99231 SBSQ HOSP IP/OBS SF/LOW 25: CPT

## 2023-11-16 RX ORDER — LANOLIN ALCOHOL/MO/W.PET/CERES
3 CREAM (GRAM) TOPICAL AT BEDTIME
Refills: 0 | Status: DISCONTINUED | OUTPATIENT
Start: 2023-11-16 | End: 2023-11-21

## 2023-11-16 RX ORDER — PALIPERIDONE 1.5 MG/1
234 TABLET, EXTENDED RELEASE ORAL
Qty: 1 | Refills: 0
Start: 2023-11-16

## 2023-11-16 RX ADMIN — Medication 3 MILLIGRAM(S): at 21:18

## 2023-11-16 RX ADMIN — Medication 1 PATCH: at 19:52

## 2023-11-16 RX ADMIN — Medication 1 PATCH: at 08:09

## 2023-11-16 RX ADMIN — PALIPERIDONE 12 MILLIGRAM(S): 1.5 TABLET, EXTENDED RELEASE ORAL at 08:08

## 2023-11-16 RX ADMIN — METFORMIN HYDROCHLORIDE 500 MILLIGRAM(S): 850 TABLET ORAL at 08:08

## 2023-11-16 RX ADMIN — Medication 2 MILLIGRAM(S): at 17:31

## 2023-11-16 RX ADMIN — METFORMIN HYDROCHLORIDE 500 MILLIGRAM(S): 850 TABLET ORAL at 16:41

## 2023-11-16 NOTE — BH INPATIENT PSYCHIATRY PROGRESS NOTE - CURRENT MEDICATION
MEDICATIONS  (STANDING):  metFORMIN 500 milliGRAM(s) Oral two times a day with meals  nicotine -  14 mG/24Hr(s) Patch 1 Patch Transdermal daily  paliperidone ER 12 milliGRAM(s) Oral daily    MEDICATIONS  (PRN):  acetaminophen     Tablet .. 650 milliGRAM(s) Oral every 6 hours PRN Temp greater or equal to 38C (100.4F), Mild Pain (1 - 3)  LORazepam     Tablet 2 milliGRAM(s) Oral every 6 hours PRN agitation  nicotine  Polacrilex Gum 2 milliGRAM(s) Oral every 2 hours PRN nicotine cravings  OLANZapine 5 milliGRAM(s) Oral every 6 hours PRN agitation  OLANZapine Injectable 5 milliGRAM(s) IntraMuscular once PRN severe agitation  OLANZapine Injectable 5 milliGRAM(s) IntraMuscular daily PRN Refusing court ordered PO medication  traZODone 50 milliGRAM(s) Oral once PRN insomnia   MEDICATIONS  (STANDING):  melatonin. 3 milliGRAM(s) Oral at bedtime  metFORMIN 500 milliGRAM(s) Oral two times a day with meals  nicotine -  14 mG/24Hr(s) Patch 1 Patch Transdermal daily  paliperidone ER 12 milliGRAM(s) Oral daily    MEDICATIONS  (PRN):  acetaminophen     Tablet .. 650 milliGRAM(s) Oral every 6 hours PRN Temp greater or equal to 38C (100.4F), Mild Pain (1 - 3)  LORazepam     Tablet 2 milliGRAM(s) Oral every 6 hours PRN agitation  nicotine  Polacrilex Gum 2 milliGRAM(s) Oral every 2 hours PRN nicotine cravings  OLANZapine 5 milliGRAM(s) Oral every 6 hours PRN agitation  OLANZapine Injectable 5 milliGRAM(s) IntraMuscular once PRN severe agitation  OLANZapine Injectable 5 milliGRAM(s) IntraMuscular daily PRN Refusing court ordered PO medication  traZODone 50 milliGRAM(s) Oral once PRN insomnia

## 2023-11-16 NOTE — BH INPATIENT PSYCHIATRY PROGRESS NOTE - NSBHFUPINTERVALHXFT_PSY_A_CORE
VSS, accepting medications, no PRNs for agitation, no acute events.  VSS, accepting medications, no PRNs for agitation, no acute events. Slept 2.5+ hours overnight.    Patient found in hallway, talking on the phone. She states she is doing fine. States her  told her he was jealous about how some staff members on the unit looked at her. She is working on calling insurance companies (may change her coverage) to find out about coverage for [employer]. States she didn't sleep well because she had a lot on her mind. Agreeable to melatonin. Encouraged not to sleep during the day. Denies muscle stiffness, tremor, restlessness. Denies GI symptoms.

## 2023-11-16 NOTE — BH INPATIENT PSYCHIATRY PROGRESS NOTE - MSE UNSTRUCTURED FT
Appearance: hygiene-intact, casually dressed  Behavior: still guarded but far less mistrustful appearing, no PMA/PMR  Speech: normal volume and normal rate, not hyperverbal  Mood: "fine"  Affect: calm, neutral, no irritability   Thought process: largely linear and organized  Thought content: far less accusatory/paranoid with this MD today, still somewhat paranoid towards family  Perceptions: no AH, VH elicited, not internally pre-occupied appearing  Insight: improving, able to acknowledge being paranoid in past, but not that this was the cause of this hospitalization or that medications are helpful  Judgement: fair  Cognition: grossly intact   Gait: intact Appearance: hygiene-intact, casually dressed  Behavior: still guarded but far less mistrustful, no PMA/PMR  Speech: still somewhat hyperverbal with rapid speech, but interruptable, normal volume  Mood: "fine"  Affect: calm, neutral, no irritability   Thought process: largely linear and organized  Thought content: not accusatory/paranoid with this MD today, still somewhat paranoid towards family  Perceptions: no AH, VH elicited, not internally pre-occupied appearing  Insight: improving, able to acknowledge being paranoid in past, but not that this was the cause of this hospitalization or that medications are helpful  Judgement: fair  Cognition: grossly intact   Gait: intact

## 2023-11-16 NOTE — BH INPATIENT PSYCHIATRY PROGRESS NOTE - NSBHCHARTREVIEWVS_PSY_A_CORE FT
Vital Signs Last 24 Hrs  T(C): 37.1 (11-16-23 @ 06:34), Max: 37.1 (11-16-23 @ 06:34)  T(F): 98.8 (11-16-23 @ 06:34), Max: 98.8 (11-16-23 @ 06:34)  HR: --  BP: --  BP(mean): --  RR: --  SpO2: --    Orthostatic VS  11-16-23 @ 06:34  Lying BP: --/-- HR: --  Sitting BP: 115/67 HR: 93  Standing BP: --/-- HR: --  Site: --  Mode: --

## 2023-11-16 NOTE — BH INPATIENT PSYCHIATRY PROGRESS NOTE - NSICDXBHTERTIARYDX_PSY_ALL_CORE
R/O Schizophrenia   F20.9  R/O Schizoaffective disorder   F25.9  R/O Delusional disorder   F22   R/O Schizoaffective disorder   F25.9
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
## 2023-11-17 PROCEDURE — 99231 SBSQ HOSP IP/OBS SF/LOW 25: CPT

## 2023-11-17 RX ADMIN — METFORMIN HYDROCHLORIDE 500 MILLIGRAM(S): 850 TABLET ORAL at 16:24

## 2023-11-17 RX ADMIN — METFORMIN HYDROCHLORIDE 500 MILLIGRAM(S): 850 TABLET ORAL at 08:54

## 2023-11-17 RX ADMIN — Medication 2 MILLIGRAM(S): at 21:06

## 2023-11-17 RX ADMIN — Medication 2 MILLIGRAM(S): at 14:02

## 2023-11-17 RX ADMIN — Medication 3 MILLIGRAM(S): at 21:06

## 2023-11-17 RX ADMIN — Medication 1 PATCH: at 19:34

## 2023-11-17 RX ADMIN — PALIPERIDONE 12 MILLIGRAM(S): 1.5 TABLET, EXTENDED RELEASE ORAL at 08:54

## 2023-11-17 RX ADMIN — Medication 1 PATCH: at 08:54

## 2023-11-17 NOTE — BH INPATIENT PSYCHIATRY PROGRESS NOTE - NSBHCHARTREVIEWVS_PSY_A_CORE FT
Vital Signs Last 24 Hrs  T(C): --  T(F): --  HR: --  BP: --  BP(mean): --  RR: --  SpO2: --    Orthostatic VS  11-16-23 @ 06:34  Lying BP: --/-- HR: --  Sitting BP: 115/67 HR: 93  Standing BP: --/-- HR: --  Site: --  Mode: --   Vital Signs Last 24 Hrs  T(C): 36.6 (11-17-23 @ 08:51), Max: 36.6 (11-17-23 @ 08:51)  T(F): 97.8 (11-17-23 @ 08:51), Max: 97.8 (11-17-23 @ 08:51)  HR: 73 (11-17-23 @ 08:51) (73 - 73)  BP: 93/49 (11-17-23 @ 08:51) (93/49 - 93/49)  BP(mean): --  RR: --  SpO2: --    Orthostatic VS  11-16-23 @ 06:34  Lying BP: --/-- HR: --  Sitting BP: 115/67 HR: 93  Standing BP: --/-- HR: --  Site: --  Mode: --
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
## 2023-11-17 NOTE — BH INPATIENT PSYCHIATRY PROGRESS NOTE - NSBHMETABOLIC_PSY_ALL_CORE_FT
Covid 19 swab taken from right nare, labeled, placed in red dot bag, and handed off to second healthcare worker outside of room for transport to laboratory per hospital policy and procedure. Patient tolerated procedure well. BMI:   HbA1c: A1C with Estimated Average Glucose Result: 4.9 % (10-18-23 @ 11:45)    Glucose:   BP: 98/77 (11-15-23 @ 08:17) (98/77 - 98/77)  Lipid Panel: Date/Time: 10-18-23 @ 11:45  Cholesterol, Serum: 123  Direct LDL: --  HDL Cholesterol, Serum: 49  Total Cholesterol/HDL Ration Measurement: --  Triglycerides, Serum: 153   BMI:   HbA1c: A1C with Estimated Average Glucose Result: 4.9 % (10-18-23 @ 11:45)    Glucose:   BP: 93/49 (11-17-23 @ 08:51) (93/49 - 98/77)  Lipid Panel: Date/Time: 10-18-23 @ 11:45  Cholesterol, Serum: 123  Direct LDL: --  HDL Cholesterol, Serum: 49  Total Cholesterol/HDL Ration Measurement: --  Triglycerides, Serum: 153

## 2023-11-17 NOTE — BH INPATIENT PSYCHIATRY PROGRESS NOTE - CURRENT MEDICATION
MEDICATIONS  (STANDING):  melatonin. 3 milliGRAM(s) Oral at bedtime  metFORMIN 500 milliGRAM(s) Oral two times a day with meals  nicotine -  14 mG/24Hr(s) Patch 1 Patch Transdermal daily  paliperidone ER 12 milliGRAM(s) Oral daily    MEDICATIONS  (PRN):  acetaminophen     Tablet .. 650 milliGRAM(s) Oral every 6 hours PRN Temp greater or equal to 38C (100.4F), Mild Pain (1 - 3)  LORazepam     Tablet 2 milliGRAM(s) Oral every 6 hours PRN agitation  nicotine  Polacrilex Gum 2 milliGRAM(s) Oral every 2 hours PRN nicotine cravings  OLANZapine 5 milliGRAM(s) Oral every 6 hours PRN agitation  OLANZapine Injectable 5 milliGRAM(s) IntraMuscular once PRN severe agitation  OLANZapine Injectable 5 milliGRAM(s) IntraMuscular daily PRN Refusing court ordered PO medication  traZODone 50 milliGRAM(s) Oral once PRN insomnia

## 2023-11-17 NOTE — BH INPATIENT PSYCHIATRY PROGRESS NOTE - MSE UNSTRUCTURED FT
Appearance: hygiene-intact, casually dressed  Behavior: still guarded but far less mistrustful, no PMA/PMR  Speech: still somewhat hyperverbal with rapid speech, but interruptable, normal volume  Mood: "fine"  Affect: calm, neutral, no irritability   Thought process: largely linear and organized  Thought content: not accusatory/paranoid with this MD today, still somewhat paranoid towards family  Perceptions: no AH, VH elicited, not internally pre-occupied appearing  Insight: improving, able to acknowledge being paranoid in past, but not that this was the cause of this hospitalization or that medications are helpful  Judgement: fair  Cognition: grossly intact   Gait: intact Appearance: hygiene-intact, casually dressed  Behavior: less guarded and no longer mistrustful, no PMA/PMR  Speech: still somewhat hyperverbal with rapid speech, but interruptable, normal volume  Mood: "fine"  Affect: calm, neutral, no irritability   Thought process: largely linear and organized  Thought content: not accusatory/paranoid with this MD today, still paranoid towards family when asked about them  Perceptions: no AH, VH elicited, not internally pre-occupied appearing  Insight: improving, able to acknowledge being paranoid in past, but not that this was the cause of this hospitalization or that medications are helpful  Judgement: fair  Cognition: grossly intact   Gait: intact

## 2023-11-18 RX ADMIN — Medication 2 MILLIGRAM(S): at 12:42

## 2023-11-18 RX ADMIN — Medication 1 PATCH: at 09:10

## 2023-11-18 RX ADMIN — METFORMIN HYDROCHLORIDE 500 MILLIGRAM(S): 850 TABLET ORAL at 09:10

## 2023-11-18 RX ADMIN — Medication 2 MILLIGRAM(S): at 18:44

## 2023-11-18 RX ADMIN — Medication 3 MILLIGRAM(S): at 22:36

## 2023-11-18 RX ADMIN — METFORMIN HYDROCHLORIDE 500 MILLIGRAM(S): 850 TABLET ORAL at 17:26

## 2023-11-18 RX ADMIN — PALIPERIDONE 12 MILLIGRAM(S): 1.5 TABLET, EXTENDED RELEASE ORAL at 09:10

## 2023-11-19 RX ADMIN — Medication 2 MILLIGRAM(S): at 11:19

## 2023-11-19 RX ADMIN — Medication 1 PATCH: at 08:07

## 2023-11-19 RX ADMIN — Medication 3 MILLIGRAM(S): at 21:00

## 2023-11-19 RX ADMIN — PALIPERIDONE 12 MILLIGRAM(S): 1.5 TABLET, EXTENDED RELEASE ORAL at 08:05

## 2023-11-19 RX ADMIN — Medication 1 PATCH: at 10:29

## 2023-11-19 RX ADMIN — METFORMIN HYDROCHLORIDE 500 MILLIGRAM(S): 850 TABLET ORAL at 08:05

## 2023-11-19 RX ADMIN — METFORMIN HYDROCHLORIDE 500 MILLIGRAM(S): 850 TABLET ORAL at 16:26

## 2023-11-19 RX ADMIN — Medication 1 PATCH: at 19:20

## 2023-11-20 PROCEDURE — 90853 GROUP PSYCHOTHERAPY: CPT

## 2023-11-20 PROCEDURE — 99232 SBSQ HOSP IP/OBS MODERATE 35: CPT

## 2023-11-20 RX ORDER — PALIPERIDONE 1.5 MG/1
234 TABLET, EXTENDED RELEASE ORAL ONCE
Refills: 0 | Status: COMPLETED | OUTPATIENT
Start: 2023-11-20 | End: 2023-11-20

## 2023-11-20 RX ORDER — PALIPERIDONE 1.5 MG/1
9 TABLET, EXTENDED RELEASE ORAL DAILY
Refills: 0 | Status: DISCONTINUED | OUTPATIENT
Start: 2023-11-20 | End: 2023-11-22

## 2023-11-20 RX ADMIN — Medication 1 PATCH: at 08:57

## 2023-11-20 RX ADMIN — PALIPERIDONE 12 MILLIGRAM(S): 1.5 TABLET, EXTENDED RELEASE ORAL at 08:00

## 2023-11-20 RX ADMIN — METFORMIN HYDROCHLORIDE 500 MILLIGRAM(S): 850 TABLET ORAL at 16:22

## 2023-11-20 RX ADMIN — Medication 2 MILLIGRAM(S): at 12:58

## 2023-11-20 RX ADMIN — METFORMIN HYDROCHLORIDE 500 MILLIGRAM(S): 850 TABLET ORAL at 07:59

## 2023-11-20 RX ADMIN — Medication 1 PATCH: at 08:00

## 2023-11-20 RX ADMIN — PALIPERIDONE 234 MILLIGRAM(S): 1.5 TABLET, EXTENDED RELEASE ORAL at 13:28

## 2023-11-20 NOTE — BH INPATIENT PSYCHIATRY PROGRESS NOTE - NSBHCHARTREVIEWVS_PSY_A_CORE FT
Vital Signs Last 24 Hrs  T(C): 36.5 (11-20-23 @ 08:26), Max: 36.5 (11-20-23 @ 08:26)  T(F): 97.7 (11-20-23 @ 08:26), Max: 97.7 (11-20-23 @ 08:26)  HR: --  BP: --  BP(mean): --  RR: --  SpO2: --    Orthostatic VS  11-20-23 @ 08:26  Lying BP: --/-- HR: --  Sitting BP: 112/72 HR: 77  Standing BP: 102/64 HR: 90  Site: --  Mode: --  Orthostatic VS  11-19-23 @ 08:14  Lying BP: --/-- HR: --  Sitting BP: 108/66 HR: 79  Standing BP: 100/60 HR: 93  Site: --  Mode: --

## 2023-11-20 NOTE — BH INPATIENT PSYCHIATRY PROGRESS NOTE - CURRENT MEDICATION
MEDICATIONS  (STANDING):  melatonin. 3 milliGRAM(s) Oral at bedtime  metFORMIN 500 milliGRAM(s) Oral two times a day with meals  nicotine -  14 mG/24Hr(s) Patch 1 Patch Transdermal daily  paliperidone ER 12 milliGRAM(s) Oral daily    MEDICATIONS  (PRN):  acetaminophen     Tablet .. 650 milliGRAM(s) Oral every 6 hours PRN Temp greater or equal to 38C (100.4F), Mild Pain (1 - 3)  LORazepam     Tablet 2 milliGRAM(s) Oral every 6 hours PRN agitation  nicotine  Polacrilex Gum 2 milliGRAM(s) Oral every 2 hours PRN nicotine cravings  OLANZapine 5 milliGRAM(s) Oral every 6 hours PRN agitation  OLANZapine Injectable 5 milliGRAM(s) IntraMuscular daily PRN Refusing court ordered PO medication  OLANZapine Injectable 5 milliGRAM(s) IntraMuscular once PRN severe agitation  traZODone 50 milliGRAM(s) Oral once PRN insomnia
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
## 2023-11-20 NOTE — BH INPATIENT PSYCHIATRY PROGRESS NOTE - MSE UNSTRUCTURED FT
Appearance: hygiene-intact, casually dressed  Behavior: less guarded and no longer overtly mistrustful, no PMA/PMR  Speech: still somewhat hyperverbal with rapid speech, but interruptable, normal volume  Mood: "good"  Affect: calm, neutral, no irritability   Thought process: largely linear and organized, though becomes disorganized when discussing paranoia  Thought content: not accusatory/paranoid with this MD today, still paranoid towards family   Perceptions: no AH, VH elicited, not internally pre-occupied appearing  Insight: improving, able to acknowledge being paranoid in past, but not that this was the cause of this hospitalization or that medications are helpful  Judgement: fair  Cognition: grossly intact   Gait: intact

## 2023-11-21 PROCEDURE — 99231 SBSQ HOSP IP/OBS SF/LOW 25: CPT

## 2023-11-21 RX ORDER — LANOLIN ALCOHOL/MO/W.PET/CERES
3 CREAM (GRAM) TOPICAL AT BEDTIME
Refills: 0 | Status: DISCONTINUED | OUTPATIENT
Start: 2023-11-21 | End: 2023-12-05

## 2023-11-21 RX ADMIN — METFORMIN HYDROCHLORIDE 500 MILLIGRAM(S): 850 TABLET ORAL at 16:35

## 2023-11-21 RX ADMIN — Medication 2 MILLIGRAM(S): at 12:00

## 2023-11-21 RX ADMIN — PALIPERIDONE 9 MILLIGRAM(S): 1.5 TABLET, EXTENDED RELEASE ORAL at 08:13

## 2023-11-21 RX ADMIN — METFORMIN HYDROCHLORIDE 500 MILLIGRAM(S): 850 TABLET ORAL at 08:13

## 2023-11-21 RX ADMIN — Medication 3 MILLIGRAM(S): at 19:50
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
## 2023-11-21 NOTE — BH INPATIENT PSYCHIATRY PROGRESS NOTE - NSBHFUPINTERVALHXFT_PSY_A_CORE
VSS, accepting medications but declined melatonin, no PRNs for agitation, no acute events.  VSS, accepting medications but declined melatonin, no PRNs for agitation, no acute events.     Patient found sleeping in bed in the late morning. Does awaken easily to voice. States arm is aching from site of injection. Reports feeling "a little tired." Denies issues on the unit. States she had good visit from  last night. Denies muscle stiffness, restlessness, tremor or GI symptoms.

## 2023-11-21 NOTE — BH INPATIENT PSYCHIATRY PROGRESS NOTE - NSBHCHARTREVIEWVS_PSY_A_CORE FT
Vital Signs Last 24 Hrs  T(C): --  T(F): --  HR: --  BP: --  BP(mean): --  RR: --  SpO2: --    Orthostatic VS  11-20-23 @ 08:26  Lying BP: --/-- HR: --  Sitting BP: 112/72 HR: 77  Standing BP: 102/64 HR: 90  Site: --  Mode: --   Vital Signs Last 24 Hrs  T(C): 37.1 (11-21-23 @ 08:41), Max: 37.1 (11-21-23 @ 08:41)  T(F): 98.7 (11-21-23 @ 08:41), Max: 98.7 (11-21-23 @ 08:41)  HR: --  BP: --  BP(mean): --  RR: --  SpO2: --    Orthostatic VS  11-21-23 @ 08:41  Lying BP: --/-- HR: --  Sitting BP: 114/72 HR: 101  Standing BP: 101/65 HR: 109  Site: --  Mode: --  Orthostatic VS  11-20-23 @ 08:26  Lying BP: --/-- HR: --  Sitting BP: 112/72 HR: 77  Standing BP: 102/64 HR: 90  Site: --  Mode: --

## 2023-11-21 NOTE — BH INPATIENT PSYCHIATRY PROGRESS NOTE - NSBHMETABOLIC_PSY_ALL_CORE_FT
BMI:   HbA1c: A1C with Estimated Average Glucose Result: 4.9 % (10-18-23 @ 11:45)    Glucose:   BP: --Vital Signs Last 24 Hrs  T(C): --  T(F): --  HR: --  BP: --  BP(mean): --  RR: --  SpO2: --    Orthostatic VS  11-20-23 @ 08:26  Lying BP: --/-- HR: --  Sitting BP: 112/72 HR: 77  Standing BP: 102/64 HR: 90  Site: --  Mode: --    Lipid Panel: Date/Time: 10-18-23 @ 11:45  Cholesterol, Serum: 123  LDL Cholesterol Calculated: 43  HDL Cholesterol, Serum: 49  Total Cholesterol/HDL Ration Measurement: --  Triglycerides, Serum: 153   BMI:   HbA1c: A1C with Estimated Average Glucose Result: 4.9 % (10-18-23 @ 11:45)    Glucose:   BP: --Vital Signs Last 24 Hrs  T(C): 37.1 (11-21-23 @ 08:41), Max: 37.1 (11-21-23 @ 08:41)  T(F): 98.7 (11-21-23 @ 08:41), Max: 98.7 (11-21-23 @ 08:41)  HR: --  BP: --  BP(mean): --  RR: --  SpO2: --    Orthostatic VS  11-21-23 @ 08:41  Lying BP: --/-- HR: --  Sitting BP: 114/72 HR: 101  Standing BP: 101/65 HR: 109  Site: --  Mode: --  Orthostatic VS  11-20-23 @ 08:26  Lying BP: --/-- HR: --  Sitting BP: 112/72 HR: 77  Standing BP: 102/64 HR: 90  Site: --  Mode: --    Lipid Panel: Date/Time: 10-18-23 @ 11:45  Cholesterol, Serum: 123  LDL Cholesterol Calculated: 43  HDL Cholesterol, Serum: 49  Total Cholesterol/HDL Ration Measurement: --  Triglycerides, Serum: 153

## 2023-11-21 NOTE — BH INPATIENT PSYCHIATRY PROGRESS NOTE - MSE UNSTRUCTURED FT
Appearance: hygiene-intact, casually dressed  Behavior: less guarded and no longer overtly mistrustful, no PMA/PMR  Speech: still somewhat hyperverbal with rapid speech, but interruptable, normal volume  Mood: "good"  Affect: calm, neutral, no irritability   Thought process: largely linear and organized, though becomes disorganized when discussing paranoia  Thought content: not accusatory/paranoid with this MD today, still paranoid towards family   Perceptions: no AH, VH elicited, not internally pre-occupied appearing  Insight: improving, able to acknowledge being paranoid in past, but not that this was the cause of this hospitalization or that medications are helpful  Judgement: fair  Cognition: grossly intact   Gait: intact Appearance: hygiene-intact, casually dressed  Behavior: less guarded and no longer overtly mistrustful, no PMA/PMR  Speech: can still be hyperverbal with rapid speech at times, but interruptable, normal volume  Mood: neutral  Affect: calm, neutral, no irritability   Thought process: largely linear and organized  Thought content: not accusatory/paranoid with this MD today, still paranoid towards family   Perceptions: no AH, VH elicited, not internally pre-occupied appearing  Insight: improving, able to acknowledge being paranoid in past, but not that this was the cause of this hospitalization or that medications are helpful  Judgement: fair  Cognition: grossly intact   Gait: intact

## 2023-11-21 NOTE — BH INPATIENT PSYCHIATRY PROGRESS NOTE - CURRENT MEDICATION
MEDICATIONS  (STANDING):  melatonin. 3 milliGRAM(s) Oral at bedtime  metFORMIN 500 milliGRAM(s) Oral two times a day with meals  nicotine -  14 mG/24Hr(s) Patch 1 Patch Transdermal daily  paliperidone ER 9 milliGRAM(s) Oral daily    MEDICATIONS  (PRN):  acetaminophen     Tablet .. 650 milliGRAM(s) Oral every 6 hours PRN Temp greater or equal to 38C (100.4F), Mild Pain (1 - 3)  LORazepam     Tablet 2 milliGRAM(s) Oral every 6 hours PRN agitation  nicotine  Polacrilex Gum 2 milliGRAM(s) Oral every 2 hours PRN nicotine cravings  OLANZapine 5 milliGRAM(s) Oral every 6 hours PRN agitation  OLANZapine Injectable 5 milliGRAM(s) IntraMuscular daily PRN Refusing court ordered PO medication  OLANZapine Injectable 5 milliGRAM(s) IntraMuscular once PRN severe agitation  traZODone 50 milliGRAM(s) Oral once PRN insomnia   MEDICATIONS  (STANDING):  metFORMIN 500 milliGRAM(s) Oral two times a day with meals  nicotine -  14 mG/24Hr(s) Patch 1 Patch Transdermal daily  paliperidone ER 9 milliGRAM(s) Oral daily    MEDICATIONS  (PRN):  acetaminophen     Tablet .. 650 milliGRAM(s) Oral every 6 hours PRN Temp greater or equal to 38C (100.4F), Mild Pain (1 - 3)  LORazepam     Tablet 2 milliGRAM(s) Oral every 6 hours PRN agitation  melatonin. 3 milliGRAM(s) Oral at bedtime PRN Insomnia  nicotine  Polacrilex Gum 2 milliGRAM(s) Oral every 2 hours PRN nicotine cravings  OLANZapine 5 milliGRAM(s) Oral every 6 hours PRN agitation  OLANZapine Injectable 5 milliGRAM(s) IntraMuscular daily PRN Refusing court ordered PO medication  OLANZapine Injectable 5 milliGRAM(s) IntraMuscular once PRN severe agitation  traZODone 50 milliGRAM(s) Oral once PRN insomnia

## 2023-11-21 NOTE — BH INPATIENT PSYCHIATRY PROGRESS NOTE - NSBHATTESTBILLING_PSY_A_CORE
96372-[id number] OBS or IP - moderate complexity OR 35-49 mins 86142-[id number] OBS or IP - low complexity OR 25-34 mins

## 2023-11-21 NOTE — BH INPATIENT PSYCHIATRY PROGRESS NOTE - PRN MEDS
MEDICATIONS  (PRN):  acetaminophen     Tablet .. 650 milliGRAM(s) Oral every 6 hours PRN Temp greater or equal to 38C (100.4F), Mild Pain (1 - 3)  LORazepam     Tablet 2 milliGRAM(s) Oral every 6 hours PRN agitation  nicotine  Polacrilex Gum 2 milliGRAM(s) Oral every 2 hours PRN nicotine cravings  OLANZapine 5 milliGRAM(s) Oral every 6 hours PRN agitation  OLANZapine Injectable 5 milliGRAM(s) IntraMuscular daily PRN Refusing court ordered PO medication  OLANZapine Injectable 5 milliGRAM(s) IntraMuscular once PRN severe agitation  traZODone 50 milliGRAM(s) Oral once PRN insomnia   MEDICATIONS  (PRN):  acetaminophen     Tablet .. 650 milliGRAM(s) Oral every 6 hours PRN Temp greater or equal to 38C (100.4F), Mild Pain (1 - 3)  LORazepam     Tablet 2 milliGRAM(s) Oral every 6 hours PRN agitation  melatonin. 3 milliGRAM(s) Oral at bedtime PRN Insomnia  nicotine  Polacrilex Gum 2 milliGRAM(s) Oral every 2 hours PRN nicotine cravings  OLANZapine 5 milliGRAM(s) Oral every 6 hours PRN agitation  OLANZapine Injectable 5 milliGRAM(s) IntraMuscular daily PRN Refusing court ordered PO medication  OLANZapine Injectable 5 milliGRAM(s) IntraMuscular once PRN severe agitation  traZODone 50 milliGRAM(s) Oral once PRN insomnia

## 2023-11-22 PROCEDURE — 99231 SBSQ HOSP IP/OBS SF/LOW 25: CPT

## 2023-11-22 RX ORDER — PALIPERIDONE 1.5 MG/1
6 TABLET, EXTENDED RELEASE ORAL DAILY
Refills: 0 | Status: DISCONTINUED | OUTPATIENT
Start: 2023-11-22 | End: 2023-11-24

## 2023-11-22 RX ADMIN — Medication 650 MILLIGRAM(S): at 12:01

## 2023-11-22 RX ADMIN — Medication 650 MILLIGRAM(S): at 19:49

## 2023-11-22 RX ADMIN — PALIPERIDONE 9 MILLIGRAM(S): 1.5 TABLET, EXTENDED RELEASE ORAL at 08:01

## 2023-11-22 RX ADMIN — METFORMIN HYDROCHLORIDE 500 MILLIGRAM(S): 850 TABLET ORAL at 16:27

## 2023-11-22 RX ADMIN — METFORMIN HYDROCHLORIDE 500 MILLIGRAM(S): 850 TABLET ORAL at 08:01

## 2023-11-22 RX ADMIN — Medication 2 MILLIGRAM(S): at 09:51

## 2023-11-22 RX ADMIN — Medication 1 PATCH: at 09:51

## 2023-11-22 RX ADMIN — Medication 3 MILLIGRAM(S): at 20:24

## 2023-11-22 RX ADMIN — Medication 650 MILLIGRAM(S): at 13:00

## 2023-11-22 NOTE — BH INPATIENT PSYCHIATRY PROGRESS NOTE - CURRENT MEDICATION
MEDICATIONS  (STANDING):  metFORMIN 500 milliGRAM(s) Oral two times a day with meals  nicotine -  14 mG/24Hr(s) Patch 1 Patch Transdermal daily  paliperidone ER 9 milliGRAM(s) Oral daily    MEDICATIONS  (PRN):  acetaminophen     Tablet .. 650 milliGRAM(s) Oral every 6 hours PRN Temp greater or equal to 38C (100.4F), Mild Pain (1 - 3)  LORazepam     Tablet 2 milliGRAM(s) Oral every 6 hours PRN agitation  melatonin. 3 milliGRAM(s) Oral at bedtime PRN Insomnia  nicotine  Polacrilex Gum 2 milliGRAM(s) Oral every 2 hours PRN nicotine cravings  OLANZapine 5 milliGRAM(s) Oral every 6 hours PRN agitation  OLANZapine Injectable 5 milliGRAM(s) IntraMuscular once PRN severe agitation  OLANZapine Injectable 5 milliGRAM(s) IntraMuscular daily PRN Refusing court ordered PO medication  traZODone 50 milliGRAM(s) Oral once PRN insomnia   MEDICATIONS  (STANDING):  metFORMIN 500 milliGRAM(s) Oral two times a day with meals  nicotine -  14 mG/24Hr(s) Patch 1 Patch Transdermal daily  paliperidone ER 6 milliGRAM(s) Oral daily    MEDICATIONS  (PRN):  acetaminophen     Tablet .. 650 milliGRAM(s) Oral every 6 hours PRN Temp greater or equal to 38C (100.4F), Mild Pain (1 - 3)  LORazepam     Tablet 2 milliGRAM(s) Oral every 6 hours PRN agitation  melatonin. 3 milliGRAM(s) Oral at bedtime PRN Insomnia  nicotine  Polacrilex Gum 2 milliGRAM(s) Oral every 2 hours PRN nicotine cravings  OLANZapine 5 milliGRAM(s) Oral every 6 hours PRN agitation  OLANZapine Injectable 5 milliGRAM(s) IntraMuscular daily PRN Refusing court ordered PO medication  OLANZapine Injectable 5 milliGRAM(s) IntraMuscular once PRN severe agitation  propranolol 10 milliGRAM(s) Oral three times a day PRN Restlessness  traZODone 50 milliGRAM(s) Oral once PRN insomnia

## 2023-11-22 NOTE — BH INPATIENT PSYCHIATRY PROGRESS NOTE - MSE UNSTRUCTURED FT
Appearance: hygiene-intact, casually dressed  Behavior: less guarded and no longer overtly mistrustful, no PMA/PMR  Speech: can still be hyperverbal with rapid speech at times, but interruptable, normal volume  Mood: neutral  Affect: calm, neutral, no irritability   Thought process: largely linear and organized  Thought content: not accusatory/paranoid with this MD today, still paranoid towards family   Perceptions: no AH, VH elicited, not internally pre-occupied appearing  Insight: improving, able to acknowledge being paranoid in past, but not that this was the cause of this hospitalization or that medications are helpful  Judgement: fair  Cognition: grossly intact   Gait: intact Appearance: hygiene-intact, casually dressed  Behavior: less guarded and no longer overtly mistrustful, no PMA/PMR  Speech: normal rate and volume  Mood: neutral  Affect: calm, neutral, no irritability   Thought process: largely linear and organized  Thought content: not accusatory/paranoid with this MD today, still paranoid towards family   Perceptions: no AH, VH elicited, not internally pre-occupied appearing  Insight: improving, able to acknowledge being paranoid in past, but not that this was the cause of this hospitalization or that medications are helpful  Judgement: fair  Cognition: grossly intact   Gait: intact

## 2023-11-22 NOTE — BH TREATMENT PLAN - NSTXPSYCHOINTERPR_PSY_ALL_CORE
Over the past 7 days, the pt. has made some progress towards established psych rehab goal as she has found coloring to be a coping skills that she can utilize. Over the next seven days, Psychiatric rehabilitation staff will continue to provide encouragement, support, psychotherapy, and psychoeducation to assist the patient in the progression of established treatment goal.

## 2023-11-22 NOTE — BH INPATIENT PSYCHIATRY PROGRESS NOTE - PRN MEDS
MEDICATIONS  (PRN):  acetaminophen     Tablet .. 650 milliGRAM(s) Oral every 6 hours PRN Temp greater or equal to 38C (100.4F), Mild Pain (1 - 3)  LORazepam     Tablet 2 milliGRAM(s) Oral every 6 hours PRN agitation  melatonin. 3 milliGRAM(s) Oral at bedtime PRN Insomnia  nicotine  Polacrilex Gum 2 milliGRAM(s) Oral every 2 hours PRN nicotine cravings  OLANZapine 5 milliGRAM(s) Oral every 6 hours PRN agitation  OLANZapine Injectable 5 milliGRAM(s) IntraMuscular once PRN severe agitation  OLANZapine Injectable 5 milliGRAM(s) IntraMuscular daily PRN Refusing court ordered PO medication  traZODone 50 milliGRAM(s) Oral once PRN insomnia   MEDICATIONS  (PRN):  acetaminophen     Tablet .. 650 milliGRAM(s) Oral every 6 hours PRN Temp greater or equal to 38C (100.4F), Mild Pain (1 - 3)  LORazepam     Tablet 2 milliGRAM(s) Oral every 6 hours PRN agitation  melatonin. 3 milliGRAM(s) Oral at bedtime PRN Insomnia  nicotine  Polacrilex Gum 2 milliGRAM(s) Oral every 2 hours PRN nicotine cravings  OLANZapine 5 milliGRAM(s) Oral every 6 hours PRN agitation  OLANZapine Injectable 5 milliGRAM(s) IntraMuscular daily PRN Refusing court ordered PO medication  OLANZapine Injectable 5 milliGRAM(s) IntraMuscular once PRN severe agitation  propranolol 10 milliGRAM(s) Oral three times a day PRN Restlessness  traZODone 50 milliGRAM(s) Oral once PRN insomnia
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
## 2023-11-22 NOTE — BH INPATIENT PSYCHIATRY PROGRESS NOTE - NSBHCHARTREVIEWVS_PSY_A_CORE FT
Vital Signs Last 24 Hrs  T(C): 36.8 (11-22-23 @ 07:43), Max: 37.1 (11-21-23 @ 08:41)  T(F): 98.3 (11-22-23 @ 07:43), Max: 98.7 (11-21-23 @ 08:41)  HR: --  BP: --  BP(mean): --  RR: --  SpO2: --    Orthostatic VS  11-22-23 @ 07:43  Lying BP: --/-- HR: --  Sitting BP: 123/70 HR: 97  Standing BP: 116/75 HR: 109  Site: --  Mode: --  Orthostatic VS  11-21-23 @ 08:41  Lying BP: --/-- HR: --  Sitting BP: 114/72 HR: 101  Standing BP: 101/65 HR: 109  Site: --  Mode: --   Vital Signs Last 24 Hrs  T(C): 36.8 (11-22-23 @ 07:43), Max: 36.8 (11-22-23 @ 07:43)  T(F): 98.3 (11-22-23 @ 07:43), Max: 98.3 (11-22-23 @ 07:43)  HR: --  BP: --  BP(mean): --  RR: --  SpO2: --    Orthostatic VS  11-22-23 @ 07:43  Lying BP: --/-- HR: --  Sitting BP: 123/70 HR: 97  Standing BP: 116/75 HR: 109  Site: --  Mode: --  Orthostatic VS  11-21-23 @ 08:41  Lying BP: --/-- HR: --  Sitting BP: 114/72 HR: 101  Standing BP: 101/65 HR: 109  Site: --  Mode: --
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
## 2023-11-22 NOTE — BH INPATIENT PSYCHIATRY PROGRESS NOTE - NSBHMETABOLIC_PSY_ALL_CORE_FT
BMI:   HbA1c: A1C with Estimated Average Glucose Result: 4.9 % (10-18-23 @ 11:45)    Glucose:   BP: --Vital Signs Last 24 Hrs  T(C): 36.8 (11-22-23 @ 07:43), Max: 37.1 (11-21-23 @ 08:41)  T(F): 98.3 (11-22-23 @ 07:43), Max: 98.7 (11-21-23 @ 08:41)  HR: --  BP: --  BP(mean): --  RR: --  SpO2: --    Orthostatic VS  11-22-23 @ 07:43  Lying BP: --/-- HR: --  Sitting BP: 123/70 HR: 97  Standing BP: 116/75 HR: 109  Site: --  Mode: --  Orthostatic VS  11-21-23 @ 08:41  Lying BP: --/-- HR: --  Sitting BP: 114/72 HR: 101  Standing BP: 101/65 HR: 109  Site: --  Mode: --    Lipid Panel: Date/Time: 10-18-23 @ 11:45  Cholesterol, Serum: 123  LDL Cholesterol Calculated: 43  HDL Cholesterol, Serum: 49  Total Cholesterol/HDL Ration Measurement: --  Triglycerides, Serum: 153   BMI:   HbA1c: A1C with Estimated Average Glucose Result: 4.9 % (10-18-23 @ 11:45)    Glucose:   BP: --Vital Signs Last 24 Hrs  T(C): 36.8 (11-22-23 @ 07:43), Max: 36.8 (11-22-23 @ 07:43)  T(F): 98.3 (11-22-23 @ 07:43), Max: 98.3 (11-22-23 @ 07:43)  HR: --  BP: --  BP(mean): --  RR: --  SpO2: --    Orthostatic VS  11-22-23 @ 07:43  Lying BP: --/-- HR: --  Sitting BP: 123/70 HR: 97  Standing BP: 116/75 HR: 109  Site: --  Mode: --  Orthostatic VS  11-21-23 @ 08:41  Lying BP: --/-- HR: --  Sitting BP: 114/72 HR: 101  Standing BP: 101/65 HR: 109  Site: --  Mode: --    Lipid Panel: Date/Time: 10-18-23 @ 11:45  Cholesterol, Serum: 123  LDL Cholesterol Calculated: 43  HDL Cholesterol, Serum: 49  Total Cholesterol/HDL Ration Measurement: --  Triglycerides, Serum: 153

## 2023-11-23 RX ADMIN — PALIPERIDONE 6 MILLIGRAM(S): 1.5 TABLET, EXTENDED RELEASE ORAL at 08:02

## 2023-11-23 RX ADMIN — Medication 2 MILLIGRAM(S): at 08:01

## 2023-11-23 RX ADMIN — Medication 1 PATCH: at 08:02

## 2023-11-23 RX ADMIN — METFORMIN HYDROCHLORIDE 500 MILLIGRAM(S): 850 TABLET ORAL at 08:02

## 2023-11-24 PROCEDURE — 99232 SBSQ HOSP IP/OBS MODERATE 35: CPT

## 2023-11-24 RX ORDER — PALIPERIDONE 1.5 MG/1
3 TABLET, EXTENDED RELEASE ORAL DAILY
Refills: 0 | Status: DISCONTINUED | OUTPATIENT
Start: 2023-11-25 | End: 2023-11-27

## 2023-11-24 RX ADMIN — Medication 1 PATCH: at 07:16

## 2023-11-24 RX ADMIN — PALIPERIDONE 6 MILLIGRAM(S): 1.5 TABLET, EXTENDED RELEASE ORAL at 08:21

## 2023-11-24 RX ADMIN — Medication 1 PATCH: at 19:53

## 2023-11-24 RX ADMIN — METFORMIN HYDROCHLORIDE 500 MILLIGRAM(S): 850 TABLET ORAL at 08:21

## 2023-11-24 RX ADMIN — Medication 1 PATCH: at 08:20

## 2023-11-24 NOTE — BH INPATIENT PSYCHIATRY PROGRESS NOTE - PRN MEDS
MEDICATIONS  (PRN):  acetaminophen     Tablet .. 650 milliGRAM(s) Oral every 6 hours PRN Temp greater or equal to 38C (100.4F), Mild Pain (1 - 3)  LORazepam     Tablet 2 milliGRAM(s) Oral every 6 hours PRN agitation  melatonin. 3 milliGRAM(s) Oral at bedtime PRN Insomnia  nicotine  Polacrilex Gum 2 milliGRAM(s) Oral every 2 hours PRN nicotine cravings  OLANZapine 5 milliGRAM(s) Oral every 6 hours PRN agitation  OLANZapine Injectable 5 milliGRAM(s) IntraMuscular once PRN severe agitation  OLANZapine Injectable 5 milliGRAM(s) IntraMuscular daily PRN Refusing court ordered PO medication  propranolol 10 milliGRAM(s) Oral three times a day PRN Restlessness  traZODone 50 milliGRAM(s) Oral once PRN insomnia

## 2023-11-24 NOTE — BH INPATIENT PSYCHIATRY PROGRESS NOTE - NSBHFUPINTERVALHXFT_PSY_A_CORE
Accepting medications, no PRNs for agitation, no acute events. Patient reports she is doing well. She had some anxiety about receiving the Rodrigo, but states that has resolved after education was provided by the clinical team.  She denies any pain from first SHELTON.  She denies any depression.  Denies SI, HI.  Behavior well controlled.  She denies any further restlessness.  She has been visible on the unit, attending some groups. She has been compliant with medications, tolerating them well.

## 2023-11-24 NOTE — BH INPATIENT PSYCHIATRY PROGRESS NOTE - NSBHCHARTREVIEWVS_PSY_A_CORE FT
Vital Signs Last 24 Hrs  T(C): 36.3 (11-24-23 @ 07:47), Max: 36.3 (11-24-23 @ 07:47)  T(F): 97.3 (11-24-23 @ 07:47), Max: 97.3 (11-24-23 @ 07:47)  HR: --  BP: --  BP(mean): --  RR: --  SpO2: --    Orthostatic VS  11-24-23 @ 07:47  Lying BP: --/-- HR: --  Sitting BP: 111/75 HR: 73  Standing BP: 104/69 HR: 96  Site: --  Mode: --  Orthostatic VS  11-23-23 @ 08:29  Lying BP: --/-- HR: --  Sitting BP: 103/74 HR: 94  Standing BP: 105/89 HR: 111  Site: --  Mode: --

## 2023-11-24 NOTE — BH INPATIENT PSYCHIATRY PROGRESS NOTE - CURRENT MEDICATION
MEDICATIONS  (STANDING):  metFORMIN 500 milliGRAM(s) Oral two times a day with meals  nicotine -  14 mG/24Hr(s) Patch 1 Patch Transdermal daily  paliperidone ER 6 milliGRAM(s) Oral daily    MEDICATIONS  (PRN):  acetaminophen     Tablet .. 650 milliGRAM(s) Oral every 6 hours PRN Temp greater or equal to 38C (100.4F), Mild Pain (1 - 3)  LORazepam     Tablet 2 milliGRAM(s) Oral every 6 hours PRN agitation  melatonin. 3 milliGRAM(s) Oral at bedtime PRN Insomnia  nicotine  Polacrilex Gum 2 milliGRAM(s) Oral every 2 hours PRN nicotine cravings  OLANZapine 5 milliGRAM(s) Oral every 6 hours PRN agitation  OLANZapine Injectable 5 milliGRAM(s) IntraMuscular once PRN severe agitation  OLANZapine Injectable 5 milliGRAM(s) IntraMuscular daily PRN Refusing court ordered PO medication  propranolol 10 milliGRAM(s) Oral three times a day PRN Restlessness  traZODone 50 milliGRAM(s) Oral once PRN insomnia

## 2023-11-24 NOTE — BH INPATIENT PSYCHIATRY PROGRESS NOTE - NSBHMETABOLIC_PSY_ALL_CORE_FT
BMI:   HbA1c: A1C with Estimated Average Glucose Result: 4.9 % (10-18-23 @ 11:45)    Glucose:   BP: --Vital Signs Last 24 Hrs  T(C): 36.3 (11-24-23 @ 07:47), Max: 36.3 (11-24-23 @ 07:47)  T(F): 97.3 (11-24-23 @ 07:47), Max: 97.3 (11-24-23 @ 07:47)  HR: --  BP: --  BP(mean): --  RR: --  SpO2: --    Orthostatic VS  11-24-23 @ 07:47  Lying BP: --/-- HR: --  Sitting BP: 111/75 HR: 73  Standing BP: 104/69 HR: 96  Site: --  Mode: --  Orthostatic VS  11-23-23 @ 08:29  Lying BP: --/-- HR: --  Sitting BP: 103/74 HR: 94  Standing BP: 105/89 HR: 111  Site: --  Mode: --    Lipid Panel: Date/Time: 10-18-23 @ 11:45  Cholesterol, Serum: 123  LDL Cholesterol Calculated: 43  HDL Cholesterol, Serum: 49  Total Cholesterol/HDL Ration Measurement: --  Triglycerides, Serum: 153

## 2023-11-24 NOTE — BH INPATIENT PSYCHIATRY PROGRESS NOTE - MSE UNSTRUCTURED FT
Appearance: hygiene-intact, casually dressed  Behavior: less guarded and no longer overtly mistrustful, no PMA/PMR  Speech: normal rate and volume  Mood: "okay"  Affect: calm, neutral, no irritability   Thought process: largely linear and organized  Thought content: not accusatory/paranoid with this MD today, still paranoid towards family   Perceptions: no AH, VH elicited, not internally pre-occupied appearing  Insight: improving, able to acknowledge being paranoid in past, but not that this was the cause of this hospitalization or that medications are helpful  Judgement: fair  Cognition: grossly intact   Gait: intact
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
## 2023-11-25 RX ADMIN — Medication 1 PATCH: at 08:06

## 2023-11-25 RX ADMIN — PALIPERIDONE 3 MILLIGRAM(S): 1.5 TABLET, EXTENDED RELEASE ORAL at 08:03

## 2023-11-25 RX ADMIN — METFORMIN HYDROCHLORIDE 500 MILLIGRAM(S): 850 TABLET ORAL at 08:03

## 2023-11-25 RX ADMIN — Medication 1 PATCH: at 20:13

## 2023-11-25 RX ADMIN — METFORMIN HYDROCHLORIDE 500 MILLIGRAM(S): 850 TABLET ORAL at 16:38

## 2023-11-26 RX ADMIN — METFORMIN HYDROCHLORIDE 500 MILLIGRAM(S): 850 TABLET ORAL at 16:42

## 2023-11-26 RX ADMIN — Medication 1 PATCH: at 08:31

## 2023-11-26 RX ADMIN — PALIPERIDONE 3 MILLIGRAM(S): 1.5 TABLET, EXTENDED RELEASE ORAL at 08:31

## 2023-11-26 RX ADMIN — Medication 3 MILLIGRAM(S): at 23:45

## 2023-11-26 RX ADMIN — METFORMIN HYDROCHLORIDE 500 MILLIGRAM(S): 850 TABLET ORAL at 08:31

## 2023-11-27 PROBLEM — Z78.9 OTHER SPECIFIED HEALTH STATUS: Chronic | Status: ACTIVE | Noted: 2023-08-05

## 2023-11-27 PROCEDURE — 99232 SBSQ HOSP IP/OBS MODERATE 35: CPT

## 2023-11-27 PROCEDURE — 90853 GROUP PSYCHOTHERAPY: CPT

## 2023-11-27 RX ORDER — PALIPERIDONE 1.5 MG/1
156 TABLET, EXTENDED RELEASE ORAL ONCE
Refills: 0 | Status: COMPLETED | OUTPATIENT
Start: 2023-11-27 | End: 2023-11-27

## 2023-11-27 RX ADMIN — METFORMIN HYDROCHLORIDE 500 MILLIGRAM(S): 850 TABLET ORAL at 08:28

## 2023-11-27 RX ADMIN — Medication 1 PATCH: at 08:27

## 2023-11-27 RX ADMIN — METFORMIN HYDROCHLORIDE 500 MILLIGRAM(S): 850 TABLET ORAL at 16:39

## 2023-11-27 RX ADMIN — Medication 2 MILLIGRAM(S): at 20:31

## 2023-11-27 RX ADMIN — Medication 2 MILLIGRAM(S): at 08:37

## 2023-11-27 RX ADMIN — PALIPERIDONE 3 MILLIGRAM(S): 1.5 TABLET, EXTENDED RELEASE ORAL at 08:28

## 2023-11-27 RX ADMIN — Medication 2 MILLIGRAM(S): at 11:37

## 2023-11-27 RX ADMIN — PALIPERIDONE 156 MILLIGRAM(S): 1.5 TABLET, EXTENDED RELEASE ORAL at 15:23

## 2023-11-27 RX ADMIN — Medication 2 MILLIGRAM(S): at 00:08

## 2023-11-27 NOTE — BH INPATIENT PSYCHIATRY PROGRESS NOTE - PRN MEDS
MEDICATIONS  (PRN):  acetaminophen     Tablet .. 650 milliGRAM(s) Oral every 6 hours PRN Temp greater or equal to 38C (100.4F), Mild Pain (1 - 3)  LORazepam     Tablet 2 milliGRAM(s) Oral every 6 hours PRN agitation  melatonin. 3 milliGRAM(s) Oral at bedtime PRN Insomnia  nicotine  Polacrilex Gum 2 milliGRAM(s) Oral every 2 hours PRN nicotine cravings  OLANZapine 5 milliGRAM(s) Oral every 6 hours PRN agitation  OLANZapine Injectable 5 milliGRAM(s) IntraMuscular daily PRN Refusing court ordered PO medication  OLANZapine Injectable 5 milliGRAM(s) IntraMuscular once PRN severe agitation  propranolol 10 milliGRAM(s) Oral three times a day PRN Restlessness  traZODone 50 milliGRAM(s) Oral once PRN insomnia   MEDICATIONS  (PRN):  acetaminophen     Tablet .. 650 milliGRAM(s) Oral every 6 hours PRN Temp greater or equal to 38C (100.4F), Mild Pain (1 - 3)  LORazepam     Tablet 2 milliGRAM(s) Oral every 6 hours PRN agitation  melatonin. 3 milliGRAM(s) Oral at bedtime PRN Insomnia  nicotine  Polacrilex Gum 2 milliGRAM(s) Oral every 2 hours PRN nicotine cravings  OLANZapine 5 milliGRAM(s) Oral every 6 hours PRN agitation  OLANZapine Injectable 5 milliGRAM(s) IntraMuscular once PRN severe agitation  OLANZapine Injectable 5 milliGRAM(s) IntraMuscular daily PRN Refusing court ordered PO medication  propranolol 10 milliGRAM(s) Oral three times a day PRN Restlessness  traZODone 50 milliGRAM(s) Oral once PRN insomnia

## 2023-11-27 NOTE — BH INPATIENT PSYCHIATRY PROGRESS NOTE - NSBHFUPINTERVALHXFT_PSY_A_CORE
Intermittently tachycardic to 100s standing, accepting medications, no PRNs for agitation, no acute events over weekend  Intermittently tachycardic to 100s standing, accepting medications, no PRNs for agitation, no acute events over weekend. Slept poorly last night (3ish hours per log), but adequately other days over weekend.    Patient reports she is doing good. Notes turkey they served Thursday was weird. States arm pain from injection has resolved. Denies muscle stiffness, tremor, restlessness (resolved from last week). States visits from family been going well. Somewhat worried about brother in law being upset with her, because she called [employer], but he lives in [facility]. Denies SI, HI.
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
## 2023-11-27 NOTE — BH INPATIENT PSYCHIATRY PROGRESS NOTE - CURRENT MEDICATION
MEDICATIONS  (STANDING):  metFORMIN 500 milliGRAM(s) Oral two times a day with meals  nicotine -  14 mG/24Hr(s) Patch 1 Patch Transdermal daily  paliperidone ER 3 milliGRAM(s) Oral daily    MEDICATIONS  (PRN):  acetaminophen     Tablet .. 650 milliGRAM(s) Oral every 6 hours PRN Temp greater or equal to 38C (100.4F), Mild Pain (1 - 3)  LORazepam     Tablet 2 milliGRAM(s) Oral every 6 hours PRN agitation  melatonin. 3 milliGRAM(s) Oral at bedtime PRN Insomnia  nicotine  Polacrilex Gum 2 milliGRAM(s) Oral every 2 hours PRN nicotine cravings  OLANZapine 5 milliGRAM(s) Oral every 6 hours PRN agitation  OLANZapine Injectable 5 milliGRAM(s) IntraMuscular daily PRN Refusing court ordered PO medication  OLANZapine Injectable 5 milliGRAM(s) IntraMuscular once PRN severe agitation  propranolol 10 milliGRAM(s) Oral three times a day PRN Restlessness  traZODone 50 milliGRAM(s) Oral once PRN insomnia   MEDICATIONS  (STANDING):  metFORMIN 500 milliGRAM(s) Oral two times a day with meals  nicotine -  14 mG/24Hr(s) Patch 1 Patch Transdermal daily  paliperidone Injectable, Long Acting 156 milliGRAM(s) IntraMuscular once    MEDICATIONS  (PRN):  acetaminophen     Tablet .. 650 milliGRAM(s) Oral every 6 hours PRN Temp greater or equal to 38C (100.4F), Mild Pain (1 - 3)  LORazepam     Tablet 2 milliGRAM(s) Oral every 6 hours PRN agitation  melatonin. 3 milliGRAM(s) Oral at bedtime PRN Insomnia  nicotine  Polacrilex Gum 2 milliGRAM(s) Oral every 2 hours PRN nicotine cravings  OLANZapine 5 milliGRAM(s) Oral every 6 hours PRN agitation  OLANZapine Injectable 5 milliGRAM(s) IntraMuscular once PRN severe agitation  OLANZapine Injectable 5 milliGRAM(s) IntraMuscular daily PRN Refusing court ordered PO medication  propranolol 10 milliGRAM(s) Oral three times a day PRN Restlessness  traZODone 50 milliGRAM(s) Oral once PRN insomnia   MEDICATIONS  (STANDING):  metFORMIN 500 milliGRAM(s) Oral two times a day with meals  nicotine -  14 mG/24Hr(s) Patch 1 Patch Transdermal daily  paliperidone Injectable, Long Acting 156 milliGRAM(s) IntraMuscular once    MEDICATIONS  (PRN):  acetaminophen     Tablet .. 650 milliGRAM(s) Oral every 6 hours PRN Temp greater or equal to 38C (100.4F), Mild Pain (1 - 3)  LORazepam     Tablet 2 milliGRAM(s) Oral every 6 hours PRN agitation  melatonin. 3 milliGRAM(s) Oral at bedtime PRN Insomnia  nicotine  Polacrilex Gum 2 milliGRAM(s) Oral every 2 hours PRN nicotine cravings  OLANZapine 5 milliGRAM(s) Oral every 6 hours PRN agitation  OLANZapine Injectable 5 milliGRAM(s) IntraMuscular daily PRN Refusing court ordered PO medication  OLANZapine Injectable 5 milliGRAM(s) IntraMuscular once PRN severe agitation  propranolol 10 milliGRAM(s) Oral three times a day PRN Restlessness  traZODone 50 milliGRAM(s) Oral once PRN insomnia

## 2023-11-27 NOTE — BH INPATIENT PSYCHIATRY PROGRESS NOTE - NSBHMETABOLIC_PSY_ALL_CORE_FT
BMI:   HbA1c: A1C with Estimated Average Glucose Result: 4.9 % (10-18-23 @ 11:45)    Glucose:   BP: --Vital Signs Last 24 Hrs  T(C): 36.5 (11-27-23 @ 06:51), Max: 36.5 (11-27-23 @ 06:51)  T(F): 97.7 (11-27-23 @ 06:51), Max: 97.7 (11-27-23 @ 06:51)  HR: --  BP: --  BP(mean): --  RR: --  SpO2: --    Orthostatic VS  11-27-23 @ 06:51  Lying BP: --/-- HR: --  Sitting BP: 107/76 HR: 82  Standing BP: --/-- HR: --  Site: --  Mode: --  Orthostatic VS  11-26-23 @ 08:42  Lying BP: --/-- HR: --  Sitting BP: 101/62 HR: 72  Standing BP: 107/66 HR: 76  Site: --  Mode: --  Orthostatic VS  11-25-23 @ 08:41  Lying BP: --/-- HR: --  Sitting BP: 106/77 HR: 77  Standing BP: 91/71 HR: 109  Site: --  Mode: --    Lipid Panel: Date/Time: 10-18-23 @ 11:45  Cholesterol, Serum: 123  LDL Cholesterol Calculated: 43  HDL Cholesterol, Serum: 49  Total Cholesterol/HDL Ration Measurement: --  Triglycerides, Serum: 153   BMI:   HbA1c: A1C with Estimated Average Glucose Result: 4.9 % (10-18-23 @ 11:45)    Glucose:   BP: --Vital Signs Last 24 Hrs  T(C): 36.5 (11-27-23 @ 06:51), Max: 36.5 (11-27-23 @ 06:51)  T(F): 97.7 (11-27-23 @ 06:51), Max: 97.7 (11-27-23 @ 06:51)  HR: --  BP: --  BP(mean): --  RR: --  SpO2: --    Orthostatic VS  11-27-23 @ 06:51  Lying BP: --/-- HR: --  Sitting BP: 107/76 HR: 82  Standing BP: --/-- HR: --  Site: --  Mode: --  Orthostatic VS  11-26-23 @ 08:42  Lying BP: --/-- HR: --  Sitting BP: 101/62 HR: 72  Standing BP: 107/66 HR: 76  Site: --  Mode: --    Lipid Panel: Date/Time: 10-18-23 @ 11:45  Cholesterol, Serum: 123  LDL Cholesterol Calculated: 43  HDL Cholesterol, Serum: 49  Total Cholesterol/HDL Ration Measurement: --  Triglycerides, Serum: 153

## 2023-11-27 NOTE — BH INPATIENT PSYCHIATRY PROGRESS NOTE - MSE UNSTRUCTURED FT
Appearance: hygiene-intact, casually dressed  Behavior: less guarded and no longer overtly mistrustful, no PMA/PMR  Speech: normal rate and volume  Mood: "okay"  Affect: calm, neutral, no irritability   Thought process: largely linear and organized  Thought content: not accusatory/paranoid with this MD today, still paranoid towards family   Perceptions: no AH, VH elicited, not internally pre-occupied appearing  Insight: improving, able to acknowledge being paranoid in past, but not that this was the cause of this hospitalization or that medications are helpful  Judgement: fair  Cognition: grossly intact   Gait: intact Appearance: hygiene-intact, casually dressed  Behavior: less guarded and no longer overtly mistrustful, no PMA/PMR  Speech: normal rate and volume  Mood: "good"  Affect: calm, neutral, no irritability   Thought process: largely linear and organized  Thought content: not accusatory/paranoid with this MD, still paranoid towards family though more mild   Perceptions: no AH, VH elicited, not internally pre-occupied appearing  Insight: improving  Judgement: fair  Cognition: grossly intact   Gait: intact

## 2023-11-27 NOTE — BH INPATIENT PSYCHIATRY PROGRESS NOTE - NSBHCHARTREVIEWVS_PSY_A_CORE FT
Vital Signs Last 24 Hrs  T(C): 36.5 (11-27-23 @ 06:51), Max: 36.5 (11-27-23 @ 06:51)  T(F): 97.7 (11-27-23 @ 06:51), Max: 97.7 (11-27-23 @ 06:51)  HR: --  BP: --  BP(mean): --  RR: --  SpO2: --    Orthostatic VS  11-27-23 @ 06:51  Lying BP: --/-- HR: --  Sitting BP: 107/76 HR: 82  Standing BP: --/-- HR: --  Site: --  Mode: --  Orthostatic VS  11-26-23 @ 08:42  Lying BP: --/-- HR: --  Sitting BP: 101/62 HR: 72  Standing BP: 107/66 HR: 76  Site: --  Mode: --  Orthostatic VS  11-25-23 @ 08:41  Lying BP: --/-- HR: --  Sitting BP: 106/77 HR: 77  Standing BP: 91/71 HR: 109  Site: --  Mode: --   Vital Signs Last 24 Hrs  T(C): 36.5 (11-27-23 @ 06:51), Max: 36.5 (11-27-23 @ 06:51)  T(F): 97.7 (11-27-23 @ 06:51), Max: 97.7 (11-27-23 @ 06:51)  HR: --  BP: --  BP(mean): --  RR: --  SpO2: --    Orthostatic VS  11-27-23 @ 06:51  Lying BP: --/-- HR: --  Sitting BP: 107/76 HR: 82  Standing BP: --/-- HR: --  Site: --  Mode: --  Orthostatic VS  11-26-23 @ 08:42  Lying BP: --/-- HR: --  Sitting BP: 101/62 HR: 72  Standing BP: 107/66 HR: 76  Site: --  Mode: --

## 2023-11-28 PROCEDURE — 99231 SBSQ HOSP IP/OBS SF/LOW 25: CPT

## 2023-11-28 PROCEDURE — 90853 GROUP PSYCHOTHERAPY: CPT

## 2023-11-28 RX ADMIN — METFORMIN HYDROCHLORIDE 500 MILLIGRAM(S): 850 TABLET ORAL at 16:16

## 2023-11-28 RX ADMIN — Medication 650 MILLIGRAM(S): at 21:00

## 2023-11-28 RX ADMIN — METFORMIN HYDROCHLORIDE 500 MILLIGRAM(S): 850 TABLET ORAL at 08:17

## 2023-11-28 RX ADMIN — Medication 1 PATCH: at 19:44

## 2023-11-28 RX ADMIN — Medication 650 MILLIGRAM(S): at 19:57

## 2023-11-28 RX ADMIN — Medication 1 PATCH: at 08:22

## 2023-11-28 RX ADMIN — Medication 2 MILLIGRAM(S): at 11:54
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
## 2023-11-28 NOTE — BH PSYCHOLOGY - GROUP THERAPY NOTE - NSPSYCHOLGRPGENPROB_PSY_A_CORE
academic/vocational/social dysfunction/anxiety/depression
aggression/anger/irritability/anxiety/attention problems/depression/impulsivity
academic/vocational/social dysfunction/anxiety/depression

## 2023-11-28 NOTE — BH PSYCHOLOGY - GROUP THERAPY NOTE - NSPSYCHOLGRPGENGOAL_PSY_A_CORE
assessment/decrease symptoms/improve level of independent functioning/improve social/vocational/coping skills/psychoeducation/treatment compliance
assessment/decrease symptoms/improve level of independent functioning/improve social/vocational/coping skills/psychoeducation
improve social/vocational/coping skills/psychoeducation
assessment/decrease symptoms/improve level of independent functioning/improve social/vocational/coping skills/psychoeducation/treatment compliance
assessment/decrease symptoms/improve level of independent functioning/improve social/vocational/coping skills/psychoeducation/treatment compliance
assessment/decrease symptoms/improve level of independent functioning/improve social/vocational/coping skills/psychoeducation
assessment/decrease symptoms/improve level of independent functioning/improve social/vocational/coping skills/psychoeducation/treatment compliance
assessment/decrease symptoms/improve level of independent functioning/improve social/vocational/coping skills/psychoeducation
abstinence/assessment/decrease symptoms/improve level of independent functioning/improve social/vocational/coping skills/psychoeducation/treatment compliance

## 2023-11-28 NOTE — BH PSYCHOLOGY - GROUP THERAPY NOTE - NSPSYCHOLGRPGENINT_PSY_A_CORE
cognitive/behavioral therapy/problem solving techniques discussed/stress management/supported coping skills/supportive therapy/social skills training
cognitive/behavioral therapy/problem solving techniques discussed/stress management/supported coping skills/supportive therapy/treatment compliance encouraged/social skills training
cognitive/behavioral therapy/dialectical behavior therapy (dbt)
cognitive/behavioral therapy/problem solving techniques discussed/stress management/supported coping skills/supportive therapy/treatment compliance encouraged/social skills training
cognitive/behavioral therapy/problem solving techniques discussed/stress management/supported coping skills/supportive therapy/treatment compliance encouraged/social skills training

## 2023-11-28 NOTE — BH PSYCHOLOGY - GROUP THERAPY NOTE - NSBHPSYCHOLGRPNAME_PSY_A_CORE
CBT (Cognitive Behavioral Therapy) Group

## 2023-11-28 NOTE — BH PSYCHOLOGY - GROUP THERAPY NOTE - NSBHPSYCHOLRESPONSE_PSY_A_CORE
Symptoms reduced/Coping skills acquired/Accepted support
Coping skills acquired/Accepted support
Coping skills acquired/Insight displayed/Accepted support
Symptoms reduced/Accepted support
Accepted support
Symptoms reduced/Coping skills acquired/Accepted support
Coping skills acquired/Accepted support
Symptoms reduced/Coping skills acquired/Accepted support
Accepted support
Symptoms reduced/Accepted support
Symptoms reduced/Coping skills acquired/Accepted support
Coping skills acquired/Insight displayed/Accepted support

## 2023-11-28 NOTE — BH PSYCHOLOGY - GROUP THERAPY NOTE - NSBHPSYCHOLGRPNUM_PSY_A_CORE
11
8
8
10
12
7
9
10
Patient attended the psychology cognitive-behavior therapy group. The discussion was focused on the topic of Relaxation Techniques. Group expectations, guidelines, confidentiality (as well as its limitations) were reviewed. The group began with a description of relaxation techniques and what the benefits of these practices are. Group members then learned how to use diaphragmatic breathing to increase relaxation. The group members then discussed when using this skill would be helpful. Discussion stemmed from the group’s connection between thoughts, feelings, and behaviors. Group members demonstrated their understanding through active participation, discussion, and by asking clarifying questions. Members were also provided with a handout describing the concepts and strategies discussed during group. 
12
10
7

## 2023-11-28 NOTE — BH PSYCHOLOGY - GROUP THERAPY NOTE - NSPSYCHOLGRPBILLING_PSY_A_CORE
[id number] - Group Psychotherapy
[id number] - Group Psychotherapy
[id number] - Group Psychotherapy
[id number] - Group Psychotherapy
[id number] - Group Psychotherapy
[id number] - Group Psychotherapy
[id number] - Group Psychotherapy
[id number] - Group Psychotherapy
[id number] - Group Psychotherapy
[id number] - Group Psychotherapy
[id number] - Group Psychotherapy
[id number] - Group Psychotherapy

## 2023-11-28 NOTE — BH PSYCHOLOGY - GROUP THERAPY NOTE - NSBHPSYCHOLPARTICIPCOMMENT_PSY_A_CORE FT
Patient claimed she was sleepy from the very start of the group session.  encouraged her to stay, which she initially responded to. However, as the group discussion progressed, patient raised her hand and claimed she would step out and return to the session; patient did not return.
She actively engaged in the group discussion and mindfulness exercise, asking pertinent questions and reading the worksheet appropriately. There were brief side conversations observed with another patient seated next to her, but she responded well to redirection. Additionally, she spontaneously redirected other group members when the discussion veered off-topic. 
The pt arrived late to the group, leaving and returning several times in the group. The pt contributed spontaneously to the group discussion. The pt asked relevant questions, provided relevant examples and appeared engaged throughout the session. The pt was able to engage with  and other members appropriately. 
The pt arrived late to the group and left early, staying for most of the group session. The pt contributed spontaneously to the group discussion. The pt asked relevant questions and appeared engaged throughout the session. The pt was able to engage with  and other members appropriately. 
Patient appeared attentive and interested in the group discussion.  Although the patient did not contribute spontaneously during the group session, patient was able to read from the worksheet when prompted. Patient was able to engage with the  and other members appropriately.
Patient appeared attentive and interested in the group discussion.  Although the patient did not contribute spontaneously during the group session, patient was able to read from the worksheet when prompted. Patient was able to engage with the  and other members appropriately.
Patient appeared attentive and interested in the group discussion.  Although the patient did not contribute spontaneously or volunteer to read during the group session, patient was able to follow along with the group discussion. Patient was able to engage with the  appropriately.
Patient participated actively in the session but required redirection several times during the group therapy session.  Patient was unable to respond positively to redirection by the psychologist during the group discussion. She became visibly upset when she was redirected as well as becoming verbally aggressive when the writer would not allow her to speak about an unrelated topic after constant interruptions. Patient accused the writer of 'gaslighting' her and walked out of the group only to return moments later then leaving again; this occurred at least two more times prior to the end of the session.
The pt arrived late to group and left early, staying for around 5 minutes. The pt contributed spontaneously, hearing the group topic, immediately walking into group, sharing about her personal challenges with her psychiatrist, and expressing frustration. The pt required multiple verbal prompts to be redirected, and after she stopped sharing she stayed for approximately 1 minute before leaving, apologizing and stating she was not “feeling it.” The pt was not able to engage with the  and other members appropriately.  
Patient appeared attentive and interested in the group discussion.  Although the patient did not contribute spontaneously during the group session, patient was able to read from the worksheet when prompted. Patient was able to engage with the  and other members appropriately.
The pt arrived late to the group, leaving once before returning again, staying for about half of the group session. The pt contributed spontaneously to the group discussion. The pt asked relevant questions and appeared engaged throughout the session. The pt was able to engage with  and other members appropriately. 
Patient appeared attentive and interested in the group discussion.  Although the patient did not contribute spontaneously during the group session, patient was able to read from the worksheet when prompted. Patient was able to engage with the  and other members appropriately.

## 2023-11-28 NOTE — BH INPATIENT PSYCHIATRY PROGRESS NOTE - NSBHMETABOLIC_PSY_ALL_CORE_FT
BMI:   HbA1c: A1C with Estimated Average Glucose Result: 4.9 % (10-18-23 @ 11:45)    Glucose:   BP: --Vital Signs Last 24 Hrs  T(C): 36.6 (11-28-23 @ 07:58), Max: 36.6 (11-28-23 @ 07:58)  T(F): 97.8 (11-28-23 @ 07:58), Max: 97.8 (11-28-23 @ 07:58)  HR: --  BP: --  BP(mean): --  RR: 18 (11-28-23 @ 07:58) (18 - 18)  SpO2: --    Orthostatic VS  11-28-23 @ 07:58  Lying BP: --/-- HR: --  Sitting BP: 116/76 HR: 85  Standing BP: 109/76 HR: 108  Site: --  Mode: --  Orthostatic VS  11-27-23 @ 06:51  Lying BP: --/-- HR: --  Sitting BP: 107/76 HR: 82  Standing BP: --/-- HR: --  Site: --  Mode: --  Orthostatic VS  11-26-23 @ 08:42  Lying BP: --/-- HR: --  Sitting BP: 101/62 HR: 72  Standing BP: 107/66 HR: 76  Site: --  Mode: --    Lipid Panel: Date/Time: 10-18-23 @ 11:45  Cholesterol, Serum: 123  LDL Cholesterol Calculated: 43  HDL Cholesterol, Serum: 49  Total Cholesterol/HDL Ration Measurement: --  Triglycerides, Serum: 153   BMI:   HbA1c: A1C with Estimated Average Glucose Result: 4.9 % (10-18-23 @ 11:45)    Glucose:   BP: --Vital Signs Last 24 Hrs  T(C): 36.6 (11-28-23 @ 07:58), Max: 36.6 (11-28-23 @ 07:58)  T(F): 97.8 (11-28-23 @ 07:58), Max: 97.8 (11-28-23 @ 07:58)  HR: --  BP: --  BP(mean): --  RR: 18 (11-28-23 @ 07:58) (18 - 18)  SpO2: --    Orthostatic VS  11-28-23 @ 07:58  Lying BP: --/-- HR: --  Sitting BP: 116/76 HR: 85  Standing BP: 109/76 HR: 108  Site: --  Mode: --  Orthostatic VS  11-27-23 @ 06:51  Lying BP: --/-- HR: --  Sitting BP: 107/76 HR: 82  Standing BP: --/-- HR: --  Site: --  Mode: --    Lipid Panel: Date/Time: 10-18-23 @ 11:45  Cholesterol, Serum: 123  LDL Cholesterol Calculated: 43  HDL Cholesterol, Serum: 49  Total Cholesterol/HDL Ration Measurement: --  Triglycerides, Serum: 153

## 2023-11-28 NOTE — BH PSYCHOLOGY - GROUP THERAPY NOTE - NSBHPSYCHOLGRPTYPE_PSY_A_CORE
General Group Therapy

## 2023-11-28 NOTE — BH INPATIENT PSYCHIATRY PROGRESS NOTE - NSBHFUPINTERVALHXFT_PSY_A_CORE
HR to 100s standing, accepting medications, no PRNs for agitation. Paranoid when RN yesterday offered her Invega Lelo, asked for a different person to administer. HR to 100s standing, accepting medications, no PRNs for agitation. Paranoid when RN yesterday offered her Invega Lelo, asked for a different person to administer.    Patient seen with treatment team. Reports feeling good, although has arm pain from injection site. Denies muscle stiffness, tremor, restlessness. Denies GI symptoms. Discusses issues with nurse yesterday and "breaking HIPAA" by not being discrete about [place]. Patient acknowledges feeling paranoid when she came in, but doesn't feel that was related to issues with nurse. Reports getting along well with family. Asks questions about discharge.

## 2023-11-28 NOTE — BH PSYCHOLOGY - GROUP THERAPY NOTE - TOKEN PULL-DIAGNOSIS
Primary Diagnosis:  Psychosis [F29]      Bipolar disorder, curr episode mixed, severe, with psychotic features [F31.64]      Psychosis [F29]        Problem Dx:   
Primary Diagnosis:  Psychosis [F29]      Bipolar disorder, curr episode mixed, severe, with psychotic features [F31.64]      Psychosis [F29]        Problem Dx:   
Primary Diagnosis:  Schizophrenia [F20.9]      Psychosis [F29]      Bipolar disorder, curr episode mixed, severe, with psychotic features [F31.64]      Psychosis [F29]        Problem Dx:   
Primary Diagnosis:  Psychosis [F29]      Bipolar disorder, curr episode mixed, severe, with psychotic features [F31.64]      Psychosis [F29]        Problem Dx:   
Primary Diagnosis:  Psychosis [F29]      Bipolar disorder, curr episode mixed, severe, with psychotic features [F31.64]      Psychosis [F29]        Problem Dx:   
Primary Diagnosis:  Schizophrenia [F20.9]      Psychosis [F29]      Bipolar disorder, curr episode mixed, severe, with psychotic features [F31.64]      Psychosis [F29]        Problem Dx:   
Primary Diagnosis:  Psychosis [F29]      Bipolar disorder, curr episode mixed, severe, with psychotic features [F31.64]      Psychosis [F29]        Problem Dx:   
Primary Diagnosis:  Schizophrenia [F20.9]      Psychosis [F29]      Bipolar disorder, curr episode mixed, severe, with psychotic features [F31.64]      Psychosis [F29]        Problem Dx:   
Primary Diagnosis:  Psychosis [F29]      Bipolar disorder, curr episode mixed, severe, with psychotic features [F31.64]      Psychosis [F29]        Problem Dx:   
Primary Diagnosis:  Psychosis [F29]      Bipolar disorder, curr episode mixed, severe, with psychotic features [F31.64]      Psychosis [F29]        Problem Dx:

## 2023-11-28 NOTE — BH PSYCHOLOGY - GROUP THERAPY NOTE - NSBHPTASSESSDT_PSY_A_CORE
27-Nov-2023 11:00
14-Nov-2023 11:00
08-Nov-2023 11:00
03-Nov-2023 11:00
10-Nov-2023 11:00
09-Nov-2023 11:00
28-Nov-2023 11:00
06-Nov-2023 11:00
31-Oct-2023 11:00
13-Nov-2023 11:00
27-Oct-2023 11:00
20-Nov-2023 11:00

## 2023-11-28 NOTE — BH INPATIENT PSYCHIATRY PROGRESS NOTE - PRN MEDS
MEDICATIONS  (PRN):  acetaminophen     Tablet .. 650 milliGRAM(s) Oral every 6 hours PRN Temp greater or equal to 38C (100.4F), Mild Pain (1 - 3)  LORazepam     Tablet 2 milliGRAM(s) Oral every 6 hours PRN agitation  melatonin. 3 milliGRAM(s) Oral at bedtime PRN Insomnia  nicotine  Polacrilex Gum 2 milliGRAM(s) Oral every 2 hours PRN nicotine cravings  OLANZapine 5 milliGRAM(s) Oral every 6 hours PRN agitation  OLANZapine Injectable 5 milliGRAM(s) IntraMuscular daily PRN Refusing court ordered PO medication  OLANZapine Injectable 5 milliGRAM(s) IntraMuscular once PRN severe agitation  propranolol 10 milliGRAM(s) Oral three times a day PRN Restlessness  traZODone 50 milliGRAM(s) Oral once PRN insomnia

## 2023-11-28 NOTE — BH PSYCHOLOGY - GROUP THERAPY NOTE - NSPSYCHOLGRPGENPT_PSY_A_CORE FT
Patient attended the cognitive behavior therapy group session. Group focused on the topic of automatic thoughts.  This included how to identify automatic thoughts and learning how to change the thoughts that have a negative impact on daily functioning. Group expectations and guidelines, as well as confidentiality and its limitations, were addressed. Principles of cognitive behavior therapy related to today's group topic were reviewed and discussed. Group members illustrated understanding of these concepts by giving personal examples based on their current experiences. Discussions stemmed from the group topics to the causal connection between thoughts, feelings, and behaviors.
Patient attended the cognitive behavior therapy group session. Group session focused on the topic of changing patterns of limited thinking.  Discussion revolved around the eight different patterns as well as identifying the group's recognition of their own patterns of limited thinking.  Group expectations and guidelines, as well as confidentiality and its limitations, were addressed. Principles of cognitive behavior therapy related to today's group topic were reviewed and discussed.  Group members illustrated understanding of these concepts by giving personal examples based on their current experiences. Discussions stemmed from the group topics to the causal connection between thoughts, feelings, and behaviors.
Patient attended the psychology cognitive-behavior therapy group. The discussion was focused on the topic of Stages of Change. Group expectations, guidelines, confidentiality (as well as its limitations) were reviewed. The group began with a description of the five stages of change and how it relates to mental health goals. Group members then learned how to work toward improvement while also normalizing relapses and ambivalence. Group members discussed strategies for change including planning ahead, conducting a pros and cons list, and creating a new self-image. Group members were encouraged to practice strategies to support their individual treatment goals. Discussion stemmed from the group’s connection between thoughts, feelings, and behaviors. Group members demonstrated their understanding through active participation, discussion, and by asking clarifying questions. Members were also provided with a handout describing the concepts and strategies discussed during group. 
Patient attended the cognitive behavior therapy group session. Group session focused on the topic of problem solving.  Discussion revolved around the identification of problems, exploring the maximization of benefits while reducing the cost of negative consequences as well as tangible strategies to solving problems.  Group expectations and guidelines, as well as confidentiality and its limitations, were addressed. Principles of cognitive behavior therapy related to today's group topic were reviewed and discussed. Group members illustrated understanding of these concepts by giving personal examples based on their current experiences. Discussions stemmed from the group topics to the causal connection between thoughts, feelings, and behaviors.
Patient attended the psychology cognitive-behavior therapy group. The discussion was focused on the topic of Memory Training. Group expectations, guidelines, confidentiality and its limitations were reviewed. The group began with a description of memory difficulties as well as the definitions of attention and concentration. Group members then learned about external and internal strategies to improve both attention and concentration. Also discussed were other factors that impact memory performance (such as nutrition, exercise, and mental stimulation).  Group members demonstrated their understanding through active participation, discussion, and by asking clarifying questions. Discussion stemmed from the group's focus on the connection between thoughts, feelings and behaviors. Group members were provided with a handout describing the concepts and strategies discussed during group.
Patient attended the psychology cognitive-behavior therapy group. The discussion was focused on the topic of Mindfulness. Group expectations, guidelines, confidentiality (as well as its limitations) were reviewed. The group began with a description of mindfulness and the benefits of practicing mindfulness. Group members then learned about how to practice mindfulness skills with an emphasis on the three key elements (pay attention on purpose, present moment, non-judgmental). A brief mindfulness exercise was provided, and group members were engaged in practicing mindfulness in the session. Discussion stemmed from the group's focus on the connection between thoughts, feelings and behaviors. Group members demonstrated their understanding through active participation, discussion, and by asking clarifying questions. Members were also provided with a handout describing the concepts and strategies discussed during group. 
Patient attended the psychology cognitive-behavior therapy group. The discussion was focused on the topic of behavioral activation. Group expectations, guidelines, confidentiality and its limitations were reviewed. The group began with a description of behavioral activation as a treatment technique to decrease avoidance and isolation. Group members then learned how to introduce activities gradually into daily schedules. Also discussed were methods to create a behavioral activation schedule to assist in improving mood and activity levels.  Group members demonstrated their understanding through active participation, discussion, and by asking clarifying questions. Discussion stemmed from the group's focus on the connection between thoughts, feelings and behaviors. Group members were provided with a handout describing the concepts and strategies discussed during group.
Patient attended the psychology cognitive-behavior therapy group. The discussion was focused on the topic of Interpersonal Effectiveness. Group expectations, guidelines, confidentiality (as well as its limitations) were reviewed. The group began with a description of the DEAR MAN skill from DBT. Group members then learned what to say when giving requests to others , as well as how to express their requests to increase the likelihood of the desired outcome. The importance of also being able to accept “no” effectively was also discussed. Group members then practiced effectively making requests. Discussion stemmed from the group’s connection between thoughts, feelings, and behaviors. Group members demonstrated their understanding through active participation, discussion, and by asking clarifying questions. Members were also provided with a handout describing the concepts and strategies discussed during group. 
Patient attended the psychology cognitive-behavior therapy group. The discussion was focused on the topic of Relaxation Techniques. Group expectations, guidelines, confidentiality (as well as its limitations) were reviewed. The group began with a description of relaxation techniques and what the benefits of these practices are. Group members then learned how to use diaphragmatic breathing to increase relaxation. The group members then discussed when using this skill would be helpful. Discussion stemmed from the group’s connection between thoughts, feelings, and behaviors. Group members demonstrated their understanding through active participation, discussion, and by asking clarifying questions. Members were also provided with a handout describing the concepts and strategies discussed during group. 
Patient attended the psychology cognitive-behavior therapy group. The discussion was focused on the topic of Self Esteem. Group expectations, guidelines, confidentiality (as well as its limitations) were reviewed. The group began with a description of self-esteem and signs of high and low self-esteem.  Group members then learned the five steps to boost self-esteem. Group members then reflected on when they had felt negatively about themselves as well as positively. Discussion stemmed from the group’s connection between thoughts, feelings, and behaviors. Group members demonstrated their understanding through active participation, discussion, and by asking clarifying questions. Members were also provided with a handout describing the concepts and strategies discussed during group. 
Patient attended the cognitive behavior therapy group session. Group session focused on the topic of communicating effectively.  Discussion revolved around the different types of communication styles as well as developing strategies to communicate more effectively.  Group expectations and guidelines, as well as confidentiality and its limitations, were addressed. Principles of cognitive behavior therapy related to today's group topic were reviewed and discussed. Group members illustrated understanding of these concepts by giving personal examples based on their current experiences. Discussions stemmed from the group topics to the causal connection between thoughts, feelings, and behaviors.
Patient attended the cognitive behavior therapy group session. Group session focused on the topic of developing and using social supports.  Discussion revolved around the characteristics of a supportive relationship as well as learning new ways to create one's social network.  Group expectations and guidelines, as well as confidentiality and its limitations, were addressed. Principles of cognitive behavior therapy related to today's group topic were reviewed and discussed. Group members illustrated understanding of these concepts by giving personal examples based on their current experiences.

## 2023-11-28 NOTE — BH INPATIENT PSYCHIATRY PROGRESS NOTE - MSE UNSTRUCTURED FT
Appearance: hygiene-intact, casually dressed  Behavior: less guarded and no longer overtly mistrustful, no PMA/PMR  Speech: normal rate and volume  Mood: "good"  Affect: calm, neutral, no irritability   Thought process: largely linear and organized  Thought content: not accusatory/paranoid with this MD, still paranoid towards family though more mild   Perceptions: no AH, VH elicited, not internally pre-occupied appearing  Insight: improving  Judgement: fair  Cognition: grossly intact   Gait: intact Appearance: hygiene-intact, casually dressed  Behavior: less guarded and no longer overtly mistrustful, no PMA/PMR  Speech: normal rate and volume  Mood: "good"  Affect: calm, neutral, no irritability   Thought process: largely linear and organized  Thought content: not accusatory/paranoid with this MD, still paranoid towards family and some staff, though more mild   Perceptions: no AH, VH elicited, not internally pre-occupied appearing  Insight: improving  Judgement: fair  Cognition: grossly intact   Gait: intact

## 2023-11-28 NOTE — BH PSYCHOLOGY - GROUP THERAPY NOTE - NSBHPSYCHOLPARTICIP_PSY_A_CORE
Partially engaged
Fully engaged
Partially engaged
Fully engaged
Fully engaged
Partially engaged
Fully engaged
Resistant to interventions
Partially engaged

## 2023-11-28 NOTE — BH PSYCHOLOGY - GROUP THERAPY NOTE - NSBHPSYCHOLGRPDUR_PSY_A_CORE
45 minutes

## 2023-11-28 NOTE — BH INPATIENT PSYCHIATRY PROGRESS NOTE - CURRENT MEDICATION
MEDICATIONS  (STANDING):  metFORMIN 500 milliGRAM(s) Oral two times a day with meals  nicotine -  14 mG/24Hr(s) Patch 1 Patch Transdermal daily    MEDICATIONS  (PRN):  acetaminophen     Tablet .. 650 milliGRAM(s) Oral every 6 hours PRN Temp greater or equal to 38C (100.4F), Mild Pain (1 - 3)  LORazepam     Tablet 2 milliGRAM(s) Oral every 6 hours PRN agitation  melatonin. 3 milliGRAM(s) Oral at bedtime PRN Insomnia  nicotine  Polacrilex Gum 2 milliGRAM(s) Oral every 2 hours PRN nicotine cravings  OLANZapine 5 milliGRAM(s) Oral every 6 hours PRN agitation  OLANZapine Injectable 5 milliGRAM(s) IntraMuscular daily PRN Refusing court ordered PO medication  OLANZapine Injectable 5 milliGRAM(s) IntraMuscular once PRN severe agitation  propranolol 10 milliGRAM(s) Oral three times a day PRN Restlessness  traZODone 50 milliGRAM(s) Oral once PRN insomnia

## 2023-11-28 NOTE — BH INPATIENT PSYCHIATRY PROGRESS NOTE - NSBHATTESTBILLING_PSY_A_CORE
54747-Yiipoiazoc OBS or IP - moderate complexity OR 35-49 mins 42838-Qzdrtoprqt OBS or IP - low complexity OR 25-34 mins

## 2023-11-28 NOTE — BH INPATIENT PSYCHIATRY PROGRESS NOTE - NSBHCHARTREVIEWVS_PSY_A_CORE FT
Vital Signs Last 24 Hrs  T(C): 36.6 (11-28-23 @ 07:58), Max: 36.6 (11-28-23 @ 07:58)  T(F): 97.8 (11-28-23 @ 07:58), Max: 97.8 (11-28-23 @ 07:58)  HR: --  BP: --  BP(mean): --  RR: 18 (11-28-23 @ 07:58) (18 - 18)  SpO2: --    Orthostatic VS  11-28-23 @ 07:58  Lying BP: --/-- HR: --  Sitting BP: 116/76 HR: 85  Standing BP: 109/76 HR: 108  Site: --  Mode: --  Orthostatic VS  11-27-23 @ 06:51  Lying BP: --/-- HR: --  Sitting BP: 107/76 HR: 82  Standing BP: --/-- HR: --  Site: --  Mode: --  Orthostatic VS  11-26-23 @ 08:42  Lying BP: --/-- HR: --  Sitting BP: 101/62 HR: 72  Standing BP: 107/66 HR: 76  Site: --  Mode: --   Vital Signs Last 24 Hrs  T(C): 36.6 (11-28-23 @ 07:58), Max: 36.6 (11-28-23 @ 07:58)  T(F): 97.8 (11-28-23 @ 07:58), Max: 97.8 (11-28-23 @ 07:58)  HR: --  BP: --  BP(mean): --  RR: 18 (11-28-23 @ 07:58) (18 - 18)  SpO2: --    Orthostatic VS  11-28-23 @ 07:58  Lying BP: --/-- HR: --  Sitting BP: 116/76 HR: 85  Standing BP: 109/76 HR: 108  Site: --  Mode: --  Orthostatic VS  11-27-23 @ 06:51  Lying BP: --/-- HR: --  Sitting BP: 107/76 HR: 82  Standing BP: --/-- HR: --  Site: --  Mode: --

## 2023-11-28 NOTE — BH PSYCHOLOGY - GROUP THERAPY NOTE - NSBHPSYCHOLDISCUSS_PSY_A_CORE
Discussion with collaborating staff took place since patient's last visit

## 2023-11-29 PROCEDURE — 99231 SBSQ HOSP IP/OBS SF/LOW 25: CPT

## 2023-11-29 RX ADMIN — METFORMIN HYDROCHLORIDE 500 MILLIGRAM(S): 850 TABLET ORAL at 08:05

## 2023-11-29 RX ADMIN — Medication 1 PATCH: at 08:05

## 2023-11-29 RX ADMIN — Medication 1 PATCH: at 19:45

## 2023-11-29 RX ADMIN — METFORMIN HYDROCHLORIDE 500 MILLIGRAM(S): 850 TABLET ORAL at 16:02

## 2023-11-29 NOTE — BH INPATIENT PSYCHIATRY PROGRESS NOTE - NSBHMETABOLIC_PSY_ALL_CORE_FT
BMI:   HbA1c: A1C with Estimated Average Glucose Result: 4.9 % (10-18-23 @ 11:45)    Glucose:   BP: --Vital Signs Last 24 Hrs  T(C): --  T(F): --  HR: --  BP: --  BP(mean): --  RR: --  SpO2: --    Orthostatic VS  11-28-23 @ 07:58  Lying BP: --/-- HR: --  Sitting BP: 116/76 HR: 85  Standing BP: 109/76 HR: 108  Site: --  Mode: --    Lipid Panel: Date/Time: 10-18-23 @ 11:45  Cholesterol, Serum: 123  LDL Cholesterol Calculated: 43  HDL Cholesterol, Serum: 49  Total Cholesterol/HDL Ration Measurement: --  Triglycerides, Serum: 153   BMI:   HbA1c: A1C with Estimated Average Glucose Result: 4.9 % (10-18-23 @ 11:45)    Glucose:   BP: --Vital Signs Last 24 Hrs  T(C): 37.1 (11-29-23 @ 08:22), Max: 37.1 (11-29-23 @ 08:22)  T(F): 98.8 (11-29-23 @ 08:22), Max: 98.8 (11-29-23 @ 08:22)  HR: --  BP: --  BP(mean): --  RR: --  SpO2: --    Orthostatic VS  11-29-23 @ 08:22  Lying BP: --/-- HR: --  Sitting BP: 119/66 HR: 91  Standing BP: 102/67 HR: 101  Site: --  Mode: --  Orthostatic VS  11-28-23 @ 07:58  Lying BP: --/-- HR: --  Sitting BP: 116/76 HR: 85  Standing BP: 109/76 HR: 108  Site: --  Mode: --    Lipid Panel: Date/Time: 10-18-23 @ 11:45  Cholesterol, Serum: 123  LDL Cholesterol Calculated: 43  HDL Cholesterol, Serum: 49  Total Cholesterol/HDL Ration Measurement: --  Triglycerides, Serum: 153
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
## 2023-11-29 NOTE — BH INPATIENT PSYCHIATRY PROGRESS NOTE - NSBHCHARTREVIEWVS_PSY_A_CORE FT
Vital Signs Last 24 Hrs  T(C): --  T(F): --  HR: --  BP: --  BP(mean): --  RR: --  SpO2: --    Orthostatic VS  11-28-23 @ 07:58  Lying BP: --/-- HR: --  Sitting BP: 116/76 HR: 85  Standing BP: 109/76 HR: 108  Site: --  Mode: --   Vital Signs Last 24 Hrs  T(C): 37.1 (11-29-23 @ 08:22), Max: 37.1 (11-29-23 @ 08:22)  T(F): 98.8 (11-29-23 @ 08:22), Max: 98.8 (11-29-23 @ 08:22)  HR: --  BP: --  BP(mean): --  RR: --  SpO2: --    Orthostatic VS  11-29-23 @ 08:22  Lying BP: --/-- HR: --  Sitting BP: 119/66 HR: 91  Standing BP: 102/67 HR: 101  Site: --  Mode: --  Orthostatic VS  11-28-23 @ 07:58  Lying BP: --/-- HR: --  Sitting BP: 116/76 HR: 85  Standing BP: 109/76 HR: 108  Site: --  Mode: --

## 2023-11-29 NOTE — BH INPATIENT PSYCHIATRY PROGRESS NOTE - MSE UNSTRUCTURED FT
Appearance: hygiene-intact, casually dressed  Behavior: less guarded and no longer overtly mistrustful, no PMA/PMR  Speech: normal rate and volume  Mood: "good"  Affect: calm, neutral, no irritability   Thought process: largely linear and organized  Thought content: not accusatory/paranoid with this MD, still paranoid towards family and some staff, though more mild   Perceptions: no AH, VH elicited, not internally pre-occupied appearing  Insight: improving  Judgement: fair  Cognition: grossly intact   Gait: intact Appearance: hygiene-intact, casually dressed  Behavior: less guarded and no longer overtly mistrustful, no PMA/PMR  Speech: normal rate and volume  Mood: "fine"  Affect: calm, neutral, no irritability   Thought process: largely linear and organized  Thought content: not accusatory/paranoid with this MD, still paranoid towards family and some staff, though more mild   Perceptions: no AH, VH elicited, not internally pre-occupied appearing  Insight: improving  Judgement: fair  Cognition: grossly intact   Gait: intact
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
## 2023-11-30 PROCEDURE — 99231 SBSQ HOSP IP/OBS SF/LOW 25: CPT

## 2023-11-30 RX ADMIN — Medication 1 PATCH: at 09:12

## 2023-11-30 RX ADMIN — Medication 2 MILLIGRAM(S): at 16:07

## 2023-11-30 RX ADMIN — METFORMIN HYDROCHLORIDE 500 MILLIGRAM(S): 850 TABLET ORAL at 16:19

## 2023-11-30 RX ADMIN — METFORMIN HYDROCHLORIDE 500 MILLIGRAM(S): 850 TABLET ORAL at 09:08

## 2023-11-30 NOTE — BH INPATIENT PSYCHIATRY PROGRESS NOTE - NSBHCHARTREVIEWVS_PSY_A_CORE FT
Vital Signs Last 24 Hrs  T(C): 36.4 (11-30-23 @ 08:22), Max: 36.4 (11-30-23 @ 08:22)  T(F): 97.6 (11-30-23 @ 08:22), Max: 97.6 (11-30-23 @ 08:22)  HR: --  BP: --  BP(mean): --  RR: --  SpO2: --    Orthostatic VS  11-30-23 @ 08:22  Lying BP: --/-- HR: --  Sitting BP: 125/68 HR: 70  Standing BP: 135/74 HR: 86  Site: --  Mode: --  Orthostatic VS  11-29-23 @ 08:22  Lying BP: --/-- HR: --  Sitting BP: 119/66 HR: 91  Standing BP: 102/67 HR: 101  Site: --  Mode: --

## 2023-11-30 NOTE — BH TREATMENT PLAN - NSBHPRIMARYDX_PSY_ALL_CORE
Psychosis    
Psychosis    
Schizophrenia    
Psychosis    
Schizophrenia    
Psychosis    
Schizophrenia    
Psychosis    
Psychosis

## 2023-11-30 NOTE — BH INPATIENT PSYCHIATRY PROGRESS NOTE - MSE UNSTRUCTURED FT
Appearance: hygiene-intact, casually dressed  Behavior: less guarded and no longer overtly mistrustful, no PMA/PMR  Speech: normal rate and volume  Mood: "fine"  Affect: calm, neutral, no irritability   Thought process: largely linear and organized  Thought content: not accusatory/paranoid with this MD, still paranoid towards family and some staff, though more mild   Perceptions: no AH, VH elicited, not internally pre-occupied appearing  Insight: improving  Judgement: fair  Cognition: grossly intact   Gait: intact Appearance: hygiene-intact, casually dressed  Behavior: less guarded and no longer overtly mistrustful, no PMA/PMR  Speech: normal rate and volume  Mood: "okay"  Affect: calm, neutral, no irritability   Thought process: largely linear and organized  Thought content: not accusatory/paranoid with this MD, still paranoid towards family and some staff, though more mild   Perceptions: no AH, VH elicited, not internally pre-occupied appearing  Insight: improving  Judgement: fair  Cognition: grossly intact   Gait: intact

## 2023-11-30 NOTE — BH TREATMENT PLAN - NSDCCRITERIA_PSY_ALL_CORE
symptom management, safety planning, care coordination 
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
## 2023-11-30 NOTE — BH TREATMENT PLAN - NSTXDISORGINTERMD_PSY_ALL_CORE
Med management with Abilify
Med management with Abilify
Med management with Demetria Lind
Med management with risperidone 
Med management with Abilify
Med management with Invega
Med management with Demetria Lind

## 2023-11-30 NOTE — BH TREATMENT PLAN - NSTXDISORGINTERRN_PSY_ALL_CORE
Assess thought process daily  Monitor medication compliance and response  Maintain reality orientation daily  Maintain safe and therapeutic process

## 2023-11-30 NOTE — BH TREATMENT PLAN - NSTXPSYCHOINTERMD_PSY_ALL_CORE
Med management with Demetria Lind
Med management with Invega
Med management with risperidone 
Med management with Abilify
Med management with Demetria Lind

## 2023-11-30 NOTE — BH TREATMENT PLAN - NSTXPSYCHODATEEST_PSY_ALL_CORE
01-Nov-2023
07-Oct-2023
01-Nov-2023
14-Oct-2023
07-Oct-2023
01-Nov-2023
19-Oct-2023
01-Nov-2023
26-Nov-2023

## 2023-11-30 NOTE — BH TREATMENT PLAN - NSTXDISORGDATETRGT_PSY_ALL_CORE
08-Nov-2023
20-Nov-2023
15-Oct-2023
08-Dec-2023
13-Nov-2023
19-Oct-2023
28-Oct-2023
15-Oct-2023
20-Nov-2023

## 2023-11-30 NOTE — BH TREATMENT PLAN - NSTXDISORGDATEEST_PSY_ALL_CORE
06-Nov-2023
08-Oct-2023
01-Nov-2023
07-Oct-2023
06-Nov-2023
08-Oct-2023
06-Nov-2023

## 2023-11-30 NOTE — BH TREATMENT PLAN - NSTXDCFAMINTERSW_PSY_ALL_CORE
SW will encourage pt to continue to take medication and attend individual therapy sessions on unit to appropriately address her engagement issues with her family
Ongoing support, psychoed and encouragement provided
SW will encourage pt to incllude supports in d.c plan.
Ongoing support psychoed and encouragement provided in effort to comply with meds, tx and discharge plans.
support, psychoed and enocuragement provided
Ongoing support, psyhchoed and encouragement provided

## 2023-11-30 NOTE — BH INPATIENT PSYCHIATRY PROGRESS NOTE - NSBHFUPINTERVALHXFT_PSY_A_CORE
VSS, accepting medications, no PRNs for agitation, no acute events.  VSS, accepting medications, no PRNs for agitation, no acute events.     Patient states she feels "okay" and denies physical issues, including muscle stiffness, tremor or restlessness. Denies GI symptoms. Discuss upcoming discharge, patient is looking forward. Has some bigger questions to figure out about finishing school, going to [place] to help mother-in-law. Discuss medications, and reflect positive changes with medication. Patient acknowledges that medications might be helpful, but reports she was getting better without them, but maybe they can help prevent relapse.

## 2023-11-30 NOTE — BH INPATIENT PSYCHIATRY PROGRESS NOTE - NSBHMETABOLIC_PSY_ALL_CORE_FT
BMI:   HbA1c: A1C with Estimated Average Glucose Result: 4.9 % (10-18-23 @ 11:45)    Glucose:   BP: --Vital Signs Last 24 Hrs  T(C): 36.4 (11-30-23 @ 08:22), Max: 36.4 (11-30-23 @ 08:22)  T(F): 97.6 (11-30-23 @ 08:22), Max: 97.6 (11-30-23 @ 08:22)  HR: --  BP: --  BP(mean): --  RR: --  SpO2: --    Orthostatic VS  11-30-23 @ 08:22  Lying BP: --/-- HR: --  Sitting BP: 125/68 HR: 70  Standing BP: 135/74 HR: 86  Site: --  Mode: --  Orthostatic VS  11-29-23 @ 08:22  Lying BP: --/-- HR: --  Sitting BP: 119/66 HR: 91  Standing BP: 102/67 HR: 101  Site: --  Mode: --    Lipid Panel: Date/Time: 10-18-23 @ 11:45  Cholesterol, Serum: 123  LDL Cholesterol Calculated: 43  HDL Cholesterol, Serum: 49  Total Cholesterol/HDL Ration Measurement: --  Triglycerides, Serum: 153

## 2023-11-30 NOTE — BH TREATMENT PLAN - NSTXPSYCHODATETRGT_PSY_ALL_CORE
08-Nov-2023
24-Nov-2023
14-Oct-2023
19-Oct-2023
10-Dec-2023
14-Oct-2023
26-Oct-2023
15-Nov-2023
08-Nov-2023
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
## 2023-11-30 NOTE — BH TREATMENT PLAN - NSTXPATIENTPARTICIPATE_PSY_ALL_CORE
Patient participated in identification of needs/problems/goals for treatment/Patient participated in defining interventions/Patient participated in development of after care plan
Patient participated in defining interventions
Patient participated in identification of needs/problems/goals for treatment/Patient participated in defining interventions/Patient participated in development of after care plan
Patient participated in defining interventions/Patient participated in development of after care plan
No, patient unwilling to participate
Patient participated in defining interventions/Patient participated in development of after care plan
Patient participated in identification of needs/problems/goals for treatment/Patient participated in defining interventions/Patient participated in development of after care plan
No, patient unwilling to participate
Patient participated in development of after care plan

## 2023-11-30 NOTE — BH TREATMENT PLAN - NSTXPSYCHOGOAL_PSY_ALL_CORE
Make at least 5 reality based statements/requests to staff and/or peers
Make at least 5 reality based statements/requests to staff and/or peers
Will engage in a 15 minute conversation with no irrational content
Will identify 2 coping skills that assist with focus on reality
Will engage in a 15 minute conversation with no irrational content
Will engage in a 15 minute conversation with no irrational content
Will identify 2 coping skills that assist with focus on reality
Will engage in a 15 minute conversation with no irrational content
Will identify 2 coping skills that assist with focus on reality

## 2023-11-30 NOTE — BH INPATIENT PSYCHIATRY PROGRESS NOTE - CURRENT MEDICATION
MEDICATIONS  (STANDING):  metFORMIN 500 milliGRAM(s) Oral two times a day with meals  nicotine -  14 mG/24Hr(s) Patch 1 Patch Transdermal daily    MEDICATIONS  (PRN):  acetaminophen     Tablet .. 650 milliGRAM(s) Oral every 6 hours PRN Temp greater or equal to 38C (100.4F), Mild Pain (1 - 3)  LORazepam     Tablet 2 milliGRAM(s) Oral every 6 hours PRN agitation  melatonin. 3 milliGRAM(s) Oral at bedtime PRN Insomnia  nicotine  Polacrilex Gum 2 milliGRAM(s) Oral every 2 hours PRN nicotine cravings  OLANZapine 5 milliGRAM(s) Oral every 6 hours PRN agitation  OLANZapine Injectable 5 milliGRAM(s) IntraMuscular once PRN severe agitation  OLANZapine Injectable 5 milliGRAM(s) IntraMuscular daily PRN Refusing court ordered PO medication  propranolol 10 milliGRAM(s) Oral three times a day PRN Restlessness  traZODone 50 milliGRAM(s) Oral once PRN insomnia

## 2023-11-30 NOTE — BH TREATMENT PLAN - NSTXDISORGINTERPR_PSY_ALL_CORE
Patient will be encouraged to attend daily groups and develop effective coping skills. Psych rehab staff will continue to meet with patient regularly in order to establish therapeutic rapport.
Pt. has made progress towards established psych rehab goal as evidenced by her reporting coloring as a coping skill that she has found helpful. Pt. is encouraged to identify  and utilize another coping skills that can support her presenting needs. Over the next seven days, Psychiatric rehabilitation staff will continue to provide encouragement, support, psychotherapy, and psychoeducation to assist the patient in the progression of established treatment goal.
Over the next 7 days, psychiatric rehabilitation staff will support the pt towards goal progress and encourage group participation for skill development and socialization.
Psychiatric rehabilitation staff will provide encouragement, support, psychotherapy, and psychoeducation to assist the patient in the progression of her treatment goal.
Psychiatric rehabilitation staff will continue to provide encouragement, support, psychotherapy, and psychoeducation to assist the patient in the progression of her treatment goal.
Over the next 7 days, psychiatric rehabilitation staff will support the pt towards goal progress and encourage group participation for skill development and socialization.
Pt. has made progress towards established psych rehab goal as evidenced by her reporting coloring as a coping skill that she has found helpful. Pt. is encouraged to identify  and utilize another coping skills that can support her presenting needs. Over the next seven days, Psychiatric rehabilitation staff will continue to provide encouragement, support, psychotherapy, and psychoeducation to assist the patient in the progression of established treatment goal.
Pt. has not made progress towards established psych rehab goal as evidenced by pt. not being able to identify coping skills as of yet. Over the next seven days, Psychiatric rehabilitation staff will continue to provide encouragement, support, psychotherapy, and psychoeducation to assist the patient in the progression of established treatment goal.
Pt. has not made progress towards her established psych rehab goals as evidenced by pt. stating that there is nothing wrong with her. Pt. limited insight has prevented her from seeking out coping skills at this time. Over the next seven days, Psychiatric rehabilitation staff will continue to provide encouragement, support, psychotherapy, and psychoeducation to assist the patient in the progression of established treatment goal.
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
## 2023-12-01 PROCEDURE — 99231 SBSQ HOSP IP/OBS SF/LOW 25: CPT

## 2023-12-01 RX ADMIN — Medication 1 PATCH: at 08:22

## 2023-12-01 RX ADMIN — METFORMIN HYDROCHLORIDE 500 MILLIGRAM(S): 850 TABLET ORAL at 16:14

## 2023-12-01 RX ADMIN — Medication 2 MILLIGRAM(S): at 10:33

## 2023-12-01 RX ADMIN — METFORMIN HYDROCHLORIDE 500 MILLIGRAM(S): 850 TABLET ORAL at 08:22

## 2023-12-01 NOTE — BH INPATIENT PSYCHIATRY PROGRESS NOTE - MSE UNSTRUCTURED FT
Appearance: hygiene-intact, casually dressed  Behavior: less guarded and no longer overtly mistrustful, no PMA/PMR  Speech: normal rate and volume  Mood: "okay"  Affect: calm, neutral, no irritability   Thought process: largely linear and organized  Thought content: not accusatory/paranoid with this MD, still paranoid towards family and some staff, though more mild   Perceptions: no AH, VH elicited, not internally pre-occupied appearing  Insight: improving  Judgement: fair  Cognition: grossly intact   Gait: intact Appearance: hygiene-intact, casually dressed  Behavior: less guarded and no longer overtly mistrustful, no PMA/PMR  Speech: normal rate and volume  Mood: "good"  Affect: calm, neutral, no irritability   Thought process: largely linear and organized, though can become more disorganized when discussing paranoia  Thought content: not accusatory/paranoid with this MD, still paranoid towards family and some staff, though more mild   Perceptions: no AH, VH elicited, not internally pre-occupied appearing  Insight: improving  Judgement: fair  Cognition: grossly intact   Gait: intact

## 2023-12-01 NOTE — BH INPATIENT PSYCHIATRY PROGRESS NOTE - NS ED [facility] REVIEW OF ED CHART AVAILABLE INVESTIGATIONS REVIEWED
None available

## 2023-12-01 NOTE — BH INPATIENT PSYCHIATRY PROGRESS NOTE - CURRENT MEDICATION
MEDICATIONS  (STANDING):  metFORMIN 500 milliGRAM(s) Oral two times a day with meals  nicotine -  14 mG/24Hr(s) Patch 1 Patch Transdermal daily    MEDICATIONS  (PRN):  acetaminophen     Tablet .. 650 milliGRAM(s) Oral every 6 hours PRN Temp greater or equal to 38C (100.4F), Mild Pain (1 - 3)  LORazepam     Tablet 2 milliGRAM(s) Oral every 6 hours PRN agitation  melatonin. 3 milliGRAM(s) Oral at bedtime PRN Insomnia  nicotine  Polacrilex Gum 2 milliGRAM(s) Oral every 2 hours PRN nicotine cravings  OLANZapine 5 milliGRAM(s) Oral every 6 hours PRN agitation  OLANZapine Injectable 5 milliGRAM(s) IntraMuscular daily PRN Refusing court ordered PO medication  OLANZapine Injectable 5 milliGRAM(s) IntraMuscular once PRN severe agitation  propranolol 10 milliGRAM(s) Oral three times a day PRN Restlessness  traZODone 50 milliGRAM(s) Oral once PRN insomnia   MEDICATIONS  (STANDING):  metFORMIN 500 milliGRAM(s) Oral two times a day with meals  nicotine -  14 mG/24Hr(s) Patch 1 Patch Transdermal daily    MEDICATIONS  (PRN):  acetaminophen     Tablet .. 650 milliGRAM(s) Oral every 6 hours PRN Temp greater or equal to 38C (100.4F), Mild Pain (1 - 3)  LORazepam     Tablet 2 milliGRAM(s) Oral every 6 hours PRN agitation  melatonin. 3 milliGRAM(s) Oral at bedtime PRN Insomnia  nicotine  Polacrilex Gum 2 milliGRAM(s) Oral every 2 hours PRN nicotine cravings  OLANZapine 5 milliGRAM(s) Oral every 6 hours PRN agitation  OLANZapine Injectable 5 milliGRAM(s) IntraMuscular once PRN severe agitation  OLANZapine Injectable 5 milliGRAM(s) IntraMuscular daily PRN Refusing court ordered PO medication  propranolol 10 milliGRAM(s) Oral three times a day PRN Restlessness  traZODone 50 milliGRAM(s) Oral once PRN insomnia

## 2023-12-01 NOTE — BH INPATIENT PSYCHIATRY PROGRESS NOTE - NSBHFUPINTERVALHXFT_PSY_A_CORE
VSS, accepting medications, no PRNs for agitation, no acute events.    VSS, accepting medications, no PRNs for agitation, no acute events. Slept well overnight, per sleep log.    Patient reports she woke up 3 times last night due to thirst. Reports mood is good, looking forward to d/c next week. Denies physical issues, including muscle stiffness, tremor, restlessness, GI distress. Denies SI, HI. When family is brought up, begins talking about how her analyst professor has made comments about her brother in law, and also about how her  may be mentally ill. Able to be redirected. Asks appropriate questions about discharge.
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
## 2023-12-01 NOTE — BH INPATIENT PSYCHIATRY PROGRESS NOTE - NSBHMETABOLIC_PSY_ALL_CORE_FT
BMI:   HbA1c: A1C with Estimated Average Glucose Result: 4.9 % (10-18-23 @ 11:45)    Glucose:   BP: --Vital Signs Last 24 Hrs  T(C): 36.4 (12-01-23 @ 07:41), Max: 36.4 (11-30-23 @ 08:22)  T(F): 97.6 (12-01-23 @ 07:41), Max: 97.6 (11-30-23 @ 08:22)  HR: --  BP: --  BP(mean): --  RR: --  SpO2: --    Orthostatic VS  12-01-23 @ 07:41  Lying BP: --/-- HR: --  Sitting BP: 108/72 HR: 66  Standing BP: 105/74 HR: 84  Site: --  Mode: --  Orthostatic VS  11-30-23 @ 08:22  Lying BP: --/-- HR: --  Sitting BP: 125/68 HR: 70  Standing BP: 135/74 HR: 86  Site: --  Mode: --  Orthostatic VS  11-29-23 @ 08:22  Lying BP: --/-- HR: --  Sitting BP: 119/66 HR: 91  Standing BP: 102/67 HR: 101  Site: --  Mode: --    Lipid Panel: Date/Time: 10-18-23 @ 11:45  Cholesterol, Serum: 123  LDL Cholesterol Calculated: 43  HDL Cholesterol, Serum: 49  Total Cholesterol/HDL Ration Measurement: --  Triglycerides, Serum: 153   BMI:   HbA1c: A1C with Estimated Average Glucose Result: 4.9 % (10-18-23 @ 11:45)    Glucose:   BP: --Vital Signs Last 24 Hrs  T(C): 36.4 (12-01-23 @ 07:41), Max: 36.4 (12-01-23 @ 07:41)  T(F): 97.6 (12-01-23 @ 07:41), Max: 97.6 (12-01-23 @ 07:41)  HR: --  BP: --  BP(mean): --  RR: --  SpO2: --    Orthostatic VS  12-01-23 @ 07:41  Lying BP: --/-- HR: --  Sitting BP: 108/72 HR: 66  Standing BP: 105/74 HR: 84  Site: --  Mode: --  Orthostatic VS  11-30-23 @ 08:22  Lying BP: --/-- HR: --  Sitting BP: 125/68 HR: 70  Standing BP: 135/74 HR: 86  Site: --  Mode: --    Lipid Panel: Date/Time: 10-18-23 @ 11:45  Cholesterol, Serum: 123  LDL Cholesterol Calculated: 43  HDL Cholesterol, Serum: 49  Total Cholesterol/HDL Ration Measurement: --  Triglycerides, Serum: 153

## 2023-12-02 RX ADMIN — METFORMIN HYDROCHLORIDE 500 MILLIGRAM(S): 850 TABLET ORAL at 15:57

## 2023-12-02 RX ADMIN — METFORMIN HYDROCHLORIDE 500 MILLIGRAM(S): 850 TABLET ORAL at 08:28

## 2023-12-02 RX ADMIN — Medication 2 MILLIGRAM(S): at 09:36

## 2023-12-02 RX ADMIN — Medication 1 PATCH: at 08:28
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
## 2023-12-03 LAB
SARS-COV-2 RNA SPEC QL NAA+PROBE: SIGNIFICANT CHANGE UP
SARS-COV-2 RNA SPEC QL NAA+PROBE: SIGNIFICANT CHANGE UP

## 2023-12-03 RX ADMIN — METFORMIN HYDROCHLORIDE 500 MILLIGRAM(S): 850 TABLET ORAL at 08:16

## 2023-12-03 RX ADMIN — METFORMIN HYDROCHLORIDE 500 MILLIGRAM(S): 850 TABLET ORAL at 17:14

## 2023-12-03 RX ADMIN — Medication 1 PATCH: at 08:16

## 2023-12-04 PROCEDURE — 99232 SBSQ HOSP IP/OBS MODERATE 35: CPT

## 2023-12-04 PROCEDURE — 90832 PSYTX W PT 30 MINUTES: CPT

## 2023-12-04 RX ORDER — LANOLIN ALCOHOL/MO/W.PET/CERES
1 CREAM (GRAM) TOPICAL
Qty: 0 | Refills: 0 | DISCHARGE
Start: 2023-12-04

## 2023-12-04 RX ORDER — NICOTINE POLACRILEX 2 MG
1 GUM BUCCAL
Qty: 1 | Refills: 0
Start: 2023-12-04 | End: 2023-12-17

## 2023-12-04 RX ORDER — METFORMIN HYDROCHLORIDE 850 MG/1
1 TABLET ORAL
Qty: 28 | Refills: 0
Start: 2023-12-04 | End: 2023-12-17

## 2023-12-04 RX ORDER — NICOTINE POLACRILEX 2 MG
1 GUM BUCCAL
Qty: 25 | Refills: 0
Start: 2023-12-04 | End: 2023-12-17

## 2023-12-04 RX ORDER — NICOTINE POLACRILEX 2 MG
1 GUM BUCCAL
Qty: 14 | Refills: 0
Start: 2023-12-04 | End: 2023-12-17

## 2023-12-04 RX ADMIN — METFORMIN HYDROCHLORIDE 500 MILLIGRAM(S): 850 TABLET ORAL at 08:00

## 2023-12-04 RX ADMIN — Medication 1 PATCH: at 20:04

## 2023-12-04 RX ADMIN — Medication 1 PATCH: at 08:01

## 2023-12-04 RX ADMIN — METFORMIN HYDROCHLORIDE 500 MILLIGRAM(S): 850 TABLET ORAL at 16:18

## 2023-12-04 NOTE — BH PSYCHOLOGY - CLINICIAN PSYCHOTHERAPY NOTE - NSBHPSYCHOLCRISIS_PSY_A_CORE
Unscheduled interaction with patient

## 2023-12-04 NOTE — BH INPATIENT PSYCHIATRY PROGRESS NOTE - NSTXPSYCHOGOAL_PSY_ALL_CORE
Will identify 2 coping skills that assist with focus on reality
Will engage in a 15 minute conversation with no irrational content
Will identify 2 coping skills that assist with focus on reality
Will engage in a 15 minute conversation with no irrational content
Will identify 2 coping skills that assist with focus on reality
Make at least 5 reality based statements/requests to staff and/or peers
Will engage in a 15 minute conversation with no irrational content
Will identify 2 coping skills that assist with focus on reality
Will engage in a 15 minute conversation with no irrational content
Make at least 5 reality based statements/requests to staff and/or peers
Make at least 5 reality based statements/requests to staff and/or peers
Will identify 2 coping skills that assist with focus on reality
Will engage in a 15 minute conversation with no irrational content
Will engage in a 15 minute conversation with no irrational content
Will identify 2 coping skills that assist with focus on reality
Will identify 2 coping skills that assist with focus on reality
Will engage in a 15 minute conversation with no irrational content
Will identify 2 coping skills that assist with focus on reality
Will identify 2 coping skills that assist with focus on reality
Make at least 5 reality based statements/requests to staff and/or peers
Will engage in a 15 minute conversation with no irrational content
Will engage in a 15 minute conversation with no irrational content
Make at least 5 reality based statements/requests to staff and/or peers
Will identify 2 coping skills that assist with focus on reality
Will identify 2 coping skills that assist with focus on reality
Will engage in a 15 minute conversation with no irrational content
Will engage in a 15 minute conversation with no irrational content
Will identify 2 coping skills that assist with focus on reality
Will engage in a 15 minute conversation with no irrational content
Will identify 2 coping skills that assist with focus on reality
Will engage in a 15 minute conversation with no irrational content
Will identify 2 coping skills that assist with focus on reality
Will identify 2 coping skills that assist with focus on reality
Will engage in a 15 minute conversation with no irrational content
Will engage in a 15 minute conversation with no irrational content
Will identify 2 coping skills that assist with focus on reality

## 2023-12-04 NOTE — BH DISCHARGE NOTE NURSING/SOCIAL WORK/PSYCH REHAB - NSDCPRRECOMMEND_PSY_ALL_CORE
Psychiatric Rehabilitation staff recommends that the patient engage in outpatient services for continued medication and symptom management. Upon discharge, patient has an appointment scheduled with [facility]. Psychiatric Rehabilitation staff recommends that the patient engage in outpatient services for continued medication and symptom management. Upon discharge, patient has an appointment scheduled with [facility].

## 2023-12-04 NOTE — BH PSYCHOLOGY - CLINICIAN PSYCHOTHERAPY NOTE - NSBHPSYCHOLNARRATIVE_PSY_A_CORE FT
Patient requested to meet with the Supervising Psychologist; writer agreed. Patient inquired if the writer's spouse was also [place]. She then spoke of how grateful she was to the team and the writer for their assistance during this hospitalization. When asked what she would do differently going forward, patient spoke of wanting to remain on medication because she felt better when taking it. She admitted she did not do well when she stopped taking her medications, thinking she did not need them anymore. However, she recalled the last time when she stopped medications years ago and was able to recognize how poorly she had done back then as well. Writer encouraged her to continue working on her coping skills and remarked on her positive progress in her treatment. Patient again expressed her appreciation for the writer's assistance and encouraged him to buy the Fijian chocolates at [employer] because they were that good.  Patient requested to meet with the Supervising Psychologist; writer agreed. Patient inquired if the writer's spouse was also Kinyarwanda. She then spoke of how grateful she was to the team and the writer for their assistance during this hospitalization. When asked what she would do differently going forward, patient spoke of wanting to remain on medication because she felt better when taking it. She admitted she did not do well when she stopped taking her medications, thinking she did not need them anymore. However, she recalled the last time when she stopped medications years ago and was able to recognize how poorly she had done back then as well. Writer encouraged her to continue working on her coping skills and remarked on her positive progress in her treatment. Patient again expressed her appreciation for the writer's assistance and encouraged him to buy the Mauritian chocolates at [employer] because they were that good.
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
## 2023-12-04 NOTE — BH INPATIENT PSYCHIATRY PROGRESS NOTE - NSBHFUPINTERVALHXFT_PSY_A_CORE
f/up schizophrenia, care discussed w/ tx team, EMILI. Patient was pleasant and cooperative on approach, reported that she was "sick, delusional" on admission, thought that her  was sick. Patient reported that she no longer believes these delusions. Patient denied restlessness, tremors or td/ eps. Patient reported sleeping well and with good appetite. denied dizziness. denied SI/I/P.

## 2023-12-04 NOTE — BH INPATIENT PSYCHIATRY PROGRESS NOTE - NSTXDCFAMDATETRGT_PSY_ALL_CORE
13-Nov-2023
05-Dec-2023
04-Dec-2023
21-Nov-2023
07-Nov-2023
07-Nov-2023
21-Nov-2023
22-Nov-2023
05-Dec-2023
29-Nov-2023
05-Dec-2023
04-Dec-2023
07-Nov-2023
07-Nov-2023
22-Nov-2023
29-Nov-2023
21-Nov-2023
21-Nov-2023
07-Nov-2023
21-Nov-2023
21-Nov-2023
04-Dec-2023
13-Nov-2023
22-Nov-2023
22-Nov-2023
04-Dec-2023
13-Nov-2023
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
## 2023-12-04 NOTE — BH PSYCHOLOGY - CLINICIAN PSYCHOTHERAPY NOTE - NSBHPSYCHOLGOALS_PSY_A_CORE
Decrease symptoms/Assessment/Improve level of independent functioning/Treatment compliance
Decrease symptoms/Assessment/Improve level of independent functioning/Improve social/vocational/coping skills/Treatment compliance
Decrease symptoms/Assessment/Improve level of independent functioning/Improve social/vocational/coping skills/Treatment compliance
Decrease symptoms/Improve level of independent functioning/Improve social/vocational/coping skills/Treatment compliance

## 2023-12-04 NOTE — BH PSYCHOLOGY - CLINICIAN PSYCHOTHERAPY NOTE - NSTXPSYCHOGOAL_PSY_ALL_CORE
Will engage in a 15 minute conversation with no irrational content
Will identify 2 coping skills that assist with focus on reality
Will identify 2 coping skills that assist with focus on reality
Make at least 5 reality based statements/requests to staff and/or peers
Will identify 2 coping skills that assist with focus on reality

## 2023-12-04 NOTE — BH INPATIENT PSYCHIATRY PROGRESS NOTE - NSTXDISORGINTERMD_PSY_ALL_CORE
Med management with Abilify
Med management with Demetria Lind
Med management with Demetria Lind
Med management with risperidone 
Med management with Abilify
Med management with Abilify
Med management with Invega
Med management with Demetria Lind
Med management with Abilify
Med management with Demetria Lind
Med management with Abilify
Med management with Demetria Lind
Med management with Abilify
Med management with risperidone 
Med management with Abilify
Med management with Invega
Med management with Abilify
Med management with risperidone 
Med management with Abilify
Med management with Demetria Lind
Med management with Abilify
Med management with Abilify
Med management with Invega
Med management with Abilify
Med management with Abilify
Med management with Invega
Med management with Abilify
Med management with Demetria Lind

## 2023-12-04 NOTE — BH INPATIENT PSYCHIATRY PROGRESS NOTE - NSTXDISORGDATEEST_PSY_ALL_CORE
08-Oct-2023
06-Nov-2023
07-Oct-2023
06-Nov-2023
08-Oct-2023
07-Oct-2023
08-Oct-2023
05-Nov-2023
07-Oct-2023
08-Oct-2023
06-Nov-2023
06-Nov-2023
08-Oct-2023
08-Oct-2023
07-Oct-2023
08-Oct-2023
06-Nov-2023
07-Oct-2023
08-Oct-2023
06-Nov-2023
08-Oct-2023
07-Oct-2023
08-Oct-2023
07-Oct-2023
08-Oct-2023
08-Oct-2023
01-Nov-2023
08-Oct-2023
06-Nov-2023
07-Oct-2023
06-Nov-2023
06-Nov-2023
01-Nov-2023
08-Oct-2023
08-Oct-2023

## 2023-12-04 NOTE — BH DISCHARGE NOTE NURSING/SOCIAL WORK/PSYCH REHAB - PATIENT PORTAL LINK FT
You can access the FollowMyHealth Patient Portal offered by [facility] by registering at the following website: [email or web address]. By joining [facility]’s FollowMyHealth portal, you will also be able to view your health information using other applications (apps) compatible with our system. You can access the FollowMyHealth Patient Portal offered by [facility] by registering at the following website: [email or web address]. By joining [facility]’s FollowMyHealth portal, you will also be able to view your health information using other applications (apps) compatible with our system.

## 2023-12-04 NOTE — BH PSYCHOLOGY - CLINICIAN PSYCHOTHERAPY NOTE - TOKEN PULL-DIAGNOSIS
Primary Diagnosis:  Psychosis [F29]      Bipolar disorder, curr episode mixed, severe, with psychotic features [F31.64]      Psychosis [F29]        Problem Dx:   
Primary Diagnosis:  Schizophrenia [F20.9]      Psychosis [F29]      Bipolar disorder, curr episode mixed, severe, with psychotic features [F31.64]      Psychosis [F29]        Problem Dx:

## 2023-12-04 NOTE — BH DISCHARGE NOTE NURSING/SOCIAL WORK/PSYCH REHAB - NSBHDCADDR1FT_A_CORE
[id number] [place] [id number] [phone] [place] [id number]

## 2023-12-04 NOTE — BH INPATIENT PSYCHIATRY PROGRESS NOTE - NSTXPSYCHOPROGRES_PSY_ALL_CORE
No Change
Improving
No Change
No Change
Improving
No Change
Improving
Improving
No Change
No Change
Improving
No Change
Improving
No Change
Improving
No Change
Improving
Improving
No Change
Improving

## 2023-12-04 NOTE — BH INPATIENT PSYCHIATRY PROGRESS NOTE - NSTXPSYCHODATEEST_PSY_ALL_CORE
14-Oct-2023
01-Nov-2023
07-Oct-2023
14-Oct-2023
19-Oct-2023
14-Oct-2023
19-Oct-2023
01-Nov-2023
01-Nov-2023
19-Oct-2023
01-Nov-2023
01-Nov-2023
07-Oct-2023
01-Nov-2023
07-Oct-2023
14-Oct-2023
01-Nov-2023
07-Oct-2023
14-Oct-2023
01-Nov-2023
14-Oct-2023
01-Nov-2023
26-Nov-2023
07-Oct-2023
01-Nov-2023
19-Oct-2023
01-Nov-2023
01-Nov-2023
14-Oct-2023
01-Nov-2023
26-Nov-2023
19-Oct-2023
19-Oct-2023
26-Nov-2023

## 2023-12-04 NOTE — BH INPATIENT PSYCHIATRY PROGRESS NOTE - MSE OPTIONS
Unstructured MSE

## 2023-12-04 NOTE — BH PSYCHOLOGY - CLINICIAN PSYCHOTHERAPY NOTE - NSBHPSYCHOLPARTICIP_PSY_A_CORE
Resistant to interventions
Partially engaged
Partially engaged
Resistant to interventions
Partially engaged
Fully engaged
Fully engaged

## 2023-12-04 NOTE — BH PSYCHOLOGY - CLINICIAN PSYCHOTHERAPY NOTE - NSBHPTASSESSDT_PSY_A_CORE
02-Nov-2023 15:04
11-Oct-2023 14:18
23-Oct-2023 15:52
24-Oct-2023 12:42
04-Dec-2023 11:23
25-Oct-2023 12:47
16-Oct-2023 16:14

## 2023-12-04 NOTE — BH INPATIENT PSYCHIATRY PROGRESS NOTE - NSBHATTESTTYPEVISIT_PSY_A_CORE
Attending Only
SAMAN without on-site Attending supervision

## 2023-12-04 NOTE — BH PSYCHOLOGY - CLINICIAN PSYCHOTHERAPY NOTE - NSTXDISORGGOAL_PSY_ALL_CORE
Will identify 2 coping skills that assist in organizing

## 2023-12-04 NOTE — BH PSYCHOLOGY - CLINICIAN PSYCHOTHERAPY NOTE - NSBHPSYCHOLPROBS_PSY_ALL_CORE
Academic/Vocational/Social Dysfunction
Academic/Vocational/Social Dysfunction/Aggression/Anger/Irritability/Impulsivity/Psychosis
Academic/Vocational/Social Dysfunction/Anger/Irritability/Disorganization/Psychosis
Academic/Vocational/Social Dysfunction/Anger/Irritability/Psychosis
Academic/Vocational/Social Dysfunction/Anger/Irritability/Impulsivity/Psychosis
Academic/Vocational/Social Dysfunction/Anger/Irritability/Psychosis
Academic/Vocational/Social Dysfunction/Anger/Irritability/Psychosis

## 2023-12-04 NOTE — BH PSYCHOLOGY - CLINICIAN PSYCHOTHERAPY NOTE - NSBHPSYCHOLRESPONSE_PSY_A_CORE
Symptoms reduced/Accepted support
Symptoms reduced/Accepted support
Coping skills acquired/Accepted support
Symptoms reduced/Accepted support
Symptoms reduced

## 2023-12-04 NOTE — BH DISCHARGE NOTE NURSING/SOCIAL WORK/PSYCH REHAB - [facility]
Unit Name: [id number]                          Unit Phone Number: [phone] Unit Name: [id number]                          Unit Phone Number: [phone]

## 2023-12-04 NOTE — BH INPATIENT PSYCHIATRY PROGRESS NOTE - NSTXDCFAMPROGRES_PSY_ALL_CORE
No Change
Improving
No Change
Improving
No Change
Improving
No Change
Improving
No Change
Improving
No Change
Improving
No Change
Improving
No Change
No Change
Improving
No Change
No Change
Improving
No Change
No Change
Improving

## 2023-12-04 NOTE — BH INPATIENT PSYCHIATRY PROGRESS NOTE - NSBHATTESTBILLING_PSY_A_CORE
84365-Wlcbgdgowj OBS or IP - moderate complexity OR 35-49 mins 57009-Yvcnepxiod OBS or IP - moderate complexity OR 35-49 mins
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
## 2023-12-04 NOTE — BH INPATIENT PSYCHIATRY PROGRESS NOTE - NSBHMETABOLIC_PSY_ALL_CORE_FT
BMI:   HbA1c: A1C with Estimated Average Glucose Result: 4.9 % (10-18-23 @ 11:45)    Glucose:   BP: --Vital Signs Last 24 Hrs  T(C): 36.4 (12-04-23 @ 08:29), Max: 36.4 (12-04-23 @ 08:29)  T(F): 97.5 (12-04-23 @ 08:29), Max: 97.5 (12-04-23 @ 08:29)  HR: --  BP: --  BP(mean): --  RR: --  SpO2: --    Orthostatic VS  12-04-23 @ 08:29  Lying BP: --/-- HR: --  Sitting BP: 104/76 HR: 74  Standing BP: 97/75 HR: 104  Site: --  Mode: --  Orthostatic VS  12-03-23 @ 08:21  Lying BP: --/-- HR: --  Sitting BP: 107/73 HR: 72  Standing BP: 101/66 HR: 92  Site: --  Mode: --    Lipid Panel: Date/Time: 10-18-23 @ 11:45  Cholesterol, Serum: 123  LDL Cholesterol Calculated: 43  HDL Cholesterol, Serum: 49  Total Cholesterol/HDL Ration Measurement: --  Triglycerides, Serum: 153

## 2023-12-04 NOTE — BH INPATIENT PSYCHIATRY PROGRESS NOTE - NSTXPSYCHOINTERMD_PSY_ALL_CORE
Med management with Abilify
Med management with Demetria Lind
Med management with Invega
Med management with risperidone 
Med management with Abilify
Med management with Demetria Lind
Med management with Abilify
Med management with risperidone 
Med management with Invega
Med management with Abilify
Med management with Abilify
Med management with Demetria Lind
Med management with Abilify
Med management with Invega
Med management with Abilify
Med management with Invega
Med management with risperidone 
Med management with Abilify
Med management with Demetria Lind
Med management with Demetria Lind
Med management with Abilify
Med management with Demetria Lind
Med management with Demetria Lind

## 2023-12-04 NOTE — BH DISCHARGE NOTE NURSING/SOCIAL WORK/PSYCH REHAB - NSDCADDINFO2FT_PSY_ALL_CORE
[facility]  [facility]  Office of Mental Health  [facility]   [address] [address]  Office: [phone]  Cell: [phone]   [facility]  [facility]  Office of Mental Health  [facility]   [address] [address]  Office: [phone]  Cell: [phone]

## 2023-12-04 NOTE — BH INPATIENT PSYCHIATRY PROGRESS NOTE - NSDCCRITERIA_PSY_ALL_CORE
symptom management, safety planning, care coordination 

## 2023-12-04 NOTE — BH INPATIENT PSYCHIATRY PROGRESS NOTE - MSE UNSTRUCTURED FT
Appearance: hygiene-intact, casually dressed  Behavior: less guarded and no longer overtly mistrustful, no PMA/PMR  Speech: normal rate and volume  Mood: "good"  Affect: calm, neutral, euthymic  Thought process: linear, goal directed  Thought content: less paranoid, denied SI/HI  Perceptions: no AH, VH elicited, not internally pre-occupied appearing  Insight: improving  Judgement: fair  Cognition: grossly intact   Gait: intact

## 2023-12-04 NOTE — BH INPATIENT PSYCHIATRY PROGRESS NOTE - NSTXDISORGPROGRES_PSY_ALL_CORE
Improving
No Change
No Change
Improving
No Change
No Change
Improving
Improving
No Change
Improving
No Change
Improving
No Change
Improving
No Change
Improving
No Change
Improving
No Change
No Change
Improving
No Change
Improving
No Change
Improving
No Change
Improving
No Change

## 2023-12-04 NOTE — BH INPATIENT PSYCHIATRY PROGRESS NOTE - NSBHFUPINTERVALCCFT_PSY_A_CORE
Follow-up for aggression, delusions. Patient seen, chart reviewed, discussed with team.
Follow-up for aggression, delusions. Patient seen, chart reviewed, discussed with team.
Follow-up for aggression, paranoia. Patient seen, chart reviewed, discussed with team.
Follow-up for aggression, delusions. Patient seen, chart reviewed, discussed with team.
Follow-up for aggression, paranoia. Patient seen, chart reviewed, discussed with team.
Follow-up for aggression, delusions. Patient seen, chart reviewed, discussed with team.
Follow-up for aggression, paranoia. Patient seen, chart reviewed, discussed with team.
Follow-up for aggression, delusions. Patient seen, chart reviewed, discussed with team.
Follow-up for aggression, paranoia. Patient seen, chart reviewed, discussed with team.
Follow-up for aggression, paranoia. Patient seen, chart reviewed, discussed with team.
Follow-up for aggression, delusions. Patient seen, chart reviewed, discussed with team.
reported feeling "good".

## 2023-12-04 NOTE — BH INPATIENT PSYCHIATRY PROGRESS NOTE - NSTXPROBPSYCHO_PSY_ALL_CORE
PSYCHOTIC SYMPTOMS

## 2023-12-04 NOTE — BH INPATIENT PSYCHIATRY PROGRESS NOTE - NSBHCHARTREVIEWVS_PSY_A_CORE FT
Vital Signs Last 24 Hrs  T(C): 36.4 (12-04-23 @ 08:29), Max: 36.4 (12-04-23 @ 08:29)  T(F): 97.5 (12-04-23 @ 08:29), Max: 97.5 (12-04-23 @ 08:29)  HR: --  BP: --  BP(mean): --  RR: --  SpO2: --    Orthostatic VS  12-04-23 @ 08:29  Lying BP: --/-- HR: --  Sitting BP: 104/76 HR: 74  Standing BP: 97/75 HR: 104  Site: --  Mode: --  Orthostatic VS  12-03-23 @ 08:21  Lying BP: --/-- HR: --  Sitting BP: 107/73 HR: 72  Standing BP: 101/66 HR: 92  Site: --  Mode: --

## 2023-12-04 NOTE — BH INPATIENT PSYCHIATRY PROGRESS NOTE - NSTXDCFAMDATEEST_PSY_ALL_CORE
07-Nov-2023
31-Oct-2023
20-Nov-2023
07-Nov-2023
20-Nov-2023
07-Nov-2023
07-Nov-2023
20-Nov-2023
20-Nov-2023
31-Oct-2023
20-Nov-2023
25-Oct-2023
25-Oct-2023
23-Oct-2023
20-Nov-2023
20-Nov-2023
31-Oct-2023
27-Nov-2023
15-Nov-2023
23-Oct-2023
07-Nov-2023
25-Oct-2023
25-Oct-2023
31-Oct-2023
20-Nov-2023
20-Nov-2023
07-Nov-2023
15-Nov-2023
31-Oct-2023
23-Oct-2023

## 2023-12-04 NOTE — BH DISCHARGE NOTE NURSING/SOCIAL WORK/PSYCH REHAB - DISCHARGE INSTRUCTIONS AFTERCARE APPOINTMENTS
In order to check the location, date, or time of your aftercare appointment, please refer to your Discharge Instructions Document given to you upon leaving the hospital.  If you have lost the instructions please call [phone] In order to check the location, date, or time of your aftercare appointment, please refer to your Discharge Instructions Document given to you upon leaving the hospital.  If you have lost the instructions please call [phone]

## 2023-12-04 NOTE — BH PSYCHOLOGY - CLINICIAN PSYCHOTHERAPY NOTE - NSBHPSYCHOLINT_PSY_A_CORE
Cognitive/behavioral therapy/Supported coping skills/Supportive therapy/Treatment compliance encouraged
Supported coping skills/Supportive therapy/Treatment compliance encouraged
Cognitive/behavioral therapy/Supported coping skills/Supportive therapy/Treatment compliance encouraged
Supportive therapy/Treatment compliance encouraged
Supported coping skills/Supportive therapy/Treatment compliance encouraged
Cognitive/behavioral therapy/Supported coping skills/Supportive therapy/Treatment compliance encouraged
Cognitive/behavioral therapy/Supported coping skills/Supportive therapy/Treatment compliance encouraged

## 2023-12-04 NOTE — BH INPATIENT PSYCHIATRY PROGRESS NOTE - NSTXPSYCHODATETRGT_PSY_ALL_CORE
08-Nov-2023
08-Nov-2023
14-Oct-2023
08-Nov-2023
15-Nov-2023
21-Oct-2023
26-Oct-2023
26-Oct-2023
24-Nov-2023
10-Dec-2023
15-Nov-2023
10-Dec-2023
14-Oct-2023
26-Oct-2023
19-Oct-2023
21-Oct-2023
10-Dec-2023
22-Nov-2023
15-Nov-2023
26-Oct-2023
08-Nov-2023
14-Oct-2023
03-Dec-2023
21-Oct-2023
24-Nov-2023
14-Oct-2023
26-Oct-2023
15-Nov-2023
24-Nov-2023
10-Dec-2023
14-Oct-2023
15-Nov-2023
08-Nov-2023
26-Oct-2023
03-Dec-2023
08-Nov-2023
24-Nov-2023
26-Oct-2023

## 2023-12-04 NOTE — BH INPATIENT PSYCHIATRY PROGRESS NOTE - NSBHFUPMEDSE_PSY_A_CORE
None known

## 2023-12-04 NOTE — BH INPATIENT PSYCHIATRY PROGRESS NOTE - NSTXDISORGDATETRGT_PSY_ALL_CORE
20-Nov-2023
15-Oct-2023
20-Nov-2023
08-Dec-2023
19-Oct-2023
21-Oct-2023
01-Dec-2023
15-Oct-2023
15-Oct-2023
20-Nov-2023
21-Oct-2023
13-Nov-2023
28-Oct-2023
20-Nov-2023
28-Oct-2023
20-Nov-2023
28-Oct-2023
08-Dec-2023
08-Nov-2023
01-Dec-2023
11-Nov-2023
28-Oct-2023
15-Oct-2023
20-Nov-2023
20-Nov-2023
08-Dec-2023
28-Oct-2023
21-Oct-2023
08-Nov-2023
20-Nov-2023
26-Oct-2023
13-Nov-2023
15-Oct-2023
08-Dec-2023
13-Nov-2023
20-Nov-2023
28-Oct-2023
28-Oct-2023
01-Dec-2023
28-Oct-2023
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
## 2023-12-05 VITALS — TEMPERATURE: 98 F

## 2023-12-05 RX ADMIN — METFORMIN HYDROCHLORIDE 500 MILLIGRAM(S): 850 TABLET ORAL at 08:13

## 2023-12-05 RX ADMIN — Medication 3 MILLIGRAM(S): at 00:43

## 2023-12-05 RX ADMIN — Medication 1 PATCH: at 08:12
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
## 2023-12-05 NOTE — BH INPATIENT PSYCHIATRY DISCHARGE NOTE - NSBHMETABOLIC_PSY_ALL_CORE_FT
BMI:   HbA1c: A1C with Estimated Average Glucose Result: 4.9 % (10-18-23 @ 11:45)    Glucose:   BP: --Vital Signs Last 24 Hrs  T(C): 36.7 (12-05-23 @ 08:15), Max: 36.7 (12-05-23 @ 08:15)  T(F): 98.1 (12-05-23 @ 08:15), Max: 98.1 (12-05-23 @ 08:15)  HR: --  BP: --  BP(mean): --  RR: --  SpO2: --    Orthostatic VS  12-05-23 @ 08:15  Lying BP: --/-- HR: --  Sitting BP: 112/79 HR: 83  Standing BP: 106/72 HR: 100  Site: --  Mode: --  Orthostatic VS  12-04-23 @ 08:29  Lying BP: --/-- HR: --  Sitting BP: 104/76 HR: 74  Standing BP: 97/75 HR: 104  Site: --  Mode: --    Lipid Panel: Date/Time: 10-18-23 @ 11:45  Cholesterol, Serum: 123  LDL Cholesterol Calculated: 43  HDL Cholesterol, Serum: 49  Total Cholesterol/HDL Ration Measurement: --  Triglycerides, Serum: 153

## 2023-12-05 NOTE — BH INPATIENT PSYCHIATRY DISCHARGE NOTE - NSBHFUPINTERVALHXFT_PSY_A_CORE
f/up schizophrenia, care discussed w/ tx team, EMILI. Patient is calm, cooperative, reported that she "just had delusions" on admission and feels much better now since starting the medications.  Patient reported that she no longer believes these delusions.  Patient identified her family, vacations and food as protective factors. Patient denied restlessness, tremors or td/ eps. Patient reported sleeping well and with good appetite. denied dizziness. denied SI/I/P.
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
## 2023-12-05 NOTE — BH INPATIENT PSYCHIATRY DISCHARGE NOTE - NSDCMRMEDTOKEN_GEN_ALL_CORE_FT
Demetria Sustenna 234 mg/1.5 mL intramuscular suspension, extended release: 234 milligram(s) intramuscularly once a month  melatonin 3 mg oral tablet: 1 tab(s) orally once a day (at bedtime) As needed Insomnia  metFORMIN 500 mg oral tablet: 1 tab(s) orally 2 times a day (with meals)  nicotine 2 mg oral transmucosal lozenge: 1 lozenge oral transmucosal every 3 hours as needed for  cravings

## 2023-12-05 NOTE — BH INPATIENT PSYCHIATRY DISCHARGE NOTE - MSE UNSTRUCTURED FT
Appearance: hygiene-intact, casually dressed  Behavior: less guarded and no longer overtly mistrustful, no PMA/PMR  Speech: normal rate and volume  Mood: "great"  Affect: calm, neutral, euthymic  Thought process: linear, goal directed  Thought content: no delusions elicited.  denied SI/HI  Perceptions: no AH, VH elicited, not internally pre-occupied appearing  Insight: improving  Judgement: fair  Cognition: grossly intact   Gait: intact
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
## 2023-12-20 ENCOUNTER — OUTPATIENT (OUTPATIENT)
Dept: OUTPATIENT SERVICES | Facility: HOSPITAL | Age: 38
LOS: 1 days | Discharge: STILL ADMITTED OR ADMINISTRATIVE | End: 2023-12-20
Payer: MEDICARE

## 2023-12-27 PROCEDURE — 99213 OFFICE O/P EST LOW 20 MIN: CPT | Mod: 95

## 2024-01-16 NOTE — SOCIAL WORK POST DISCHARGE FOLLOW UP NOTE - NSBHSWFOLLOWUP_PSY_ALL_CORE_FT
Attempted contact with pt with no return call. Pt was familiar with emergency contacts and community supports. ACT application submitted and had been pending at discharge. Pt stable at discharge and connected with services.

## 2024-01-18 DIAGNOSIS — F20.9 SCHIZOPHRENIA, UNSPECIFIED: ICD-10-CM

## 2024-02-07 PROBLEM — Z00.00 ENCOUNTER FOR PREVENTIVE HEALTH EXAMINATION: Status: ACTIVE | Noted: 2024-02-07

## 2024-02-13 NOTE — BH INPATIENT PSYCHIATRY PROGRESS NOTE - NSCGIIMPROVESX_PSY_ALL_CORE
Miralax 1 capful daily  Chocolate ex lax 1 square daily over the weekend - may use daily as needed    Eat 3 meals per day  May supplement diet with [employer] Instant Breakfast: 1-2 per day if skips a meal  Dad will reach out to school for school menu - for meal planning so Umm will eat lunch at school    Follow up 1 month - in office   3 = Minimally improved - slightly better with little or no clinically meaningful reduction of symptoms.  Represents very little change in basic clinical status, level of care, or functional capacity.

## 2024-12-23 NOTE — ED BEHAVIORAL HEALTH ASSESSMENT NOTE - NS ED BHA PLAN HANDOFF TO INPATIENT PROVIDER YESNO
DISPLAY PLAN FREE TEXT DISPLAY PLAN FREE TEXT DISPLAY PLAN FREE TEXT DISPLAY PLAN FREE TEXT DISPLAY PLAN FREE TEXT No

## 2025-01-26 NOTE — BH INPATIENT PSYCHIATRY PROGRESS NOTE - NSCGISEVERILLNESS_PSY_ALL_CORE
show 5 = Markedly ill - intrusive symptoms that distinctly impair social/occupational function or cause intrusive levels of distress

## 2025-04-30 ENCOUNTER — LABORATORY RESULT (OUTPATIENT)
Age: 40
End: 2025-04-30

## 2025-04-30 ENCOUNTER — OUTPATIENT (OUTPATIENT)
Dept: OUTPATIENT SERVICES | Facility: HOSPITAL | Age: 40
LOS: 1 days | End: 2025-04-30
Payer: MEDICARE

## 2025-04-30 ENCOUNTER — APPOINTMENT (OUTPATIENT)
Dept: OBGYN | Facility: CLINIC | Age: 40
End: 2025-04-30
Payer: MEDICARE

## 2025-04-30 ENCOUNTER — NON-APPOINTMENT (OUTPATIENT)
Age: 40
End: 2025-04-30

## 2025-04-30 VITALS — SYSTOLIC BLOOD PRESSURE: 124 MMHG | WEIGHT: 216 LBS | DIASTOLIC BLOOD PRESSURE: 80 MMHG

## 2025-04-30 DIAGNOSIS — Z01.419 ENCOUNTER FOR GYNECOLOGICAL EXAMINATION (GENERAL) (ROUTINE) W/OUT ABNORMAL FINDINGS: ICD-10-CM

## 2025-04-30 DIAGNOSIS — R92.30 DENSE BREASTS, UNSPECIFIED: ICD-10-CM

## 2025-04-30 DIAGNOSIS — N76.0 ACUTE VAGINITIS: ICD-10-CM

## 2025-04-30 DIAGNOSIS — Z12.72 ENCOUNTER FOR GYNECOLOGICAL EXAMINATION (GENERAL) (ROUTINE) W/OUT ABNORMAL FINDINGS: ICD-10-CM

## 2025-04-30 DIAGNOSIS — N91.1 SECONDARY AMENORRHEA: ICD-10-CM

## 2025-04-30 DIAGNOSIS — R79.89 OTHER SPECIFIED ABNORMAL FINDINGS OF BLOOD CHEMISTRY: ICD-10-CM

## 2025-04-30 PROCEDURE — 99213 OFFICE O/P EST LOW 20 MIN: CPT | Mod: 25

## 2025-04-30 PROCEDURE — G0463: CPT

## 2025-05-13 DIAGNOSIS — Z01.419 ENCOUNTER FOR GYNECOLOGICAL EXAMINATION (GENERAL) (ROUTINE) WITHOUT ABNORMAL FINDINGS: ICD-10-CM

## 2025-05-13 DIAGNOSIS — R79.89 OTHER SPECIFIED ABNORMAL FINDINGS OF BLOOD CHEMISTRY: ICD-10-CM

## 2025-05-13 DIAGNOSIS — R92.30 DENSE BREASTS, UNSPECIFIED: ICD-10-CM

## 2025-06-13 NOTE — ED BEHAVIORAL HEALTH PROGRESS NOTE - CASE SUMMARY/FORMULATION (CLEARLY DOCUMENT RATIONALE FOR DISPOSITION CHANGE)
16
Plan:    -2PC admission to inpatient psychiatry  -Haldol 5 mg PO/IM/IV q 6Hrs prn agitation; can be coadminister w/ Ativan 2 mg and Benadryl 25mg   -EKG monitoring if Qtc < 500 can continue use of psychotropic medications

## 2025-06-18 ENCOUNTER — APPOINTMENT (OUTPATIENT)
Facility: CLINIC | Age: 40
End: 2025-06-18
Payer: MEDICARE

## 2025-06-18 VITALS
HEART RATE: 97 BPM | WEIGHT: 218 LBS | BODY MASS INDEX: 35.88 KG/M2 | SYSTOLIC BLOOD PRESSURE: 122 MMHG | HEIGHT: 65.35 IN | DIASTOLIC BLOOD PRESSURE: 81 MMHG

## 2025-06-18 PROCEDURE — 99203 OFFICE O/P NEW LOW 30 MIN: CPT

## 2025-06-18 PROCEDURE — G2211 COMPLEX E/M VISIT ADD ON: CPT

## 2025-07-02 ENCOUNTER — TRANSCRIPTION ENCOUNTER (OUTPATIENT)
Age: 40
End: 2025-07-02

## 2025-09-15 ENCOUNTER — APPOINTMENT (OUTPATIENT)
Facility: CLINIC | Age: 40
End: 2025-09-15